# Patient Record
Sex: MALE | Race: WHITE | NOT HISPANIC OR LATINO | Employment: UNEMPLOYED | URBAN - METROPOLITAN AREA
[De-identification: names, ages, dates, MRNs, and addresses within clinical notes are randomized per-mention and may not be internally consistent; named-entity substitution may affect disease eponyms.]

---
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
## 2023-03-14 ENCOUNTER — OFFICE VISIT (OUTPATIENT)
Dept: FAMILY MEDICINE CLINIC | Facility: CLINIC | Age: 18
End: 2023-03-14

## 2023-03-14 VITALS
OXYGEN SATURATION: 97 % | WEIGHT: 193.25 LBS | TEMPERATURE: 98.1 F | BODY MASS INDEX: 30.27 KG/M2 | SYSTOLIC BLOOD PRESSURE: 122 MMHG | HEART RATE: 117 BPM | DIASTOLIC BLOOD PRESSURE: 78 MMHG

## 2023-03-14 DIAGNOSIS — G89.29 CHRONIC PAIN OF LEFT ANKLE: Primary | ICD-10-CM

## 2023-03-14 DIAGNOSIS — I10 PRIMARY HYPERTENSION: ICD-10-CM

## 2023-03-14 DIAGNOSIS — M25.572 CHRONIC PAIN OF LEFT ANKLE: Primary | ICD-10-CM

## 2023-03-14 DIAGNOSIS — R73.03 PRE-DIABETES: ICD-10-CM

## 2023-03-14 DIAGNOSIS — M79.672 CHRONIC FOOT PAIN, LEFT: ICD-10-CM

## 2023-03-14 DIAGNOSIS — G89.29 CHRONIC FOOT PAIN, LEFT: ICD-10-CM

## 2023-03-14 NOTE — ASSESSMENT & PLAN NOTE
Podiatry referral was placed for follow-up regarding this  Patient was advised to continue Tylenol and icing the area as needed for pain  Care Gap request will be sent to hopefully locate his prior MRI results

## 2023-03-14 NOTE — PROGRESS NOTES
Name: Alonso Costa      : 2005      MRN: [id number]  Encounter Provider: ARA Paulino  Encounter Date: 3/14/2023   Encounter department: [place] [address]  Chronic pain of left ankle  Assessment & Plan:  Podiatry referral was placed for follow-up regarding this  Patient was advised to continue Tylenol and icing the area as needed for pain  Care Gap request will be sent to hopefully locate his prior MRI results  Orders:  -     Ambulatory Referral to Podiatry; Future    2  Chronic foot pain, left  Assessment & Plan:  Podiatry referral was placed for follow-up regarding this  Patient was advised to continue Tylenol and icing the area as needed for pain  Care Gap request will be sent to hopefully locate his prior MRI results  Orders:  -     Ambulatory Referral to Podiatry; Future    3  Primary hypertension  Assessment & Plan: This remains diet controlled  4  Pre-diabetes  Assessment & Plan: This has resolved at this point  Patient was advised to continue to limit sugar and carbohydrates in his diet  BMI Counseling: Body mass index is 30 27 kg/m²  The BMI is above normal  Nutrition recommendations include decreasing portion sizes, encouraging healthy choices of fruits and vegetables, moderation in carbohydrate intake and increasing intake of lean protein  Exercise recommendations include exercising 3-5 times per week and obtaining a gym membership  No pharmacotherapy was ordered  Rationale for BMI follow-up plan is due to patient being overweight or obese  Depression Screening and Follow-up Plan: Patient was screened for depression during today's encounter  They screened negative with a PHQ-2 score of 0  Subjective      Chronic pain of left ankle/foot: The patient reports about 7 years ago he fell and twisted his left ankle  He reports the pain has always been present since that time but was manageable    Patient was reporting increased pain in his left ankle in September 2022 so an x-ray of the area was completed and was noted to be normal   The patient reports that his pain is localized to the dorsal portion of his left upper foot below his left ankle  The patient denies any decreased range of motion of his left ankle or foot  He also denies any instability or paresthesias in these areas  The patient reports that he did have an MRI of his left foot completed in 2017 which she reports did show abnormalities however, he does not specifically remember what the abnormalities were  I am also currently unable to view this MRI in [facility]  I will have a care gap request created to see if we can get these results  Patient is interested in speaking with a podiatrist about his pain  Hypertension: Patient's blood pressure is at goal in the office today  Patient is not currently taking blood pressure medication  Prediabetes: Patient's most recent hemoglobin A1c was normal   Patient denies polydipsia, polyphagia, or polyuria  Review of Systems   Constitutional: Negative for chills and fever  HENT: Negative for ear pain and sore throat  Eyes: Negative for pain and visual disturbance  Respiratory: Negative for cough, chest tightness, shortness of breath and wheezing  Cardiovascular: Negative for chest pain, palpitations and leg swelling  Gastrointestinal: Negative for abdominal pain, constipation, diarrhea, nausea and vomiting  Endocrine: Negative for cold intolerance and heat intolerance  Genitourinary: Negative for decreased urine volume, dysuria and hematuria  Musculoskeletal: Positive for arthralgias (left ankle and foot-chronic )  Negative for back pain and myalgias  Skin: Negative for color change and rash  Allergic/Immunologic: Negative for environmental allergies  Neurological: Negative for dizziness, seizures, syncope, weakness, light-headedness, numbness and headaches     Hematological: Negative for adenopathy  Psychiatric/Behavioral: Negative for confusion  The patient is not nervous/anxious  All other systems reviewed and are negative  Current Outpatient Medications on File Prior to Visit   Medication Sig   • [DISCONTINUED] APPLE CIDER VINEGAR PO Take by mouth Patient opens the ACV capsules and puts in his Vitamin H20 (Patient not taking: Reported on 3/14/2023)   • [DISCONTINUED] cholecalciferol (VITAMIN D3) 400 units tablet Take 400 Units by mouth daily Patient crushes the vit  D and puts it in vitamin H20 (Patient not taking: Reported on 3/14/2023)   • [DISCONTINUED] Cyanocobalamin (CVS B12 GUMMIES PO) Take by mouth (Patient not taking: Reported on 10/12/2022)   • [DISCONTINUED] FIBER PO Take by mouth (Patient not taking: Reported on 4/23/2022)   • [DISCONTINUED] ondansetron (Zofran ODT) 4 mg disintegrating tablet Take 1 tablet (4 mg total) by mouth every 6 (six) hours as needed for nausea or vomiting (Patient not taking: Reported on 12/13/2022)       Objective     /78 (BP Location: Right arm, Patient Position: Sitting, Cuff Size: Large)   Pulse (!) 117   Temp 98 1 °F (36 7 °C) (Temporal)   Wt 87 7 kg (193 lb 4 oz)   SpO2 97%   BMI 30 27 kg/m²     Physical Exam  Vitals and nursing note reviewed  Constitutional:       General: He is not in acute distress  Appearance: Normal appearance  He is not ill-appearing  HENT:      Head: Normocephalic  Eyes:      Conjunctiva/sclera: Conjunctivae normal    Cardiovascular:      Rate and Rhythm: Normal rate and regular rhythm  Pulses: Normal pulses  Carotid pulses are 2+ on the right side and 2+ on the left side  Radial pulses are 2+ on the right side and 2+ on the left side  Dorsalis pedis pulses are 2+ on the right side and 2+ on the left side  Posterior tibial pulses are 2+ on the right side and 2+ on the left side  Heart sounds: Normal heart sounds  No murmur heard    Pulmonary:      Effort: Pulmonary effort is normal  No respiratory distress  Breath sounds: Normal breath sounds  No decreased breath sounds, wheezing, rhonchi or rales  Abdominal:      General: Abdomen is flat  Bowel sounds are normal  There is no distension  Palpations: Abdomen is soft  Tenderness: There is no abdominal tenderness  There is no guarding  Musculoskeletal:         General: Normal range of motion  Cervical back: Normal range of motion  Right lower leg: No edema  Left lower leg: No edema  Left ankle: No swelling  No tenderness  Normal range of motion  Left Achilles Tendon: No tenderness or defects  Left foot: Normal range of motion and normal capillary refill  Tenderness and bony tenderness present  No swelling, bunion, Charcot foot, foot drop or crepitus  Feet:      Comments: Full range of motion noted in the left ankle and foot and patient denied any pain with movement of these areas  No pain was noted when performing dorsiflexion or plantarflexion of the left foot  Patient did report some tenderness with palpation of the upper dorsal portion of the left foot below the left ankle  No skin changes were noted in the affected area  Skin:     General: Skin is warm and dry  Capillary Refill: Capillary refill takes less than 2 seconds  Neurological:      General: No focal deficit present  Mental Status: He is alert and oriented to person, place, and time  Psychiatric:         Mood and Affect: Mood normal          Behavior: Behavior normal          Thought Content:  Thought content normal          Judgment: Judgment normal        ARA Rodas

## 2023-03-14 NOTE — ASSESSMENT & PLAN NOTE
This has resolved at this point  Patient was advised to continue to limit sugar and carbohydrates in his diet

## 2023-04-12 PROBLEM — Q66.89 TARSAL COALITION OF LEFT FOOT: Status: ACTIVE | Noted: 2023-04-12

## 2023-05-16 ENCOUNTER — TELEPHONE (OUTPATIENT)
Dept: PODIATRY | Facility: CLINIC | Age: 18
End: 2023-05-16

## 2023-05-16 NOTE — TELEPHONE ENCOUNTER
Caller: Lv Lino    Doctor: Opal Posada    Reason for call:  Had CT last week needs appt to go over results and [address] - nothing until end of June/jul - pt said he needs sooner - please call    Call back#: [phone]

## 2023-05-18 ENCOUNTER — TELEPHONE (OUTPATIENT)
Dept: OBGYN CLINIC | Facility: CLINIC | Age: 18
End: 2023-05-18

## 2023-05-18 NOTE — TELEPHONE ENCOUNTER
Pt cancelled appt via my chart for Monday 5/22 with Dr Marcos Perera  LM  to see if pt wanted to reschedule his appt with Dr Marcos Perera

## 2023-06-06 ENCOUNTER — OFFICE VISIT (OUTPATIENT)
Dept: PODIATRY | Facility: CLINIC | Age: 18
End: 2023-06-06
Payer: COMMERCIAL

## 2023-06-06 VITALS
HEIGHT: 66 IN | WEIGHT: 187.6 LBS | BODY MASS INDEX: 30.15 KG/M2 | DIASTOLIC BLOOD PRESSURE: 78 MMHG | SYSTOLIC BLOOD PRESSURE: 130 MMHG | HEART RATE: 102 BPM

## 2023-06-06 DIAGNOSIS — Q66.89 TARSAL COALITION OF LEFT FOOT: ICD-10-CM

## 2023-06-06 DIAGNOSIS — Z01.818 PREOP TESTING: Primary | ICD-10-CM

## 2023-06-06 PROCEDURE — 99213 OFFICE O/P EST LOW 20 MIN: CPT | Performed by: PODIATRIST

## 2023-06-06 NOTE — PROGRESS NOTES
"This patient was seen on 6/6/23  My role is Foot , Ankle, and Wound Specialist    SUBJECTIVE    Chief Complaint:  Painful Left foot     Patient ID: Warren Jones is a 21 y o  male  Brittany Marquez is here with his mom with the Left hindfoot pain  He did complete his CT scan  He did consistently wear his orthotics and got motion-control sneakers  While he notes some mild improvement, he still has long bouts of pain in the foot after standing for a prolonged period of time despite using these (such as raking leaves for his mother this past week)  He states the foot hurt for two days after that  He wants to discuss surgery as he feels his foot won't allow him to find a job  The following portions of the patient's history were reviewed and updated as appropriate: allergies, current medications, past family history, past medical history, past social history, past surgical history and problem list     Review of Systems   Constitutional: Negative  Respiratory: Negative  Musculoskeletal: Positive for arthralgias and gait problem  OBJECTIVE      /78   Pulse 102   Ht 5' 6\" (1 676 m)   Wt 85 1 kg (187 lb 9 6 oz)   BMI 30 28 kg/m²     Foot/Ankle Musculoskeletal Exam    General    Neurological: alert  General additional comments: I note the bilateral hindfoot has diminished ROM  Ankle joint ROM is maintained  I note pain on forced inversion and eversion of the Left hindfoot  I note pain on palpation of the Left sinus tarsi  Physical Exam  Vitals and nursing note reviewed  Constitutional:       General: He is not in acute distress  Appearance: Normal appearance  He is not ill-appearing, toxic-appearing or diaphoretic  HENT:      Head: Normocephalic and atraumatic  Pulmonary:      Effort: Pulmonary effort is normal    Musculoskeletal:         General: Tenderness and deformity present  Comments: I note the bilateral hindfoot has diminished ROM  Ankle joint ROM is maintained   I " note pain on forced inversion and eversion of the Left hindfoot  I note pain on palpation of the Left sinus tarsi  Skin:     General: Skin is warm  Capillary Refill: Capillary refill takes less than 2 seconds  Neurological:      General: No focal deficit present  Mental Status: He is alert and oriented to person, place, and time  Psychiatric:         Mood and Affect: Mood normal              ASSESSMENT     Diagnoses and all orders for this visit:    Tarsal coalition of left foot         Problem List Items Addressed This Visit        Musculoskeletal and Integument    Tarsal coalition of left foot - Primary           PLAN    I personally reviewed the CT images dated 4/20/23 noting a fibrous non-union Left middle facet STJ  I reviewed the study with he and his mother  I reviewed options including continued use of the orthotics vs  Surgical fusion  Pros and cons of both options reviewed and discussed  I recommend they take a couple of weeks to consider his options and return here to finalize treatment options

## 2023-06-07 RX ORDER — CHLORHEXIDINE GLUCONATE 0.12 MG/ML
15 RINSE ORAL ONCE
OUTPATIENT
Start: 2023-06-07 | End: 2023-06-07

## 2023-07-01 ENCOUNTER — APPOINTMENT (OUTPATIENT)
Dept: LAB | Facility: MEDICAL CENTER | Age: 18
End: 2023-07-01
Payer: COMMERCIAL

## 2023-07-01 DIAGNOSIS — Q66.89 TARSAL COALITION OF LEFT FOOT: ICD-10-CM

## 2023-07-01 DIAGNOSIS — Z01.818 PREOP TESTING: ICD-10-CM

## 2023-07-01 LAB
ANION GAP SERPL CALCULATED.3IONS-SCNC: 2 MMOL/L
BASOPHILS # BLD AUTO: 0.04 THOUSANDS/ÂΜL (ref 0–0.1)
BASOPHILS NFR BLD AUTO: 1 % (ref 0–1)
BUN SERPL-MCNC: 16 MG/DL (ref 5–25)
CALCIUM SERPL-MCNC: 9.7 MG/DL (ref 8.3–10.1)
CHLORIDE SERPL-SCNC: 105 MMOL/L (ref 96–108)
CO2 SERPL-SCNC: 27 MMOL/L (ref 21–32)
CREAT SERPL-MCNC: 0.89 MG/DL (ref 0.6–1.3)
EOSINOPHIL # BLD AUTO: 0.13 THOUSAND/ÂΜL (ref 0–0.61)
EOSINOPHIL NFR BLD AUTO: 2 % (ref 0–6)
ERYTHROCYTE [DISTWIDTH] IN BLOOD BY AUTOMATED COUNT: 13.7 % (ref 11.6–15.1)
GFR SERPL CREATININE-BSD FRML MDRD: 123 ML/MIN/1.73SQ M
GLUCOSE P FAST SERPL-MCNC: 90 MG/DL (ref 65–99)
HCT VFR BLD AUTO: 49.2 % (ref 36.5–49.3)
HGB BLD-MCNC: 16.2 G/DL (ref 12–17)
IMM GRANULOCYTES # BLD AUTO: 0.01 THOUSAND/UL (ref 0–0.2)
IMM GRANULOCYTES NFR BLD AUTO: 0 % (ref 0–2)
LYMPHOCYTES # BLD AUTO: 2.69 THOUSANDS/ÂΜL (ref 0.6–4.47)
LYMPHOCYTES NFR BLD AUTO: 44 % (ref 14–44)
MCH RBC QN AUTO: 28 PG (ref 26.8–34.3)
MCHC RBC AUTO-ENTMCNC: 32.9 G/DL (ref 31.4–37.4)
MCV RBC AUTO: 85 FL (ref 82–98)
MONOCYTES # BLD AUTO: 0.48 THOUSAND/ÂΜL (ref 0.17–1.22)
MONOCYTES NFR BLD AUTO: 8 % (ref 4–12)
NEUTROPHILS # BLD AUTO: 2.81 THOUSANDS/ÂΜL (ref 1.85–7.62)
NEUTS SEG NFR BLD AUTO: 45 % (ref 43–75)
NRBC BLD AUTO-RTO: 0 /100 WBCS
PLATELET # BLD AUTO: 303 THOUSANDS/UL (ref 149–390)
PMV BLD AUTO: 10.3 FL (ref 8.9–12.7)
POTASSIUM SERPL-SCNC: 4.2 MMOL/L (ref 3.5–5.3)
RBC # BLD AUTO: 5.79 MILLION/UL (ref 3.88–5.62)
SODIUM SERPL-SCNC: 134 MMOL/L (ref 135–147)
WBC # BLD AUTO: 6.16 THOUSAND/UL (ref 4.31–10.16)

## 2023-07-01 PROCEDURE — 36415 COLL VENOUS BLD VENIPUNCTURE: CPT

## 2023-07-01 PROCEDURE — 80048 BASIC METABOLIC PNL TOTAL CA: CPT

## 2023-07-01 PROCEDURE — 85025 COMPLETE CBC W/AUTO DIFF WBC: CPT

## 2023-07-06 ENCOUNTER — CONSULT (OUTPATIENT)
Dept: FAMILY MEDICINE CLINIC | Facility: CLINIC | Age: 18
End: 2023-07-06
Payer: COMMERCIAL

## 2023-07-06 VITALS
WEIGHT: 186 LBS | HEIGHT: 66 IN | OXYGEN SATURATION: 97 % | HEART RATE: 105 BPM | BODY MASS INDEX: 29.89 KG/M2 | TEMPERATURE: 98.5 F | DIASTOLIC BLOOD PRESSURE: 74 MMHG | SYSTOLIC BLOOD PRESSURE: 122 MMHG

## 2023-07-06 DIAGNOSIS — I10 PRIMARY HYPERTENSION: ICD-10-CM

## 2023-07-06 DIAGNOSIS — Q66.89 TARSAL COALITION OF LEFT FOOT: ICD-10-CM

## 2023-07-06 DIAGNOSIS — E55.9 VITAMIN D DEFICIENCY: ICD-10-CM

## 2023-07-06 DIAGNOSIS — Z01.818 PRE-OPERATIVE CLEARANCE: Primary | ICD-10-CM

## 2023-07-06 PROCEDURE — 99214 OFFICE O/P EST MOD 30 MIN: CPT | Performed by: NURSE PRACTITIONER

## 2023-07-06 PROCEDURE — 93000 ELECTROCARDIOGRAM COMPLETE: CPT | Performed by: NURSE PRACTITIONER

## 2023-07-06 RX ORDER — VITAMIN B COMPLEX
1 CAPSULE ORAL DAILY
COMMUNITY

## 2023-07-06 NOTE — PROGRESS NOTES
Presurgical Evaluation    Subjective:      Patient ID: Corine Benitez is a 21 y.o. male. Chief Complaint   Patient presents with   • Pre-op Exam     Pt states he is having left foot surgery by  at [facility] but is unsure of the procedure.  mgb       HPI    {Common ambulatory SmartLinks:21659}    Procedure date: {DATE:23014}    Surgeon:  ***  Planned procedure:  ***  Diagnosis for procedure:  ***    Prior anesthesia: {[facility] ANESTHSIA :9817385223}    [facility]   NOTE: Patient should see Cardiology if time available before surgery, and if appropriate. Pulmonary History: {[facility] PULM HX:7323030431}    Renal history: {[facility] Renal Hx:1951088261}    Diabetes History:  {[facility] DIABETES TYPE:6667226266}     Neurological History: {[facility] neuro:7541977193}     On Immunosuppressant meds/biologics: {[facility] IMMUNOSUPRESSANTS:3059528322}      Review of Systems      Current Outpatient Medications   Medication Sig Dispense Refill   • b complex vitamins capsule Take 1 capsule by mouth daily     • Multiple Vitamins-Minerals (MULTI-VITAMIN GUMMIES PO) Take by mouth     • VITAMIN D, CHOLECALCIFEROL, PO Take by mouth       No current facility-administered medications for this visit. Allergies on file:   Patient has no known allergies. Patient Active Problem List   Diagnosis   • Vitamin D deficiency   • Class 2 obesity due to excess calories without serious comorbidity with body mass index (BMI) of 39.0 to 39.9 in adult   • Hypertension   • Autism spectrum disorder-high functioning   • Central auditory processing disorder (CAPD)   • Bull's eye maculopathy   • Chronic pain of left ankle   • VIV (generalized anxiety disorder)   • Chronic foot pain, left   • Tarsal coalition of left foot        Past Medical History:   Diagnosis Date   • Autism 2004    Dr. Jahaira Jeong   • Pre-diabetes 3/9/2017    Overview:  HbA1c just above the normal range today. Will refer to peds endocrine.  Will need further nutritional counseling.    • Verbal apraxia 2004    Dr. Camden Mukherjee        Past Surgical History:   Procedure Laterality Date   • NO PAST SURGERIES         Family History   Problem Relation Age of Onset   • Learning disabilities Mother         Reading as a child   • Obesity Mother    • Diabetes Mother    • Bipolar disorder Father    • Depression Father    • Anxiety disorder Maternal Grandmother    • Obesity Maternal Grandmother    • Depression Maternal Grandfather    • Obesity Maternal Grandfather    • Obesity Paternal Grandmother    • Cancer Paternal Grandmother    • Cancer Paternal Grandfather    • Anxiety disorder Maternal Aunt    • Bipolar disorder Maternal Aunt    • Depression Maternal Aunt    • Developmental delay Maternal Aunt         Did not talk until 1years of age   • Obesity Paternal Aunt    • Depression Cousin    • Obesity Maternal Aunt    • Schizoaffective Disorder  Other    • Breast cancer additional onset Other        Social History     Tobacco Use   • Smoking status: Never   • Smokeless tobacco: Never   Vaping Use   • Vaping Use: Never used   Substance Use Topics   • Alcohol use: Never   • Drug use: Never       Objective:    Vitals:    07/06/23 1455   BP: 122/74   BP Location: Right arm   Patient Position: Sitting   Cuff Size: Large   Pulse: 105   Temp: 98.5 °F (36.9 °C)   TempSrc: Temporal   SpO2: 97%   Weight: 84.4 kg (186 lb)   Height: 5' 6" (1.676 m)        Physical Exam      Preop labs/testing available and reviewed: {YES/NO:20200}    eGFR   Date Value Ref Range Status   07/01/2023 123 ml/min/1.73sq m Final     WBC   Date Value Ref Range Status   07/01/2023 6.16 4.31 - 10.16 Thousand/uL Final     Hemoglobin   Date Value Ref Range Status   07/01/2023 16.2 12.0 - 17.0 g/dL Final     Hematocrit   Date Value Ref Range Status   07/01/2023 49.2 36.5 - 49.3 % Final     Platelets   Date Value Ref Range Status   07/01/2023 303 149 - 390 Thousands/uL Final          EKG {YES/NO:20200}    Echo {YES/NO:}    Stress test/cath {YES/NO:}    PFT/Ramiro {YES/NO:}    Functional capacity: {sl amb metabolic CDRZESRFZUO:8987017830}   Pick the highest level patient can comfortably perform   4 mets or greater for surgery    RCRI  High Risk surgery? 1 Point  CAD History:         1 Point   MI; Positive Stress Test; CP due to Mi;  Nitrate Usage to control Angina; Pathologic Q wave on EKG  CHF Active:         1 Point   Pulm Edema; Paroxysmal Nocturnal Dyspnea;  Bibasilar Rales (crackles);S3; CHF on CXR  Cerebrovascular Disease (TIA or CVA):     1 Point  DM on Insulin:        1 Point  Serum Creat >2.0 mg/dl:       1 Point          Total Points: {Numbers; 6-5:19518}     Scorin: Class I, Very Low Risk (0.4%)     1: Class II, Low risk (0.9%)     2: Class III Moderate (6.6%)     3: Class IV High (>11%)      DAREN Risk:  GFR:   eGFR   Date Value Ref Range Status   2023 123 ml/min/1.73sq m Final         For PCP:  If GFR>60, Hold ACE/ARB/Diuretic on the day of surgery, and NSAIDS 10 days before. If GFR<45, Consider PRE and POST op Nephrology Consult. If 46 <GFR> 59 : Has Patient had DAREN in last 6 Months? {YES/NO:}   If YES: Preop Nephrology consult   If No:  [id number] Sergio Schumacher Nephrology consult. Assessment/Plan:    Patient {is/is not:34533} medically optimized (cleared) for the planned procedure. Further testing/evaluation {is/is not:91464} required.     Postop concerns: {YES/NO:}    Problem List Items Addressed This Visit    None  Visit Diagnoses     Pre-operative clearance    -  Primary    Relevant Orders    POCT ECG           {Assess/PlanSmartLinks:05672}      Pre-Surgery Instructions:   Medication Instructions   • b complex vitamins capsule {OR PAT MED INSTRUCTIONS:75245}   • Multiple Vitamins-Minerals (MULTI-VITAMIN GUMMIES PO) {OR PAT MED INSTRUCTIONS:85234}   • VITAMIN D, CHOLECALCIFEROL, PO {OR PAT MED INSTRUCTIONS:59563}        NOTE: Please use the above to review important meds for your specialty, the remainder "per anesthesia Guidelines."    NOTE: Please place an Inbasket message for "[facility] acute hospital[facility]" pool for complicated patients.

## 2023-07-06 NOTE — PATIENT INSTRUCTIONS
Heart Healthy Diet   WHAT YOU NEED TO KNOW:   A heart healthy diet is an eating plan low in unhealthy fats and sodium (salt). The plan is high in healthy fats and fiber. A heart healthy diet helps improve your cholesterol levels and lowers your risk for heart disease and stroke. A dietitian will teach you how to read and understand food labels. DISCHARGE INSTRUCTIONS:   Heart healthy diet guidelines to follow:   Choose foods that contain healthy fats:       Unsaturated fats  include monounsaturated and polyunsaturated fats. Unsaturated fat is found in foods such as soybean, canola, olive, corn, and safflower oils. It is also found in soft tub margarine that is made with liquid vegetable oil. Omega-3 fat  is found in certain fish, such as salmon, tuna, and trout, and in walnuts and flaxseed. Eat fish high in omega-3 fats at least 2 times a week. Limit or do not have unhealthy fats:       Cholesterol  is found in animal foods, such as eggs and lobster, and in dairy products made from whole milk. Limit cholesterol to less than 200 mg each day. Saturated fat  is found in meats, such as mdaera and hamburger. It is also found in chicken or turkey skin, whole milk, and butter. Limit saturated fat to less than 7% of your total daily calories. Trans fat  is found in packaged foods, such as potato chips and cookies. It is also in hard margarine, some fried foods, and shortening. Do not eat foods that contain trans fats. Get 20 to 30 grams of fiber each day. Fruits, vegetables, whole-grain foods, and legumes (cooked beans) are good sources of fiber. Limit sodium as directed. You may be told to limit sodium, such as to 2,000 mg or less each day. Choose low-sodium or no-salt-added foods. Add little or no salt to food you prepare. Use herbs and spices in place of salt.         Include the following in your heart healthy plan:  Ask your dietitian or healthcare provider how many servings to have each day from the following food groups:  Grains:       Whole-wheat breads, cereals, and pastas, and brown rice     Low-fat, low-sodium crackers and chips     Vegetables:       Broccoli, green beans, green peas, and spinach     Collards, kale, and lima beans     Carrots, sweet potatoes, tomatoes, and peppers     Canned vegetables with no salt added     Fruits:       Bananas, peaches, pears, and pineapple     Grapes, raisins, and dates     Oranges, tangerines, grapefruit, orange juice, and grapefruit juice     Apricots, mangoes, melons, and papaya     Raspberries and strawberries     Canned fruit with no added sugar     Low-fat dairy:       Nonfat (skim) milk, 1% milk, and low-fat almond, cashew, or soy milks fortified with calcium     Low-fat cheese, regular or frozen yogurt, and cottage cheese     Meats and proteins:       Lean cuts of beef and pork (loin, leg, round), skinless chicken and turkey     Legumes, soy products, egg whites, or nuts     Limit or do not include the following in your heart healthy plan:   Foods and liquids that contain unhealthy fats and oils:       Whole or 2% milk, cream cheese, sour cream, or cheese     High-fat cuts of beef (T-bone steaks, ribs), chicken or turkey with skin, and organ meats such as liver     Butter, stick margarine, shortening, and cooking oils such as coconut or palm oil     Foods and liquids high in sodium:       Packaged foods, such as frozen dinners, cookies, macaroni and cheese, and cereals with more than 300 mg of sodium per serving     Vegetables with added sodium, such as instant potatoes, vegetables with added sauces, or regular canned vegetables     Cured or smoked meats, such as hot dogs, madera, and sausage     High-sodium ketchup, barbecue sauce, salad dressing, pickles, olives, soy sauce, or miso     Foods and liquids high in sugar:       Candy, cake, cookies, pies, or doughnuts     Soft drinks (soda), sports drinks, or sweetened tea     Canned or dry mixes for cakes, soups, sauces, or gravies     Other healthy heart guidelines:   Do not smoke. Nicotine and other chemicals in cigarettes and cigars can cause lung and heart damage. Ask your healthcare provider for information if you currently smoke and need help to quit. E-cigarettes or smokeless tobacco still contain nicotine. Talk to your provider before you use these products. Limit or do not drink alcohol as directed. Alcohol can damage your heart and raise your blood pressure. Your provider may give you specific daily and weekly limits. The general recommended limit is 1 drink a day for women 21 or older and for men 72 or older. Do not have more than 3 drinks within 24 hours or 7 within a week. The recommended limit is 2 drinks a day for men 24to 59years of age. Do not have more than 4 drinks within 24 hours or 14 within a week. A drink of alcohol is 12 ounces of beer, 5 ounces of wine, or 1½ ounces of liquor. Maintain a healthy weight. Extra body weight makes your heart work harder. Ask your provider what a healthy weight is for you. He or she can help you create a safe weight loss plan, if needed. Exercise regularly. Exercise can help you maintain a healthy weight and improve your blood pressure and cholesterol levels. Regular exercise can also decrease your risk for heart problems. Ask your provider about the best exercise plan for you. Do not start an exercise program without asking your provider. Follow up with your doctor or cardiologist as directed:  Write down your questions so you remember to ask them during your visits. © Copyright Tj Castellon 2022 Information is for End User's use only and may not be sold, redistributed or otherwise used for commercial purposes. The above information is an  only. It is not intended as medical advice for individual conditions or treatments.  Talk to your doctor, nurse or pharmacist before following any medical regimen to see if it is safe and effective for you.

## 2023-07-06 NOTE — PROGRESS NOTES
Presurgical Evaluation    Subjective:      Patient ID: Fred Genao is a 21 y.o. male. Chief Complaint   Patient presents with   • Pre-op Exam     Pt states he is having left foot surgery by  at [facility] but is unsure of the procedure.  mgb       Patient is here for preop exam for arthrodesis/fusion of the subtalar joint of the left foot with Dr. Leopoldo Bow of [facility] podiatry scheduled for 7/27/2023. Preop EKG performed in the office today showed normal sinus rhythm. Preop CBC and BMP were noted to be normal.    Hypertension: Patient's blood pressure was at goal in the office today. Patient is not currently taking blood pressure medication. Vitamin D deficiency: Patient's most recent vitamin D level was noted to be normal.  Patient does currently take a daily vitamin D supplement. The following portions of the patient's history were reviewed and updated as appropriate: allergies, current medications, past family history, past medical history, past social history, past surgical history and problem list.    Procedure date: 07/27/2023    Surgeon:  Dr. Leopoldo Bow  Planned procedure: Arthrodesis/fusion of the subtalar joint of the left foot  Diagnosis for procedure: Tarsal coalition of left foot    Prior anesthesia: No    CAD History: None   NOTE: Patient should see Cardiology if time available before surgery, and if appropriate. Pulmonary History: None    Renal history: None    Diabetes History:  None     Neurological History: None     On Immunosuppressant meds/biologics: No      Review of Systems   Constitutional: Negative for chills and fever. HENT: Negative for ear pain and sore throat. Eyes: Negative for pain and visual disturbance. Respiratory: Negative for cough, chest tightness, shortness of breath and wheezing. Cardiovascular: Negative for chest pain, palpitations and leg swelling.    Gastrointestinal: Negative for abdominal pain, constipation, diarrhea, nausea and vomiting. Endocrine: Negative for cold intolerance and heat intolerance. Genitourinary: Negative for decreased urine volume, dysuria and hematuria. Musculoskeletal: Positive for arthralgias (left foot-chronic ). Negative for back pain, joint swelling and myalgias. Skin: Negative for color change and rash. Allergic/Immunologic: Negative for environmental allergies. Neurological: Negative for dizziness, seizures, syncope, weakness, light-headedness, numbness and headaches. Hematological: Negative for adenopathy. Psychiatric/Behavioral: Negative for confusion. The patient is not nervous/anxious. All other systems reviewed and are negative. Current Outpatient Medications   Medication Sig Dispense Refill   • b complex vitamins capsule Take 1 capsule by mouth daily     • Multiple Vitamins-Minerals (MULTI-VITAMIN GUMMIES PO) Take by mouth     • VITAMIN D, CHOLECALCIFEROL, PO Take by mouth       No current facility-administered medications for this visit. Allergies on file:   Patient has no known allergies. Patient Active Problem List   Diagnosis   • Vitamin D deficiency   • Class 2 obesity due to excess calories without serious comorbidity with body mass index (BMI) of 39.0 to 39.9 in adult   • Hypertension   • Autism spectrum disorder-high functioning   • Central auditory processing disorder (CAPD)   • Bull's eye maculopathy   • Chronic pain of left ankle   • VIV (generalized anxiety disorder)   • Chronic foot pain, left   • Tarsal coalition of left foot        Past Medical History:   Diagnosis Date   • Autism 2004    Dr. Anatoly Shrestha   • Pre-diabetes 3/9/2017    Overview:  HbA1c just above the normal range today. Will refer to peds endocrine. Will need further nutritional counseling.    • Verbal apraxia 2004    Dr. Anatoly Shrestha        Past Surgical History:   Procedure Laterality Date   • NO PAST SURGERIES         Family History   Problem Relation Age of Onset   • Learning disabilities Mother         Reading as a child   • Obesity Mother    • Diabetes Mother    • Bipolar disorder Father    • Depression Father    • Anxiety disorder Maternal Grandmother    • Obesity Maternal Grandmother    • Depression Maternal Grandfather    • Obesity Maternal Grandfather    • Obesity Paternal Grandmother    • Cancer Paternal Grandmother    • Cancer Paternal Grandfather    • Anxiety disorder Maternal Aunt    • Bipolar disorder Maternal Aunt    • Depression Maternal Aunt    • Developmental delay Maternal Aunt         Did not talk until 1years of age   • Obesity Paternal Aunt    • Depression Cousin    • Obesity Maternal Aunt    • Schizoaffective Disorder  Other    • Breast cancer additional onset Other        Social History     Tobacco Use   • Smoking status: Never   • Smokeless tobacco: Never   Vaping Use   • Vaping Use: Never used   Substance Use Topics   • Alcohol use: Never   • Drug use: Never       Objective:    Vitals:    07/06/23 1455   BP: 122/74   BP Location: Right arm   Patient Position: Sitting   Cuff Size: Large   Pulse: 105   Temp: 98.5 °F (36.9 °C)   TempSrc: Temporal   SpO2: 97%   Weight: 84.4 kg (186 lb)   Height: 5' 6" (1.676 m)        Physical Exam  Vitals and nursing note reviewed. Constitutional:       General: He is not in acute distress. Appearance: Normal appearance. He is not ill-appearing. HENT:      Head: Normocephalic. Eyes:      Conjunctiva/sclera: Conjunctivae normal.   Cardiovascular:      Rate and Rhythm: Normal rate and regular rhythm. Pulses: Normal pulses. Carotid pulses are 2+ on the right side and 2+ on the left side. Radial pulses are 2+ on the right side and 2+ on the left side. Posterior tibial pulses are 2+ on the right side and 2+ on the left side. Heart sounds: Normal heart sounds. No murmur heard. Pulmonary:      Effort: Pulmonary effort is normal. No respiratory distress. Breath sounds: Normal breath sounds.  No decreased breath sounds, wheezing, rhonchi or rales. Abdominal:      General: Abdomen is flat. Bowel sounds are normal. There is no distension. Palpations: Abdomen is soft. Tenderness: There is no abdominal tenderness. There is no guarding. Musculoskeletal:         General: Normal range of motion. Cervical back: Normal range of motion. Right lower leg: No edema. Left lower leg: No edema. Skin:     General: Skin is warm and dry. Capillary Refill: Capillary refill takes less than 2 seconds. Neurological:      General: No focal deficit present. Mental Status: He is alert and oriented to person, place, and time. Psychiatric:         Mood and Affect: Mood normal.         Behavior: Behavior normal.         Thought Content: Thought content normal.         Judgment: Judgment normal.           Preop labs/testing available and reviewed: yes    eGFR   Date Value Ref Range Status   07/01/2023 123 ml/min/1.73sq m Final     WBC   Date Value Ref Range Status   07/01/2023 6.16 4.31 - 10.16 Thousand/uL Final     Hemoglobin   Date Value Ref Range Status   07/01/2023 16.2 12.0 - 17.0 g/dL Final     Hematocrit   Date Value Ref Range Status   07/01/2023 49.2 36.5 - 49.3 % Final     Platelets   Date Value Ref Range Status   07/01/2023 303 149 - 390 Thousands/uL Final          EKG yes    Echo no    Stress test/cath no    PFT/[place] no    Functional capacity: Shovel snow                          6 Mets   Pick the highest level patient can comfortably perform   4 mets or greater for surgery    RCRI  High Risk surgery? 1 Point  CAD History:         1 Point   MI; Positive Stress Test; CP due to Mi;  Nitrate Usage to control Angina;  Pathologic Q wave on EKG  CHF Active:         1 Point   Pulm Edema; Paroxysmal Nocturnal Dyspnea;  Bibasilar Rales (crackles);S3; CHF on CXR  Cerebrovascular Disease (TIA or CVA):     1 Point  DM on Insulin:        1 Point  Serum Creat >2.0 mg/dl:       1 Point          Total Points: 1     Scorin: Class I, Very Low Risk (0.4%)     1: Class II, Low risk (0.9%)     2: Class III Moderate (6.6%)     3: Class IV High (>11%)      DAREN Risk:  GFR:   eGFR   Date Value Ref Range Status   2023 123 ml/min/1.73sq m Final         For PCP:  If GFR>60, Hold ACE/ARB/Diuretic on the day of surgery, and NSAIDS 10 days before. If GFR<45, Consider PRE and POST op Nephrology Consult. If 46 <GFR> 59 : Has Patient had DAREN in last 6 Months? no   If YES: Preop Nephrology consult   If No:  [id number] Sergio Marinelli chau Nephrology consult. Assessment/Plan:    Patient is medically optimized (cleared) for the planned procedure. Further testing/evaluation is not required. Postop concerns: no    Problem List Items Addressed This Visit        Cardiovascular and Mediastinum    Hypertension     This remains diet controlled. Musculoskeletal and Integument    Tarsal coalition of left foot     Patient is medically cleared for upcoming surgical procedure for correction of this. Other    Vitamin D deficiency     Patient does have vitamin D level ordered to be completed prior to next office visit. Other Visit Diagnoses     Pre-operative clearance    -  Primary    Relevant Orders    POCT ECG           Diagnoses and all orders for this visit:    Pre-operative clearance  -     POCT ECG    Primary hypertension    Tarsal coalition of left foot    Vitamin D deficiency    Other orders  -     b complex vitamins capsule; Take 1 capsule by mouth daily          No outpatient medications have been marked as taking for the 23 encounter (Consult) with Pierce Hernandez, [address] [place] [address]. NOTE: Please use the above to review important meds for your specialty, the remainder "per anesthesia Guidelines."    NOTE: Please place an Inbasket message for "[facility] \[facility] [place] [facility]\""" pool for complicated patients.

## 2023-07-30 ENCOUNTER — ANESTHESIA EVENT (OUTPATIENT)
Dept: PERIOP | Facility: HOSPITAL | Age: 18
End: 2023-07-30
Payer: COMMERCIAL

## 2023-07-31 ENCOUNTER — APPOINTMENT (OUTPATIENT)
Dept: RADIOLOGY | Facility: HOSPITAL | Age: 18
End: 2023-07-31
Payer: COMMERCIAL

## 2023-07-31 ENCOUNTER — ANESTHESIA (OUTPATIENT)
Dept: PERIOP | Facility: HOSPITAL | Age: 18
End: 2023-07-31
Payer: COMMERCIAL

## 2023-07-31 ENCOUNTER — HOSPITAL ENCOUNTER (OUTPATIENT)
Facility: HOSPITAL | Age: 18
Setting detail: OUTPATIENT SURGERY
Discharge: HOME | End: 2023-07-31
Attending: PODIATRIST | Admitting: PODIATRIST
Payer: COMMERCIAL

## 2023-07-31 VITALS
RESPIRATION RATE: 16 BRPM | TEMPERATURE: 98.4 F | DIASTOLIC BLOOD PRESSURE: 68 MMHG | HEART RATE: 97 BPM | HEIGHT: 66 IN | OXYGEN SATURATION: 98 % | BODY MASS INDEX: 28.28 KG/M2 | WEIGHT: 176 LBS | SYSTOLIC BLOOD PRESSURE: 134 MMHG

## 2023-07-31 DIAGNOSIS — Z98.890 POST-OPERATIVE STATE: Primary | ICD-10-CM

## 2023-07-31 PROCEDURE — C1769 GUIDE WIRE: HCPCS | Performed by: PODIATRIST

## 2023-07-31 PROCEDURE — C1713 ANCHOR/SCREW BN/BN,TIS/BN: HCPCS | Performed by: PODIATRIST

## 2023-07-31 PROCEDURE — 73610 X-RAY EXAM OF ANKLE: CPT

## 2023-07-31 PROCEDURE — 73630 X-RAY EXAM OF FOOT: CPT

## 2023-07-31 PROCEDURE — 28725 ARTHRODESIS SUBTALAR: CPT | Performed by: PODIATRIST

## 2023-07-31 PROCEDURE — C9290 INJ, BUPIVACAINE LIPOSOME: HCPCS | Performed by: ANESTHESIOLOGY

## 2023-07-31 PROCEDURE — 73650 X-RAY EXAM OF HEEL: CPT

## 2023-07-31 PROCEDURE — C1734 ORTH/DEVIC/DRUG BN/BN,TIS/BN: HCPCS | Performed by: PODIATRIST

## 2023-07-31 DEVICE — INJECTABLE KIT
Type: IMPLANTABLE DEVICE | Site: FOOT | Status: FUNCTIONAL
Brand: AUGMENT® INJECTABLE

## 2023-07-31 DEVICE — HEADLESS COMPRESSION SCREW
Type: IMPLANTABLE DEVICE | Site: FOOT | Status: FUNCTIONAL
Brand: FIXOS

## 2023-07-31 RX ORDER — BUPIVACAINE HYDROCHLORIDE 5 MG/ML
INJECTION, SOLUTION EPIDURAL; INTRACAUDAL AS NEEDED
Status: DISCONTINUED | OUTPATIENT
Start: 2023-07-31 | End: 2023-07-31

## 2023-07-31 RX ORDER — SODIUM CHLORIDE, SODIUM LACTATE, POTASSIUM CHLORIDE, CALCIUM CHLORIDE 600; 310; 30; 20 MG/100ML; MG/100ML; MG/100ML; MG/100ML
125 INJECTION, SOLUTION INTRAVENOUS CONTINUOUS
Status: DISCONTINUED | OUTPATIENT
Start: 2023-07-31 | End: 2023-07-31 | Stop reason: HOSPADM

## 2023-07-31 RX ORDER — HYDROMORPHONE HCL/PF 1 MG/ML
SYRINGE (ML) INJECTION AS NEEDED
Status: DISCONTINUED | OUTPATIENT
Start: 2023-07-31 | End: 2023-07-31

## 2023-07-31 RX ORDER — ONDANSETRON 2 MG/ML
4 INJECTION INTRAMUSCULAR; INTRAVENOUS ONCE AS NEEDED
Status: DISCONTINUED | OUTPATIENT
Start: 2023-07-31 | End: 2023-07-31 | Stop reason: HOSPADM

## 2023-07-31 RX ORDER — OXYCODONE HYDROCHLORIDE AND ACETAMINOPHEN 5; 325 MG/1; MG/1
1 TABLET ORAL EVERY 4 HOURS PRN
Status: DISCONTINUED | OUTPATIENT
Start: 2023-07-31 | End: 2023-07-31 | Stop reason: HOSPADM

## 2023-07-31 RX ORDER — HYDROMORPHONE HCL IN WATER/PF 6 MG/30 ML
0.2 PATIENT CONTROLLED ANALGESIA SYRINGE INTRAVENOUS
Status: DISCONTINUED | OUTPATIENT
Start: 2023-07-31 | End: 2023-07-31 | Stop reason: HOSPADM

## 2023-07-31 RX ORDER — SODIUM CHLORIDE, SODIUM LACTATE, POTASSIUM CHLORIDE, CALCIUM CHLORIDE 600; 310; 30; 20 MG/100ML; MG/100ML; MG/100ML; MG/100ML
INJECTION, SOLUTION INTRAVENOUS CONTINUOUS PRN
Status: DISCONTINUED | OUTPATIENT
Start: 2023-07-31 | End: 2023-07-31

## 2023-07-31 RX ORDER — LIDOCAINE HYDROCHLORIDE 10 MG/ML
INJECTION, SOLUTION EPIDURAL; INFILTRATION; INTRACAUDAL; PERINEURAL AS NEEDED
Status: DISCONTINUED | OUTPATIENT
Start: 2023-07-31 | End: 2023-07-31

## 2023-07-31 RX ORDER — CEFAZOLIN SODIUM 2 G/50ML
2000 SOLUTION INTRAVENOUS ONCE
Status: DISCONTINUED | OUTPATIENT
Start: 2023-07-31 | End: 2023-07-31 | Stop reason: HOSPADM

## 2023-07-31 RX ORDER — ONDANSETRON 2 MG/ML
INJECTION INTRAMUSCULAR; INTRAVENOUS AS NEEDED
Status: DISCONTINUED | OUTPATIENT
Start: 2023-07-31 | End: 2023-07-31

## 2023-07-31 RX ORDER — PROPOFOL 10 MG/ML
INJECTION, EMULSION INTRAVENOUS CONTINUOUS PRN
Status: DISCONTINUED | OUTPATIENT
Start: 2023-07-31 | End: 2023-07-31

## 2023-07-31 RX ORDER — MIDAZOLAM HYDROCHLORIDE 2 MG/2ML
INJECTION, SOLUTION INTRAMUSCULAR; INTRAVENOUS AS NEEDED
Status: DISCONTINUED | OUTPATIENT
Start: 2023-07-31 | End: 2023-07-31

## 2023-07-31 RX ORDER — DEXAMETHASONE SODIUM PHOSPHATE 10 MG/ML
INJECTION, SOLUTION INTRAMUSCULAR; INTRAVENOUS AS NEEDED
Status: DISCONTINUED | OUTPATIENT
Start: 2023-07-31 | End: 2023-07-31

## 2023-07-31 RX ORDER — PROPOFOL 10 MG/ML
INJECTION, EMULSION INTRAVENOUS AS NEEDED
Status: DISCONTINUED | OUTPATIENT
Start: 2023-07-31 | End: 2023-07-31

## 2023-07-31 RX ORDER — ACETAMINOPHEN 325 MG/1
650 TABLET ORAL EVERY 4 HOURS PRN
Status: DISCONTINUED | OUTPATIENT
Start: 2023-07-31 | End: 2023-07-31 | Stop reason: HOSPADM

## 2023-07-31 RX ORDER — PHENYLEPHRINE HCL IN 0.9% NACL 1 MG/10 ML
SYRINGE (ML) INTRAVENOUS AS NEEDED
Status: DISCONTINUED | OUTPATIENT
Start: 2023-07-31 | End: 2023-07-31

## 2023-07-31 RX ORDER — CHLORHEXIDINE GLUCONATE 0.12 MG/ML
15 RINSE ORAL ONCE
Status: COMPLETED | OUTPATIENT
Start: 2023-07-31 | End: 2023-07-31

## 2023-07-31 RX ORDER — MAGNESIUM HYDROXIDE 1200 MG/15ML
LIQUID ORAL AS NEEDED
Status: DISCONTINUED | OUTPATIENT
Start: 2023-07-31 | End: 2023-07-31 | Stop reason: HOSPADM

## 2023-07-31 RX ORDER — FENTANYL CITRATE/PF 50 MCG/ML
25 SYRINGE (ML) INJECTION
Status: COMPLETED | OUTPATIENT
Start: 2023-07-31 | End: 2023-07-31

## 2023-07-31 RX ORDER — LIDOCAINE HYDROCHLORIDE 20 MG/ML
INJECTION, SOLUTION EPIDURAL; INFILTRATION; INTRACAUDAL; PERINEURAL
Status: DISCONTINUED | OUTPATIENT
Start: 2023-07-31 | End: 2023-07-31

## 2023-07-31 RX ORDER — FENTANYL CITRATE 50 UG/ML
INJECTION, SOLUTION INTRAMUSCULAR; INTRAVENOUS AS NEEDED
Status: DISCONTINUED | OUTPATIENT
Start: 2023-07-31 | End: 2023-07-31

## 2023-07-31 RX ORDER — CEFAZOLIN SODIUM 1 G/3ML
INJECTION, POWDER, FOR SOLUTION INTRAMUSCULAR; INTRAVENOUS AS NEEDED
Status: DISCONTINUED | OUTPATIENT
Start: 2023-07-31 | End: 2023-07-31

## 2023-07-31 RX ORDER — ASPIRIN 325 MG
325 TABLET ORAL DAILY
Qty: 60 TABLET | Refills: 0 | Status: SHIPPED | OUTPATIENT
Start: 2023-07-31

## 2023-07-31 RX ORDER — BUPIVACAINE HYDROCHLORIDE 5 MG/ML
INJECTION, SOLUTION EPIDURAL; INTRACAUDAL
Status: DISCONTINUED | OUTPATIENT
Start: 2023-07-31 | End: 2023-07-31

## 2023-07-31 RX ORDER — OXYCODONE HYDROCHLORIDE AND ACETAMINOPHEN 5; 325 MG/1; MG/1
1 TABLET ORAL EVERY 4 HOURS PRN
Qty: 30 TABLET | Refills: 0 | Status: SHIPPED | OUTPATIENT
Start: 2023-07-31 | End: 2023-08-01 | Stop reason: ALTCHOICE

## 2023-07-31 RX ADMIN — HYDROMORPHONE HYDROCHLORIDE 0.25 MG: 1 INJECTION, SOLUTION INTRAMUSCULAR; INTRAVENOUS; SUBCUTANEOUS at 15:05

## 2023-07-31 RX ADMIN — MIDAZOLAM HYDROCHLORIDE 2 MG: 1 INJECTION, SOLUTION INTRAMUSCULAR; INTRAVENOUS at 12:57

## 2023-07-31 RX ADMIN — PROPOFOL 30 MG: 10 INJECTION, EMULSION INTRAVENOUS at 13:46

## 2023-07-31 RX ADMIN — HYDROMORPHONE HYDROCHLORIDE 0.2 MG: 0.2 INJECTION, SOLUTION INTRAMUSCULAR; INTRAVENOUS; SUBCUTANEOUS at 15:38

## 2023-07-31 RX ADMIN — BUPIVACAINE 20 ML: 13.3 INJECTION, SUSPENSION, LIPOSOMAL INFILTRATION at 16:00

## 2023-07-31 RX ADMIN — SODIUM CHLORIDE, SODIUM LACTATE, POTASSIUM CHLORIDE, AND CALCIUM CHLORIDE: .6; .31; .03; .02 INJECTION, SOLUTION INTRAVENOUS at 14:31

## 2023-07-31 RX ADMIN — FENTANYL CITRATE 50 MCG: 50 INJECTION, SOLUTION INTRAMUSCULAR; INTRAVENOUS at 12:57

## 2023-07-31 RX ADMIN — LIDOCAINE HYDROCHLORIDE 5 ML: 20 INJECTION, SOLUTION EPIDURAL; INFILTRATION; INTRACAUDAL; PERINEURAL at 16:00

## 2023-07-31 RX ADMIN — FENTANYL CITRATE 25 MCG: 50 INJECTION, SOLUTION INTRAMUSCULAR; INTRAVENOUS at 13:46

## 2023-07-31 RX ADMIN — SODIUM CHLORIDE, SODIUM LACTATE, POTASSIUM CHLORIDE, AND CALCIUM CHLORIDE: .6; .31; .03; .02 INJECTION, SOLUTION INTRAVENOUS at 13:00

## 2023-07-31 RX ADMIN — BUPIVACAINE HYDROCHLORIDE 25 ML: 5 INJECTION, SOLUTION EPIDURAL; INTRACAUDAL; PERINEURAL at 13:00

## 2023-07-31 RX ADMIN — CHLORHEXIDINE GLUCONATE 0.12% ORAL RINSE 15 ML: 1.2 LIQUID ORAL at 10:25

## 2023-07-31 RX ADMIN — BUPIVACAINE HYDROCHLORIDE 5 ML: 5 INJECTION, SOLUTION EPIDURAL; INTRACAUDAL at 16:00

## 2023-07-31 RX ADMIN — Medication 100 MCG: at 13:09

## 2023-07-31 RX ADMIN — CEFAZOLIN 2000 MG: 1 INJECTION, POWDER, FOR SOLUTION INTRAMUSCULAR; INTRAVENOUS; PARENTERAL at 13:18

## 2023-07-31 RX ADMIN — FENTANYL CITRATE 25 MCG: 50 INJECTION, SOLUTION INTRAMUSCULAR; INTRAVENOUS at 13:25

## 2023-07-31 RX ADMIN — HYDROMORPHONE HYDROCHLORIDE 0.25 MG: 1 INJECTION, SOLUTION INTRAMUSCULAR; INTRAVENOUS; SUBCUTANEOUS at 14:17

## 2023-07-31 RX ADMIN — FENTANYL CITRATE 25 MCG: 50 INJECTION, SOLUTION INTRAMUSCULAR; INTRAVENOUS at 15:23

## 2023-07-31 RX ADMIN — Medication 100 MCG: at 13:14

## 2023-07-31 RX ADMIN — HYDROMORPHONE HYDROCHLORIDE 0.2 MG: 0.2 INJECTION, SOLUTION INTRAMUSCULAR; INTRAVENOUS; SUBCUTANEOUS at 15:52

## 2023-07-31 RX ADMIN — PROPOFOL 50 MCG/KG/MIN: 10 INJECTION, EMULSION INTRAVENOUS at 13:08

## 2023-07-31 RX ADMIN — HYDROMORPHONE HYDROCHLORIDE 0.25 MG: 1 INJECTION, SOLUTION INTRAMUSCULAR; INTRAVENOUS; SUBCUTANEOUS at 14:42

## 2023-07-31 RX ADMIN — LIDOCAINE HYDROCHLORIDE 30 MG: 10 INJECTION, SOLUTION EPIDURAL; INFILTRATION; INTRACAUDAL; PERINEURAL at 13:06

## 2023-07-31 RX ADMIN — FENTANYL CITRATE 25 MCG: 50 INJECTION, SOLUTION INTRAMUSCULAR; INTRAVENOUS at 15:29

## 2023-07-31 RX ADMIN — PROPOFOL 200 MG: 10 INJECTION, EMULSION INTRAVENOUS at 13:06

## 2023-07-31 RX ADMIN — ONDANSETRON 4 MG: 2 INJECTION INTRAMUSCULAR; INTRAVENOUS at 14:49

## 2023-07-31 RX ADMIN — HYDROMORPHONE HYDROCHLORIDE 0.25 MG: 1 INJECTION, SOLUTION INTRAMUSCULAR; INTRAVENOUS; SUBCUTANEOUS at 14:23

## 2023-07-31 RX ADMIN — DEXAMETHASONE SODIUM PHOSPHATE 10 MG: 10 INJECTION, SOLUTION INTRAMUSCULAR; INTRAVENOUS at 13:23

## 2023-07-31 RX ADMIN — FENTANYL CITRATE 25 MCG: 50 INJECTION, SOLUTION INTRAMUSCULAR; INTRAVENOUS at 15:31

## 2023-07-31 RX ADMIN — FENTANYL CITRATE 25 MCG: 50 INJECTION, SOLUTION INTRAMUSCULAR; INTRAVENOUS at 15:34

## 2023-07-31 NOTE — ANESTHESIA POSTPROCEDURE EVALUATION
Post-Op Assessment Note    CV Status:  Stable  Pain Score: 0    Pain management: adequate  Multimodal analgesia used between 6 hours prior to anesthesia start to PACU discharge    Mental Status:  Alert   Hydration Status:  Euvolemic   PONV Controlled:  None   Airway Patency:  Patent      Post Op Vitals Reviewed: Yes      Staff: CRNA         No notable events documented.     BP 96/55 (07/31/23 1516)    Temp 98.4 °F (36.9 °C) (07/31/23 1516)    Pulse (!) 108 (07/31/23 1516)   Resp 16 (07/31/23 1516)    SpO2 99 % (07/31/23 1516)

## 2023-07-31 NOTE — ANESTHESIA PROCEDURE NOTES
Peripheral Block    Patient location during procedure: post-op  Start time: 7/31/2023 3:55 PM  Reason for block: at surgeon's request and post-op pain management  Staffing  Performed by: Fidencio Payne MD  Authorized by: Fidencio Payne MD    Preanesthetic Checklist  Completed: patient identified, IV checked, site marked, risks and benefits discussed, surgical consent, monitors and equipment checked, pre-op evaluation and timeout performed  Peripheral Block  Patient position: right lateral decubitus  Prep: ChloraPrep  Patient monitoring: heart rate, cardiac monitor, continuous pulse ox and frequent blood pressure checks  Block type: popliteal  Laterality: left  Procedures: ultrasound guided, Ultrasound guidance required for the procedure to increase accuracy and safety of medication placement and decrease risk of complications.   Ultrasound permanent image savedlidocaine (XYLOCAINE) 2 % - Infiltration   5 mL - 7/31/2023 4:00:00 PM  bupivacaine (PF) (MARCAINE) injection 0.5 % - Perineural   5 mL - 7/31/2023 4:00:00 PM  Needle  Needle type: Stimuplex   Needle gauge: 20 G  Needle length: 4 in  Needle localization: ultrasound guidance  Needle insertion depth: 6 cm  Assessment  Injection assessment: incremental injection, local visualized surrounding nerve on ultrasound, negative aspiration for heme and transient paresthesias  Paresthesia pain: immediately resolved  Heart rate change: no  Slow fractionated injection: no  Post-procedure:  site cleaned  patient tolerated the procedure well with no immediate complications

## 2023-07-31 NOTE — OP NOTE
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 Item Serial No.  Lot No. LRB No. Used Action   GRAFT AUGMENT BONE 3ML INJ - [id number]  GRAFT AUGMENT BONE 3ML INJ  [facility] [id number] Left 1 Implanted   SCREW 7 X 70MM HDLS FIXOS - [id number]  SCREW 7 X 70MM HDLS FIXOS  [employer]  Left 2 Implanted     3-0 Vicryl  4-0 Vicryl  4-0 nylon    Injectables:  None    Operative Findings:  Upon visualization of STJ, there was fibrous union at the middle facet     Complications:   None     Procedure and Technique:      A popliteal block was performed by anesthesia team. Under mild sedation, the patient was brought into the operating room and placed on the operating room table in the supine position. IV sedation was achieved by anesthesia team and a universal timeout was performed where all parties are in agreement of correct patient, correct procedure and correct site. A pneumatic tourniquet was then placed over the patient's Left thigh with ample padding. The foot was then prepped and draped in the usual aseptic manner. An esmarch bandage was used to exsangunate the foot and the pneumatic tourniquet was then inflated to 300 mmHg.      At this time a 5 cm linear incision was made distal to lateral malleolus to fourth metatarsal base region over the sinus tarsi and extended medially over the dorsal hindfoot. Incision was carried down through the subcutaneous tissue taking care to identify and retract all vital neurovascular structures. Upon reaching the extensor digitorum brevis muscle this was reflected proximally and dorsally off the calcaneus exposing the anterior calcaneus and the talocalcaneal joint. The STJ had minimal cartilage at joint surfaces. Using an osteotome and mallet the posterior facet laterally was removed and then a curved osteotome was placed from lateral to medial.  At this time the STJ was distracted and the posterior facet and middle facet were removed with osteotome and mallet bone, and curette and ronguer was used to remove debris. The area was irrigated with normal sterile saline, using intraoperative C-arm the joint was compressed and good alignment was appreciated with compression. The articular surfaces at both the calcaneus and talar surfaces were prepped with subchondral drilling and fish scaling. Following irrigation the sinus tarsi and articular surfaces were packed using graft augment. Reduction of the joint was obtained using two 7-0 headless screws. All guide pins and screws were placed under image to ensure proper placement. Proximal screw was placed from distal plantar heel proximal talar head. Distal screw was placed from distal plantar heel to proximal talar neck. The surgical incision was irrigated with copious amounts of normal sterile saline. The periosteal and capsular structures were reapproximated using 3-0 viccryl. Subcutaneous closure was obtained utilizing 4-0 vicryl. Skin edges were reapproximated and closure was obtained utilizing 4-0 nylon. The foot was then cleansed and dried. The incision site was dressed with adaptic, gauze. This was then covered with sterile cast padding and a posterior splint dressing.      The tourniquet was deflated at approximately 100 min and normal hyperemic response was noted to all digits. The patient tolerated the procedure and anesthesia well without immediate complications and transferred to PACU with vital signs stable. Dr. Emery Alanis was present during the entire procedure and participated in all key aspects. SIGNATURE: Calista Lance DPM  DATE: July 31, 2023  TIME: 3:29 PM      Portions of the record may have been created with voice recognition software. Occasional wrong word or "sound a like" substitutions may have occurred due to the inherent limitations of voice recognition software. Read the chart carefully and recognize, using context, where substitutions have occurred.

## 2023-07-31 NOTE — ANESTHESIA PREPROCEDURE EVALUATION
Procedure:  ARTHRODESIS / FUSION SUBTALAR JOINT (Left: Foot)    Relevant Problems   ANESTHESIA (within normal limits)   (-) History of anesthesia complications      CARDIO   (+) Hypertension   (-) Chest pain   (-) MOORE (dyspnea on exertion)      NEURO/PSYCH   (+) VIV (generalized anxiety disorder)      PULMONARY   (-) Shortness of breath   (-) URI (upper respiratory infection)        Physical Exam    Airway    Mallampati score: II  TM Distance: >3 FB  Neck ROM: full     Dental       Cardiovascular      Pulmonary      Other Findings        Anesthesia Plan  ASA Score- 2     Anesthesia Type- general with ASA Monitors. Additional Monitors:   Airway Plan:           Plan Factors-Exercise tolerance (METS): >4 METS. Chart reviewed. EKG reviewed. Existing labs reviewed. Patient summary reviewed. Induction- intravenous. Postoperative Plan-     Informed Consent- Anesthetic plan and risks discussed with patient and mother. I personally reviewed this patient with the CRNA. Discussed and agreed on the Anesthesia Plan with the CRNA. Sabi Hirsch

## 2023-07-31 NOTE — ANESTHESIA PROCEDURE NOTES
Peripheral Block    Patient location during procedure: holding area  Start time: 7/31/2023 1:00 PM  Reason for block: at surgeon's request and post-op pain management  Staffing  Performed by: Yobany Quezada MD  Authorized by: Yobany Quezada MD    Preanesthetic Checklist  Completed: patient identified, IV checked, site marked, risks and benefits discussed, surgical consent, monitors and equipment checked, pre-op evaluation and timeout performed  Peripheral Block  Patient position: right lateral decubitus  Prep: ChloraPrep  Patient monitoring: continuous pulse ox, frequent blood pressure checks, heart rate and cardiac monitor  Block type: popliteal  Laterality: left  Injection technique: single-shot  Procedures: ultrasound guided, Ultrasound guidance required for the procedure to increase accuracy and safety of medication placement and decrease risk of complications.   Ultrasound permanent image saved  Needle  Needle type: Stimuplex   Needle gauge: 20 G  Needle length: 4 in  Needle localization: anatomical landmarks and ultrasound guidance  Test dose: negative  Assessment  Injection assessment: incremental injection, local visualized surrounding nerve on ultrasound, negative aspiration for heme and no paresthesia on injection  Paresthesia pain: none  Heart rate change: no  Slow fractionated injection: yes  Post-procedure:  site cleaned  patient tolerated the procedure well with no immediate complications

## 2023-07-31 NOTE — DISCHARGE INSTR - AVS FIRST PAGE
Dr. De Guzman Stamp Instructions    1. Take your prescribed medication as directed. 2. Upon arrival at home, lie down and elevate your surgical foot on 2 pillows. 3. Stay off your feet as much as possible for the first 24-48 hours. This is when your feet first swell and may become painful. After 48 hours you may begin following these restrictions:   Nonweightbearing to surgical foot     4. Drink large quantities of water. Consume no alcohol. Continue a well-balanced diet. 5. Report any unusual discomfort or fever to this office. 6. A limited amount of discomfort and swelling is to be expected. In some cases the skin may take on a bruised appearance. The surgical cleansing solution that was applied to your foot prior to the operation is dark in color and the operation site may appear to be oozing when it actually is not. 7. A slight amount of blood is to be expected, and is no cause for alarm. Do not remove the dressings. If there is active bleeding and if the bleeding persists, add additional gauze to the bandage, apply direct pressure, elevate your feet and call this office. 8. Do not get the dressings wet. As regular bathing may be inconvenient, sponge baths are recommended. 9. When anesthesia wears off and if any discomfort should be present, apply an ice pack directly over the operated area for 15 minute intervals for several hours or until the pain leaves. (USE IN EXCESS OF 15 MINUTES COULD CAUSE FROSTBITE). Do not use hot water bags or electric pads. A convenient icepack can be made by placing ice cubes in a plastic bag and covering this with a towel. 10. Take over-the-counter laxative for constipation, this is common with use of narcotic medications.

## 2023-07-31 NOTE — NURSING NOTE
Pt is awake,alert,tolerated diet, written and verbal instructions given to pt and his mother, who verbalize an understanding. Instructions given for Exparel. OOB ambulated with a walker NWB to the left leg.

## 2023-07-31 NOTE — DISCHARGE SUMMARY
Discharge Summary Outpatient Procedure Podiatry -   Cristina Sam 21 y.o. 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 Provisions for Follow-Up Care/Important appointments:  See after visit summary for information related to follow-up care and any pertinent home health orders. Discharge Medications:  See after visit summary for reconciled discharge medications provided to patient and family.

## 2023-08-01 ENCOUNTER — NURSE TRIAGE (OUTPATIENT)
Dept: OTHER | Facility: OTHER | Age: 18
End: 2023-08-01

## 2023-08-01 ENCOUNTER — TELEPHONE (OUTPATIENT)
Age: 18
End: 2023-08-01

## 2023-08-01 ENCOUNTER — TELEPHONE (OUTPATIENT)
Dept: OTHER | Facility: HOSPITAL | Age: 18
End: 2023-08-01

## 2023-08-01 DIAGNOSIS — Q66.89 TARSAL COALITION OF LEFT FOOT: Primary | ICD-10-CM

## 2023-08-01 RX ORDER — OXYCODONE HYDROCHLORIDE AND ACETAMINOPHEN 325; 5 MG/5ML; MG/5ML
5 SOLUTION ORAL EVERY 4 HOURS PRN
Qty: 500 ML | Refills: 0 | Status: SHIPPED | OUTPATIENT
Start: 2023-08-01

## 2023-08-01 NOTE — TELEPHONE ENCOUNTER
Mom calls in , communication consent verified. Patient in severe pain. Doctor ordered percocet tablets but my son cannot swallow pills. He had surgery on his ankle today and now he has  no pain medications at home. Recommended ER evaluation.

## 2023-08-01 NOTE — TELEPHONE ENCOUNTER
Reason for Disposition  • Prescription refill request for a CONTROLLED substance (e.g., narcotics, ADHD medicines)    Answer Assessment - Initial Assessment Questions  1. NAME of MEDICATION: "What medicine are you calling about?"      Oxycodone liquid    2. QUESTION: "What is your question?" (e.g., medication refill, side effect)      "My son had surgery yesterday and needs the oxycodone in a liquid suspension"    3. PRESCRIBING HCP: "Who prescribed it?" Reason: if prescribed by specialist, call should be referred to that group. Podiatry    4.  SYMPTOMS: "Do you have any symptoms?"      Pain    Protocols used: MEDICATION QUESTION CALL-ADULT-

## 2023-08-01 NOTE — TELEPHONE ENCOUNTER
Patients mother called in regarding her sons medication for Percocet. She stated she has called several times and also to the PCP office trying to get help. Script was sent to the pharmacy for tablet form of medication and patient can not swallow it. She stated she requested liquid only to the doctor before discharge and now her son is in extreme pain and can not go another night with out medication. I advised the mother if the patient is in extreme pain it would be best to visit an ER until she hears back from the doctor with a corrected script. I also Advised patients mother to contact the pharmacy to see if they have or are able to get the liquid version as it is not as common and most pharmacies do not have it on hand. This way once script is corrected she has a better idea of where to get it or how long it might take the pharmacy to get. Forwarding To the  As High priority.

## 2023-08-01 NOTE — TELEPHONE ENCOUNTER
Caller: Dayna Ferrer    Doctor/Office: Dr. Ernestina Nichols    #: [phone]    Escalation: Medication/RX for percocet was to be liquid as pt cannot swallow pills. He cannot take what they receieved. Also he is numb yet/is this normal? Also had to put leg down after 2 hours to get relief. is elevating. Please call Mom back and advise/send new Rx to [facility].  Thanks

## 2023-08-01 NOTE — TELEPHONE ENCOUNTER
Regarding: pain after surgery  ----- Message from Keyonna Rojo sent at 7/31/2023 11:31 PM EDT -----  " My son has had surgery earlier today, and he is in a lot of pain.  Hes had two percocets since we came home and aspirin but it doesn't seem to be helping."

## 2023-08-01 NOTE — TELEPHONE ENCOUNTER
Reason for Disposition  • Patient sounds very sick or weak to the triager    Answer Assessment - Initial Assessment Questions  1. SYMPTOM: "What's the main symptom you're concerned about?" (e.g., pain, fever, vomiting)       Patient had surgery on ankle today and is home without pain medication. Unable to swallow pills. 2. ONSET: "When did   start?"           3. SURGERY: "What surgery was performed?"         7 31 2023     4.  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

## 2023-08-01 NOTE — TELEPHONE ENCOUNTER
Caller: patient's mother    Doctor: Antoine Snyder    Reason for call: calling in regards to previous message    Call back#: [phone]

## 2023-08-07 ENCOUNTER — OFFICE VISIT (OUTPATIENT)
Dept: PODIATRY | Facility: CLINIC | Age: 18
End: 2023-08-07

## 2023-08-07 VITALS
BODY MASS INDEX: 28.28 KG/M2 | WEIGHT: 176 LBS | SYSTOLIC BLOOD PRESSURE: 105 MMHG | DIASTOLIC BLOOD PRESSURE: 68 MMHG | HEART RATE: 123 BPM | HEIGHT: 66 IN

## 2023-08-07 DIAGNOSIS — M79.672 PAIN IN LEFT FOOT: Primary | ICD-10-CM

## 2023-08-07 PROCEDURE — 99024 POSTOP FOLLOW-UP VISIT: CPT | Performed by: PODIATRIST

## 2023-08-08 NOTE — PROGRESS NOTES
This patient was seen on 8/7/23. My role is Foot , Ankle, and Wound Specialist    SUBJECTIVE    Chief Complaint:  S/P surgery     Patient ID: Corine Benitez is a 21 y.o. male. Andi Samson is here for a first post-op visit. He presents NWB on the foot with splint in place. He states he's had minimal pain. However, he's very upset that they were late for the visit and went to the wrong office. I note he's loud with his mother and uses foul language when speaking to her. He denies fevers, chills, shortness of breath. The following portions of the patient's history were reviewed and updated as appropriate: allergies, current medications, past family history, past medical history, past social history, past surgical history and problem list.    Review of Systems   Constitutional: Positive for activity change. Negative for appetite change, chills, diaphoresis, fatigue, fever and unexpected weight change. Respiratory: Negative. Musculoskeletal: Positive for gait problem. Psychiatric/Behavioral: Positive for agitation. The patient is nervous/anxious. OBJECTIVE      /68 (BP Location: Right arm, Patient Position: Sitting, Cuff Size: Adult)   Pulse (!) 123   Ht 5' 6" (1.676 m) Comment: verbal  Wt 79.8 kg (176 lb) Comment: verbal  BMI 28.41 kg/m²     Foot/Ankle Musculoskeletal Exam    General    Neurological: alert       Physical Exam  Vitals and nursing note reviewed. Constitutional:       General: He is not in acute distress. Appearance: Normal appearance. He is not ill-appearing, toxic-appearing or diaphoretic. HENT:      Head: Normocephalic. Pulmonary:      Effort: Pulmonary effort is normal.   Neurological:      Mental Status: He is alert. His incisions are coapted without  Necrosis, infection. There is some bruising lateral hindfoot and anterior ankle as expected. NVS intact to foot. Calf soft and non-tender.      ASSESSMENT     Diagnoses and all orders for this visit:    Pain in left foot  -     Cam Boot    Other orders  -     Ibuprofen (CHILDRENS MOTRIN PO); Take by mouth         Problem List Items Addressed This Visit        Other    Pain in left foot - Primary    Relevant Orders    Cam Boot           PLAN    Splint and dressing removed. Fresh DSD applied with instructions to keep it clean, dry and intact. NWB ordered. Camboot fitted to patient. Followup is scheduled.

## 2023-08-14 ENCOUNTER — OFFICE VISIT (OUTPATIENT)
Dept: PODIATRY | Facility: CLINIC | Age: 18
End: 2023-08-14

## 2023-08-14 VITALS
HEIGHT: 66 IN | BODY MASS INDEX: 28.41 KG/M2 | DIASTOLIC BLOOD PRESSURE: 82 MMHG | HEART RATE: 92 BPM | SYSTOLIC BLOOD PRESSURE: 123 MMHG

## 2023-08-14 DIAGNOSIS — Z98.890 POST-OPERATIVE STATE: Primary | ICD-10-CM

## 2023-08-14 PROCEDURE — 99024 POSTOP FOLLOW-UP VISIT: CPT | Performed by: PODIATRIST

## 2023-08-14 NOTE — PROGRESS NOTES
This patient was seen on 8/14/23. Chief Complaint:  Joy Cardenas is here for a post-op visit. Subjective:      Patient ID: Joy Cardenas is a 21 y.o. male. Jose Alves is here for a post-op visit. He presents NWB on the foot with boot in place. He notes diminishing pain, swelling. The following portions of the patient's history were reviewed and updated as appropriate: allergies, current medications, past family history, past medical history, past social history, past surgical history and problem list.    Review of Systems   Constitutional: Negative for activity change, appetite change, chills, diaphoresis, fatigue, fever and unexpected weight change. Respiratory: Negative. Musculoskeletal: Positive for gait problem. Psychiatric/Behavioral: Negative for agitation. The patient is not nervous/anxious. Objective:    Gilma Sam appears stable, non-toxic and alert. /82   Pulse 92   Ht 5' 6" (1.676 m)   BMI 28.41 kg/m²     Foot/Ankle Musculoskeletal Exam    General    Neurological: alert       Physical Exam  Vitals and nursing note reviewed. Constitutional:       General: He is not in acute distress. Appearance: Normal appearance. He is not ill-appearing, toxic-appearing or diaphoretic. HENT:      Head: Normocephalic. Pulmonary:      Effort: Pulmonary effort is normal.   Neurological:      Mental Status: He is alert. The incisions are well-coapted without dehiscence, signs of infection, active drainage, necrosis. Calf is soft and non-tender. Neurovascular status is intact to the foot. Normal post-op edema and bruising is noted. Diagnoses and all orders for this visit:    Post-operative state         Problem List Items Addressed This Visit        Other    Post-operative state - Primary       Assessment:  Healing post-op site. Plan:  Sutures removed. DSD applied.  Instructions given to continue to elevate foot, keep clean and dry until Monday at which point he may remove dressings and shower foot. After that, he's to maintain strict NWB and a clean sock and the camboot at all times.

## 2023-08-21 ENCOUNTER — TELEPHONE (OUTPATIENT)
Age: 18
End: 2023-08-21

## 2023-08-21 NOTE — TELEPHONE ENCOUNTER
Caller: Lilo/mother    Doctor: Esau Junior DPM    Reason for call: We called to cancel Mukund's 8/28/23 appointment. There are no available appointments. Can someone call to schedule him at either office. They either need first or second appointment or 3 or later. Can he wait until his 9/11/23 appointment?     Call back#: Sharath Romero @ [phone]

## 2023-09-11 ENCOUNTER — APPOINTMENT (OUTPATIENT)
Dept: RADIOLOGY | Age: 18
End: 2023-09-11
Payer: COMMERCIAL

## 2023-09-11 ENCOUNTER — OFFICE VISIT (OUTPATIENT)
Dept: PODIATRY | Facility: CLINIC | Age: 18
End: 2023-09-11

## 2023-09-11 VITALS
HEART RATE: 122 BPM | DIASTOLIC BLOOD PRESSURE: 82 MMHG | SYSTOLIC BLOOD PRESSURE: 125 MMHG | HEIGHT: 66 IN | BODY MASS INDEX: 28.41 KG/M2

## 2023-09-11 DIAGNOSIS — Q66.89 TARSAL COALITION OF LEFT FOOT: Primary | ICD-10-CM

## 2023-09-11 DIAGNOSIS — Q66.89 TARSAL COALITION OF LEFT FOOT: ICD-10-CM

## 2023-09-11 PROCEDURE — 99024 POSTOP FOLLOW-UP VISIT: CPT | Performed by: PODIATRIST

## 2023-09-11 PROCEDURE — 73630 X-RAY EXAM OF FOOT: CPT

## 2023-09-13 NOTE — PROGRESS NOTES
This patient was seen on 9/13/23. Chief Complaint:  Mark Stallings is here for a post-op visit. Subjective:      Patient ID: Mark Stallings is a 19 y.o. male. Savanah Deshpande is here for a post-op visit. He presents NWB on the foot with boot in place. He notes diminishing pain, swelling. The following portions of the patient's history were reviewed and updated as appropriate: allergies, current medications, past family history, past medical history, past social history, past surgical history and problem list.    Review of Systems   Constitutional: Negative for activity change, appetite change, chills, diaphoresis, fatigue, fever and unexpected weight change. Respiratory: Negative. Musculoskeletal: Positive for gait problem. Psychiatric/Behavioral: Negative for agitation. The patient is not nervous/anxious. Objective:    Aaron Sam appears stable, non-toxic and alert. /82   Pulse (!) 122   Ht 5' 6" (1.676 m)   BMI 28.41 kg/m²     Foot/Ankle Musculoskeletal Exam    General    Neurological: alert       Physical Exam  Vitals and nursing note reviewed. Constitutional:       General: He is not in acute distress. Appearance: Normal appearance. He is not ill-appearing, toxic-appearing or diaphoretic. HENT:      Head: Normocephalic. Pulmonary:      Effort: Pulmonary effort is normal.   Neurological:      Mental Status: He is alert. The incision is well-coapted without dehiscence, signs of infection, active drainage, necrosis. Calf is soft and non-tender. Neurovascular status is intact to the foot. Minimal edema noted. Diagnoses and all orders for this visit:    Tarsal coalition of left foot  -     X-ray foot left 3+ views;  Future         Problem List Items Addressed This Visit        Musculoskeletal and Integument    Tarsal coalition of left foot - Primary    Relevant Orders    X-ray foot left 3+ views       Assessment:  Healing post-op site.    Plan:  Xrays ordered. The fusion appears solid to me and hardware maintained in alignment. I told him he can begin WBAT in the camboot once he hits a full 6 weeks postop. Follow-up 4 weeks.

## 2023-10-09 ENCOUNTER — OFFICE VISIT (OUTPATIENT)
Dept: PODIATRY | Facility: CLINIC | Age: 18
End: 2023-10-09

## 2023-10-09 VITALS
HEART RATE: 80 BPM | BODY MASS INDEX: 28.93 KG/M2 | HEIGHT: 66 IN | WEIGHT: 180 LBS | DIASTOLIC BLOOD PRESSURE: 73 MMHG | SYSTOLIC BLOOD PRESSURE: 118 MMHG

## 2023-10-09 DIAGNOSIS — Q66.89 TARSAL COALITION OF LEFT FOOT: Primary | ICD-10-CM

## 2023-10-09 PROCEDURE — 99024 POSTOP FOLLOW-UP VISIT: CPT | Performed by: PODIATRIST

## 2023-10-09 NOTE — PROGRESS NOTES
This patient was seen on 10/9/23. Chief Complaint:  Jose Conti is here for a post-op visit. Subjective:      Patient ID: Jose Conti is a 19 y.o. male. Suzie Ortiz is here for a post-op visit. He presents FWB on the foot with boot in place. He notes minimal pain. The following portions of the patient's history were reviewed and updated as appropriate: allergies, current medications, past family history, past medical history, past social history, past surgical history and problem list.    Review of Systems   Constitutional: Negative for activity change, appetite change, chills, diaphoresis, fatigue, fever and unexpected weight change. Respiratory: Negative. Musculoskeletal: Positive for gait problem. Psychiatric/Behavioral: Negative for agitation. The patient is not nervous/anxious. Objective:    Marina Sam appears stable, non-toxic and alert. /73   Pulse 80   Ht 5' 6" (1.676 m)   Wt 81.6 kg (180 lb)   BMI 29.05 kg/m²     Foot/Ankle Musculoskeletal Exam    General    Neurological: alert       Physical Exam  Vitals and nursing note reviewed. Constitutional:       General: He is not in acute distress. Appearance: Normal appearance. He is not ill-appearing, toxic-appearing or diaphoretic. HENT:      Head: Normocephalic. Pulmonary:      Effort: Pulmonary effort is normal.   Neurological:      Mental Status: He is alert. The incision is well-healed without dehiscence, signs of infection, active drainage, necrosis. Calf is soft and non-tender. Neurovascular status is intact to the foot. MInimal edema noted. Diagnoses and all orders for this visit:    Tarsal coalition of left foot  -     Ambulatory referral to Physical Therapy;  Future         Problem List Items Addressed This Visit        Musculoskeletal and Integument    Tarsal coalition of left foot - Primary    Relevant Orders    Ambulatory referral to Physical Therapy Assessment:  Healing post-op site. Plan: Will have him start PT. I told him to remain in the boot until he hits a full 12 weeks post-op at which point he may transition to [facility] in [facility]. Follow-up with me mid-November.

## 2023-10-16 ENCOUNTER — EVALUATION (OUTPATIENT)
Dept: PHYSICAL THERAPY | Facility: CLINIC | Age: 18
End: 2023-10-16
Payer: COMMERCIAL

## 2023-10-16 DIAGNOSIS — Q66.89 TARSAL COALITION OF LEFT FOOT: ICD-10-CM

## 2023-10-16 PROCEDURE — 97162 PT EVAL MOD COMPLEX 30 MIN: CPT

## 2023-10-16 PROCEDURE — 97110 THERAPEUTIC EXERCISES: CPT

## 2023-10-16 NOTE — PROGRESS NOTES
PT Evaluation     Today's date: 10/16/2023  Patient name: Mehul Alvarado  : 2005  MRN: [id number]  Referring provider: Raheel Hamlin DPM  Dx:   Encounter Diagnosis     ICD-10-CM    1. Tarsal coalition of left foot  Q66.89 Ambulatory referral to Physical Therapy          Start Time: 3401  Stop Time: 1250  Total time in clinic (min): 45 minutes    Assessment  Assessment details: Patient is a 18 y/o male presenting to PT s/p L subtalar joint fusion on 23. Upon clinical exam, patient presents with decreased L lateral foot sensation, decreased L ankle ROM, decreased L ankle strength, and abnormal gait. These impairments limit the patient with walking, navigating stairs, and maintaining his balance. Exam findings and clinical signs and symptoms are consistent with post-op status. Patient will benefit from physical therapy to address the above impairments and maximize functional mobility. Impairments: abnormal gait, abnormal muscle firing, abnormal or restricted ROM, abnormal movement, activity intolerance, impaired balance, impaired physical strength, lacks appropriate home exercise program, pain with function, weight-bearing intolerance and poor posture     Symptom irritability: moderateUnderstanding of Dx/Px/POC: good   Prognosis: good    Goals  STG - to be met by week 4  1. Patient will be independent with HEP throughout therapy to prepare for eventual transition to maintenance program.  2. Patient will improve subjective pain to <=4/10 at worst to improve QOL. 3. Patient will improve L ankle DF AROM to 5 deg to walk with less difficulty. 4. Patient will be able to walk without an AD to return to [facility]. LTG - to be met by discharge   1. Patient will improve L ankle AROM to [facility] to perform ADLs with less difficulty. 2. Patient will improve L ankle strength to >=4/5 to improve functional mobility. 3. Patient will improve L SLS to 20" to improve functional mobility.   4. Patient will improve FOTO score to age matched prediction to demonstrate functional improvements. 5. Patient will be able to return to his normal exercise program without difficulty to return to PLOF. Plan  Patient would benefit from: skilled physical therapy  Planned modality interventions: cryotherapy and thermotherapy: hydrocollator packs  Planned therapy interventions: activity modification, balance/weight bearing training, flexibility, functional ROM exercises, gait training, graded activity, graded exercise, home exercise program, therapeutic exercise, therapeutic activities, stretching, patient education, strengthening, neuromuscular re-education, manual therapy, joint mobilization and IASTM  Frequency: 2x week  Duration in weeks: 16  Treatment plan discussed with: patient        Subjective Evaluation    History of Present Illness  Date of surgery: 7/31/2023  Mechanism of injury: surgery  Mechanism of injury: Patient presents 11 weeks s/p L foot arthrodesis/subtalar fusion on 7/31/23 due to a tarsal coalition. Patient reports he had an injury a few years ago that did not heal right and lead to worsening of the coalition. Reports he also has the coalition in his R foot but does not have symptoms. Podiatry note states to continue use of boot until full 12 weeks post-op, then transition to WBAT in [facility]. Reports he has been walking with a RW and CAM boot for about 6 weeks and can walk short distances at home without the walker. Patient reports difficulty with walking, navigating stairs, and maintaining his balance due to his pain. Reports he feels like his leg will give out when walking. Reports mild N/T in his pinky toe. Reports he has an MD follow up in November.              Recurrent probem    Quality of life: good    Patient Goals  Patient goals for therapy: decreased pain, improved balance, increased motion, increased strength and independence with ADLs/IADLs  Patient goal: return to fitness and running  Pain  Current pain ratin  At best pain ratin  At worst pain ratin  Location: L foot  Quality: pressure  Relieving factors: medications  Aggravating factors: standing, walking and stair climbing  Progression: improved    Social Support  Steps to enter house: no  Stairs in house: no     Employment status: not working    Diagnostic Tests  X-ray: normal  Treatments  Previous treatment: immobilization  Current treatment: physical therapy        Objective     Observations   Left Ankle/Foot   Negative for edema. Additional Observation Details  L foot CAM boot donned - WBAT  Gait Analysis: RW use with step to pattern with LLE in CAM boot    Neurological Testing     Additional Neurological Details  Diminished sensation at L lateral foot and 5th toe, all else [facility]    Active Range of Motion   Left Ankle/Foot   Dorsiflexion (ke): -10 degrees   Plantar flexion: 20 degrees with pain  Inversion: 0 degrees   Eversion: 5 degrees with pain    Right Ankle/Foot   Normal active range of motion    Passive Range of Motion   Left Ankle/Foot    Dorsiflexion (ke): -5 degrees   Plantar flexion: 30 degrees with pain  Inversion: 5 degrees   Eversion: 10 degrees with pain    Right Ankle/Foot  Normal passive range of motion    Strength/Myotome Testing     Left Ankle/Foot   Dorsiflexion: 2  Plantar flexion: 2  Inversion: 2-  Eversion: 2-    Right Ankle/Foot   Dorsiflexion: 4-  Plantar flexion: 4-  Inversion: 4-  Eversion: 4-    General Comments:       Ankle/Foot Comments   Single leg balance:   LLE - unable             Precautions: L subtalar fusion on 23 - CAM boot until 10/23/23, then transition to WBAT in sneaker      Manuals 10/16            L ankle PROM                                                    Neuro Re-Ed             Tandem balance             SLS             BIODEX             Weight shifting HEP            LAQ HEP                                      Ther Ex             Bike             Ankle circles/BAPS             Long sit gastroc stretch HEP            Ankle 4-way AROM HEP            Heel raises             Leg press                                       Ther Activity             Step downs             STS             Gait Training             Wean from CAM boot                          Modalities

## 2023-10-23 ENCOUNTER — OFFICE VISIT (OUTPATIENT)
Dept: PHYSICAL THERAPY | Facility: CLINIC | Age: 18
End: 2023-10-23
Payer: COMMERCIAL

## 2023-10-23 DIAGNOSIS — Q66.89 TARSAL COALITION OF LEFT FOOT: Primary | ICD-10-CM

## 2023-10-23 PROCEDURE — 97110 THERAPEUTIC EXERCISES: CPT

## 2023-10-23 PROCEDURE — 97140 MANUAL THERAPY 1/> REGIONS: CPT

## 2023-10-23 PROCEDURE — 97112 NEUROMUSCULAR REEDUCATION: CPT

## 2023-10-23 NOTE — PROGRESS NOTES
Daily Note     Today's date: 10/23/2023  Patient name: Basilio Dance  : 2005  MRN: [id number]  Referring provider: Esau Junior DPM  Dx:   Encounter Diagnosis     ICD-10-CM    1. Tarsal coalition of left foot  Q66.89           Start Time: 1530  Stop Time: 1615  Total time in clinic (min): 45 minutes    Subjective: Reports he called his MD who told him to use the boot until 10/31/23 now. Reports most difficulty with weight shifting and standing balance in the boot. Reports he has an MD follow up in November. Reports he has been doing his HEP and continues to use RW out of the house due to feeling off balance. Objective: See treatment diary below    Mild L anterior ankle abrasion noted - patient notes has been there since cast removal and MD is aware  Incisions: clean, dry, intact      Assessment: Tolerated treatment well. Improved L ankle PROM and AROM noted this session with most limitation noted in eversion. Progressed L hip and knee strength this session to help with standing balance and stability with good tolerance noted. Progressed HEP. Plan to begin transition from CAM boot to WBAT in sneaker next visit per MD instruction. Patient demonstrated fatigue post treatment, exhibited good technique with therapeutic exercises, and would benefit from continued PT      Plan: Continue per plan of care.       Precautions: L subtalar fusion on 23 - CAM boot until 10/31/23, then transition to WBAT in sneaker      Manuals 10/16 10/23           L ankle PROM  [address] [place]                                                  Neuro Re-Ed             Tandem balance             SLS             BIODEX             Weight shifting HEP 10x10"           LAQ HEP 5"x20           SLR  1x15 L  HEP           Towel scrunches  2 min  HEP           Clam shells  2x10 L  HEP           Ther Ex             Bike             Ankle circles/BAPS  Circles x20 ea  HEP           Long sit gastroc stretch HEP 3x30"           Ankle 4-way AROM HEP 2x10 ea           Heel raises  Seated 2x10           Leg press                                       Ther Activity             Step downs             STS             Gait Training             Wean from CAM boot  nv                        Modalities

## 2023-10-26 ENCOUNTER — OFFICE VISIT (OUTPATIENT)
Dept: PHYSICAL THERAPY | Facility: CLINIC | Age: 18
End: 2023-10-26
Payer: COMMERCIAL

## 2023-10-26 DIAGNOSIS — Q66.89 TARSAL COALITION OF LEFT FOOT: Primary | ICD-10-CM

## 2023-10-26 PROCEDURE — 97112 NEUROMUSCULAR REEDUCATION: CPT

## 2023-10-26 PROCEDURE — 97140 MANUAL THERAPY 1/> REGIONS: CPT

## 2023-10-26 PROCEDURE — 97110 THERAPEUTIC EXERCISES: CPT

## 2023-10-26 NOTE — PROGRESS NOTES
Daily Note     Today's date: 10/26/2023  Patient name: Jose Conti  : 2005  MRN: [id number]  Referring provider: Marcos Irizarry DPM  Dx:   Encounter Diagnosis     ICD-10-CM    1. Tarsal coalition of left foot  Q66.89             Start Time: 7674  Stop Time: 1700  Total time in clinic (min): 45 minutes    Subjective: Reports his HEP is going well and he had some mild soreness after last visit. Reports lateral foot discomfort possibly from his boot. Objective: See treatment diary below      Assessment: Tolerated treatment well. Continuing to demonstrate improved L ankle PROM and AROM with most limitations in eversion. Tenderness noted in his L lateral foot and peroneal tendons, so added STM to these regions. Progressed LLE strengthening this session to address weakness and muscle atrophy noted with appropriate fatigue levels and no increase in L foot/ankle pain noted. Plan to begin transition from CAM boot to WBAT in sneaker next week per MD instruction. Patient demonstrated fatigue post treatment, exhibited good technique with therapeutic exercises, and would benefit from continued PT      Plan: Continue per plan of care.       Precautions: L subtalar fusion on 23 - CAM boot until 10/31/23, then transition to WBAT in sneaker      Manuals 10/16 10/23 10/26          L ankle PROM  [address] [facility]          L ankle STM   [address] [facility]                                    Neuro Re-Ed             Tandem balance             SLS             BIODEX             Weight shifting HEP 10x10" 15x10"          LAQ HEP 5"x20 2lb 5"x20          SLR  1x15 L  HEP 2x10 with boot          Towel scrunches  2 min  HEP 2 min          Clam shells  2x10 L  HEP           Ther Ex             Bike             Ankle circles  Circles x20 ea  HEP X20 ea          Long sit gastroc stretch HEP 3x30" 3x30"          Ankle 4-way AROM HEP 2x10 ea 2x10 ea          Heel raises  Seated 2x10 Seated 3x10          Leg press   25lb 2x10          BAPS board Circles x20 ea                       Ther Activity             Step downs             STS             Gait Training             Wean from CAM boot   nv                       Modalities

## 2023-10-30 ENCOUNTER — OFFICE VISIT (OUTPATIENT)
Dept: PHYSICAL THERAPY | Facility: CLINIC | Age: 18
End: 2023-10-30
Payer: COMMERCIAL

## 2023-10-30 DIAGNOSIS — Q66.89 TARSAL COALITION OF LEFT FOOT: Primary | ICD-10-CM

## 2023-10-30 PROCEDURE — 97140 MANUAL THERAPY 1/> REGIONS: CPT

## 2023-10-30 PROCEDURE — 97110 THERAPEUTIC EXERCISES: CPT

## 2023-10-30 PROCEDURE — 97112 NEUROMUSCULAR REEDUCATION: CPT

## 2023-10-30 NOTE — PROGRESS NOTES
Daily Note     Today's date: 10/30/2023  Patient name: Babette Hamman  : 2005  MRN: [id number]  Referring provider: Annie Aleman DPM  Dx:   Encounter Diagnosis     ICD-10-CM    1. Tarsal coalition of left foot  Q66.89               Start Time: 61  Stop Time: 1615  Total time in clinic (min): 45 minutes    Subjective: Reports he is continuing to notice improvements in his L ankle mobility during his HEP. Reports pain when walking in boot and putting pressure through his toes and less pain when avoiding putting pressure through his toes. Objective: See treatment diary below      Assessment: Tolerated treatment well. Initiated transition to [facility] in [facility] this session as patient is 13 weeks post-op with good tolerance noted overall. Discomfort noted when weight bearing through his toes when walking with boot and during forward weight shifting in sneaker with improvement noted after weight shifting. Initiated gait training with RW and sneaker with no increase in pain noted but significant LLE muscle fatigue noted requiring 1 short standing rest break. Also noted L ankle stiffness during gait training. Educated about weaning from boot in 2 hour/day increments and continue RW use due to weakness and decreased LLE stability noted. Continues to demonstrate LLE weakness and muscle atrophy. Improved L ankle mobility noted and progressed L ankle strengthening with no increase in pain noted. Patient demonstrated fatigue post treatment, exhibited good technique with therapeutic exercises, and would benefit from continued PT. Plan: Continue per plan of care.       Precautions: L subtalar fusion on 23 - CAM boot until 10/31/23, then transition to [facility] in [facility]      Manuals 10/16 10/23 10/26 10/30         L ankle PROM  [address] [place]         L ankle STM   [address] [place]         L TC mobs    [address] [place]                      Neuro Re-Ed             Tandem balance             SLS             BIODEX             Weight shifting HEP 10x10" 15x10" Sneaker 10"x15 ea fwd/back, lateral         LAQ HEP 5"x20 2lb 5"x20 5lb 5"x20         SLR  1x15 L  HEP 2x10 with boot          Towel scrunches  2 min  HEP 2 min 2 min         Clam shells  2x10 L  HEP           Ther Ex             Bike             Ankle circles  Circles x20 ea  HEP X20 ea X20 ea         Long sit gastroc stretch HEP 3x30" 3x30" 3x30"         Ankle 4-way AROM HEP 2x10 ea 2x10 ea OTB 2x10 ea         Heel raises  Seated 2x10 Seated 3x10 Seated 3x10         Leg press   25lb 2x10          BAPS board   Circles x20 ea                       Ther Activity             Step downs             STS             Gait Training             Wean from CAM boot   nv 1 lap RW and sneaker                      Modalities

## 2023-11-02 ENCOUNTER — OFFICE VISIT (OUTPATIENT)
Dept: PHYSICAL THERAPY | Facility: CLINIC | Age: 18
End: 2023-11-02
Payer: COMMERCIAL

## 2023-11-02 DIAGNOSIS — Q66.89 TARSAL COALITION OF LEFT FOOT: Primary | ICD-10-CM

## 2023-11-02 PROCEDURE — 97110 THERAPEUTIC EXERCISES: CPT

## 2023-11-02 PROCEDURE — 97112 NEUROMUSCULAR REEDUCATION: CPT

## 2023-11-02 PROCEDURE — 97530 THERAPEUTIC ACTIVITIES: CPT

## 2023-11-02 PROCEDURE — 97140 MANUAL THERAPY 1/> REGIONS: CPT

## 2023-11-02 NOTE — PROGRESS NOTES
Daily Note     Today's date: 2023  Patient name: Lorelei Thapa  : 2005  MRN: [id number]  Referring provider: Claudia Nuñez DPM  Dx:   Encounter Diagnosis     ICD-10-CM    1. Tarsal coalition of left foot  Q66.89                      Subjective: Improving mobility but continues to wear CAM boot outdoors. Objective: See treatment diary below     Precautions: L subtalar fusion on 23 - CAM boot until 10/31/23, then transition to [address] [place] [address] in sneaker    Manuals 10/16 10/23 10/26 10/30 11        L ankle PROM  [address] [facility] [address] [facility] [address] [facility]        L ankle STM   [address] [facility] [address] [facility]        L TC mobs    [address] [facility]                     Neuro Re-Ed             Tandem balance             SLS             BIODEX     WS  Narrow, 40x ea  AP& ML        Weight shifting HEP 10x10" 15x10" Sneaker 10"x15 ea fwd/back, lateral R stance, yolanda 20x        LAQ HEP 5"x20 2lb 5"x20 5lb 5"x20 5# 5"x20        HS curls standing     5# 20x        SLR  1x15 L  HEP 2x10 with boot          Towel scrunches  2 min  HEP 2 min 2 min 2'        Toe yoga     20x ea        Clam shells  2x10 L  HEP           Ther Ex     11/2        Bike     5' L0        Ankle circles  Circles x20 ea  HEP X20 ea X20 ea HEP        Long sit gastroc stretch HEP 3x30" 3x30" 3x30" Runner's 30"x3        Ankle 4-way AROM HEP 2x10 ea 2x10 ea OTB 2x10 ea HEP        Heel raises  Seated 2x10 Seated 3x10 Seated 3x10 15# HR  & TR  30x ea        Leg press   25lb 2x10          BAPS board   Circles x20 ea                       Ther Activity             Step downs             STS             Gait Training             Wean from CAM boot   nv 1 lap RW and sneaker 1 lap RW and sneaker                     Modalities                                          Assessment: Tolerated treatment well. Significant improvement in heel-toe walking at end of session with emphasis on accepting weight onto toes.  Patient demonstrated fatigue post treatment, exhibited good technique with therapeutic exercises, and would benefit from continued PT    Plan: Continue per plan of care. Progress treatment as tolerated.     Moisés Leary

## 2023-11-06 ENCOUNTER — OFFICE VISIT (OUTPATIENT)
Dept: PHYSICAL THERAPY | Facility: CLINIC | Age: 18
End: 2023-11-06
Payer: COMMERCIAL

## 2023-11-06 DIAGNOSIS — Q66.89 TARSAL COALITION OF LEFT FOOT: Primary | ICD-10-CM

## 2023-11-06 PROCEDURE — 97110 THERAPEUTIC EXERCISES: CPT

## 2023-11-06 PROCEDURE — 97112 NEUROMUSCULAR REEDUCATION: CPT

## 2023-11-06 PROCEDURE — 97140 MANUAL THERAPY 1/> REGIONS: CPT

## 2023-11-06 NOTE — PROGRESS NOTES
Daily Note     Today's date: 2023  Patient name: María Edwards  : 2005  MRN: [id number]  Referring provider: Keon Gilman DPM  Dx:   Encounter Diagnosis     ICD-10-CM    1. Tarsal coalition of left foot  Q66.89             Start Time: 1530  Stop Time: 1615  Total time in clinic (min): 45 minutes    Subjective: Reports he has not used the boot since last visit. Reports pain with prolonged standing and walking and after walking short distances without the walker. Reports most pain in his L heel "where the screw is".     Objective: See treatment diary below     Precautions: L subtalar fusion on 23 - CAM boot until 10/31/23, then transition to [address] [place] [address] in [facility]    Manuals 10/16 10/23 10/26 10/30 11/2 11/6       L ankle PROM  [address] [facility] [address] [facility] [address] [place] [address], [address] [facility]       L ankle STM   [address] [facility] [address] [place] [address], [address] [facility]       L TC mobs    [address] [facility]  [address] [facility]                    Neuro Re-Ed             Tandem balance -> SLS      2x20" ea       Step taps      nv       BIODEX     WS  Narrow, 40x ea  AP& ML WS  Narrow, 40x ea  AP& ML; maze x1       Weight shifting HEP 10x10" 15x10" Sneaker 10"x15 ea fwd/back, lateral R stance, yolanda 20x R stance, yolanda 20x       LAQ HEP 5"x20 2lb 5"x20 5lb 5"x20 5# 5"x20 5# 5"x20       HS curls standing     5# 20x        SLR  1x15 L  HEP 2x10 with boot          Towel scrunches  2 min  HEP 2 min 2 min 2' HEP       Toe yoga     20x ea 20x ea       Clam shells  2x10 L  HEP           Ther Ex             Bike     5' L0 5' L0       Ankle circles  Circles x20 ea  HEP X20 ea X20 ea HEP        Long sit gastroc stretch HEP 3x30" 3x30" 3x30" Runner's 30"x3        Ankle 4-way AROM HEP 2x10 ea 2x10 ea OTB 2x10 ea HEP        Heel raises  Seated 2x10 Seated 3x10 Seated 3x10 15# HR  & TR  30x ea 15# HR  & TR  30x ea       Leg press   25lb 2x10   30lb 2x10       BAPS board   Circles x20 ea                       Ther Activity             Step ups      nv       STS             Gait Training             Wean from CAM boot   nv 1 lap RW and sneaker 1 lap RW and sneaker 1.5 lap RW and sneaker                    Modalities                                          Assessment: Tolerated treatment well. Improvements noted overall in walking tolerance, L ankle mobility, and LLE strength. Most pain and difficulty with weight bearing through his toes during heel-toe walking. Patient demonstrated fatigue post treatment, exhibited good technique with therapeutic exercises, and would benefit from continued PT    Plan: Continue per plan of care. Progress treatment as tolerated.     Fan Valiente

## 2023-11-09 ENCOUNTER — OFFICE VISIT (OUTPATIENT)
Dept: PHYSICAL THERAPY | Facility: CLINIC | Age: 18
End: 2023-11-09
Payer: COMMERCIAL

## 2023-11-09 DIAGNOSIS — Q66.89 TARSAL COALITION OF LEFT FOOT: Primary | ICD-10-CM

## 2023-11-09 PROCEDURE — 97140 MANUAL THERAPY 1/> REGIONS: CPT

## 2023-11-09 PROCEDURE — 97110 THERAPEUTIC EXERCISES: CPT

## 2023-11-09 PROCEDURE — 97112 NEUROMUSCULAR REEDUCATION: CPT

## 2023-11-09 NOTE — PROGRESS NOTES
Daily Note     Today's date: 2023  Patient name: Shaina Metz  : 2005  MRN: [id number]  Referring provider: Roberto Wallace DPM  Dx:   Encounter Diagnosis     ICD-10-CM    1. Tarsal coalition of left foot  Q66.89               Start Time:   Stop Time: 161  Total time in clinic (min): 45 minutes    Subjective: Reports he has some pain today rated 4/10. Reports he had more pain yesterday after doing a lot of standing. Reports he is able to walk without the walker for short distances but gets pain when shifting weight onto his toes, so he will try to walk flat footed.     Objective: See treatment diary below     Precautions: L subtalar fusion on 23 - CAM boot until 10/31/23, then transition to [facility] in sneaker    Manuals 10/16 10/23 10/26 10/30 11/2 11/6 11/9      L ankle PROM  [address], [address] [facility] [address] [facility] [address], [address] [place] [address] [place] [address] [facility] [address], [address] [facility]      L ankle STM   [address] [facility] [address] [place] [address] [place] [address] [facility] [address] [place]      L TC mobs    [address], [address] [place] [address] [facility] [address], [address] [facility]                   Neuro Re-Ed             Tandem balance -> SLS      2x20" ea 2x30" ea      Step taps      nv       BIODEX     WS  Narrow, 40x ea  AP& ML WS  Narrow, 40x ea  AP& ML; maze x1 WS lateral, limits of stability      Weight shifting HEP 10x10" 15x10" Sneaker 10"x15 ea fwd/back, lateral R stance, yolanda 20x R stance, yolanda 20x R stance, yolanda 20x      LAQ HEP 5"x20 2lb 5"x20 5lb 5"x20 5# 5"x20 5# 5"x20       HS curls standing     5# 20x        SLR  1x15 L  HEP 2x10 with boot          Towel scrunches  2 min  HEP 2 min 2 min 2' HEP       Toe yoga     20x ea 20x ea 20x ea      Clam shells  2x10 L  HEP           Ther Ex             Bike     5' L0 5' L0 5' L0      Ankle circles  Circles x20 ea  HEP X20 ea X20 ea HEP        Long sit gastroc stretch HEP 3x30" 3x30" 3x30" Runner's 30"x3        Ankle 4-way AROM HEP 2x10 ea 2x10 ea OTB 2x10 ea HEP        Heel raises  Seated 2x10 Seated 3x10 Seated 3x10 15# HR  & TR  30x ea 15# HR  & TR  30x ea 15# HR  & TR  30x ea      Leg press   25lb 2x10   30lb 2x10 35lb 3x10      BAPS board   Circles x20 ea                       Ther Activity             Step ups      nv 6in 1x10 L Edu for home      STS             Gait Training             Wean from CAM boot   nv 1 lap RW and sneaker 1 lap RW and sneaker 1.5 lap RW and sneaker                    Modalities                                          Assessment: Tolerated treatment well. Continued LLE weakness and fatigue noted with weight bearing exercises but making improvements overall. Most difficulty noted with bearing weight through his toes with pain and weakness noted. Educated about using "up with the good, down with the bad" when trying stairs for the first time at home, but able to perform with his LLE for strengthening with no increase in pain noted. Patient demonstrated fatigue post treatment, exhibited good technique with therapeutic exercises, and would benefit from continued PT    Plan: Continue per plan of care. Progress treatment as tolerated.     Berta Green

## 2023-11-13 ENCOUNTER — OFFICE VISIT (OUTPATIENT)
Dept: PODIATRY | Facility: CLINIC | Age: 18
End: 2023-11-13
Payer: COMMERCIAL

## 2023-11-13 VITALS — BODY MASS INDEX: 26.39 KG/M2 | HEIGHT: 66 IN | WEIGHT: 164.2 LBS

## 2023-11-13 DIAGNOSIS — M79.672 CHRONIC FOOT PAIN, LEFT: Primary | ICD-10-CM

## 2023-11-13 DIAGNOSIS — G89.29 CHRONIC FOOT PAIN, LEFT: Primary | ICD-10-CM

## 2023-11-13 PROCEDURE — 99213 OFFICE O/P EST LOW 20 MIN: CPT | Performed by: PODIATRIST

## 2023-11-13 NOTE — PROGRESS NOTES
This patient was seen on 11/13/23. My role is Foot , Ankle, and Wound Specialist    SUBJECTIVE    Chief Complaint:  Post-op     Patient ID: Iwona Gaspar is a 19 y.o. male. Fran Chavez is here for a post-op visit. He presents FWB on the foot in sneaker. He notes minimal pain. He's been going to PT. He notes now less pain when on the foot for a long period of time that prior to surgery and also no residual morning pain (that he used to have prior to surgery). The following portions of the patient's history were reviewed and updated as appropriate: allergies, current medications, past family history, past medical history, past social history, past surgical history and problem list.    Review of Systems   Constitutional:  Negative for activity change, appetite change, chills, diaphoresis, fatigue, fever and unexpected weight change. Respiratory: Negative. Musculoskeletal:  Positive for gait problem. Psychiatric/Behavioral:  Negative for agitation. The patient is not nervous/anxious. OBJECTIVE      Ht 5' 6" (1.676 m)   Wt 74.5 kg (164 lb 3.2 oz)   BMI 26.50 kg/m²     Foot/Ankle Musculoskeletal Exam    General    Neurological: alert  General additional comments: I note he can double heel rise easily. He cannot single heel rise on the surgical side. Physical Exam  Vitals and nursing note reviewed. Constitutional:       General: He is not in acute distress. Appearance: Normal appearance. He is not ill-appearing, toxic-appearing or diaphoretic. HENT:      Head: Normocephalic. Pulmonary:      Effort: Pulmonary effort is normal.   Musculoskeletal:      Comments: I note he can double heel rise easily. He cannot single heel rise on the surgical side. Neurological:      Mental Status: He is alert. ASSESSMENT     Diagnoses and all orders for this visit:    Chronic foot pain, left  -     Ambulatory referral to Physical Therapy;  Future         Problem List Items Addressed This Visit          Other    Chronic foot pain, left - Primary    Relevant Orders    Ambulatory referral to Physical Therapy           PLAN    I recommend he continue PT and follow-up 6 weeks.

## 2023-11-14 ENCOUNTER — OFFICE VISIT (OUTPATIENT)
Dept: PHYSICAL THERAPY | Facility: CLINIC | Age: 18
End: 2023-11-14
Payer: COMMERCIAL

## 2023-11-14 DIAGNOSIS — Q66.89 TARSAL COALITION OF LEFT FOOT: Primary | ICD-10-CM

## 2023-11-14 PROCEDURE — 97112 NEUROMUSCULAR REEDUCATION: CPT

## 2023-11-14 PROCEDURE — 97140 MANUAL THERAPY 1/> REGIONS: CPT

## 2023-11-14 PROCEDURE — 97110 THERAPEUTIC EXERCISES: CPT

## 2023-11-14 NOTE — PROGRESS NOTES
Daily Note     Today's date: 2023  Patient name: Dong Barahona  : 2005  MRN: [id number]  Referring provider: Theresa Cobb DPM  Dx:   Encounter Diagnosis     ICD-10-CM    1. Tarsal coalition of left foot  Q66.89                 Start Time:   Stop Time: 161  Total time in clinic (min): 45 minutes    Subjective: Reports he saw the doctor who told him to continue with PT. Reports his foot is feeling better but he still gets "stiff and pain" after standing for longer periods of time. Reports he is able to stand for 30 min without pain when he was at 4 hours prior to surgery.     Objective: See treatment diary below     Precautions: L subtalar fusion on 23 - CAM boot until 10/31/23, then transition to [facility] in sneaker    Manuals 10/16 10/23 10/26 10/30 11/2 11/6 11/9 11/14     L ankle PROM  [address] [facility] [address], [address] [facility] [address], [address] [place] [address] [place] [address], [address] [facility] [address], 3 [facility] [address], [address] [facility]     L ankle STM   [address], 3 [facility] [address], [address] [place] [address] [place] [address], 3 [facility] [address], 3 [facility] [address], [address] [place]     L TC mobs    [address], [address] [place] [address], 3 [facility] [address], 3 [facility] [address], 3 [facility]                  Neuro Re-Ed             Tandem balance -> SLS      2x20" ea 2x30" ea 2x30" ea     Step taps      nv  4 in 2x10                  BIODEX     WS  Narrow, 40x ea  AP& ML WS  Narrow, 40x ea  AP& ML; maze x1 WS lateral, limits of stability Mazex1, limits of stabilityx1, catchx1     Weight shifting HEP 10x10" 15x10" Sneaker 10"x15 ea fwd/back, lateral R stance, yolanda 20x R stance, yolanda 20x R stance, yolanda 20x B stance, yolanda 20x     LAQ HEP 5"x20 2lb 5"x20 5lb 5"x20 5# 5"x20 5# 5"x20       HS curls standing     5# 20x        SLR  1x15 L  HEP 2x10 with boot          Towel scrunches  2 min  HEP 2 min 2 min 2' HEP       Toe yoga     20x ea 20x ea 20x ea 20x ea     Clam shells  2x10 L  HEP           Ther Ex     /        Bike     5' L0 5' L0 5' L0 5' L0     Ankle circles  Circles x20 ea  HEP X20 ea X20 ea HEP        Long sit gastroc stretch HEP 3x30" 3x30" 3x30" Runner's 30"x3        Ankle 4-way AROM HEP 2x10 ea 2x10 ea OTB 2x10 ea HEP        Heel raises  Seated 2x10 Seated 3x10 Seated 3x10 15# HR  & TR  30x ea 15# HR  & TR  30x ea 15# HR  & TR  30x ea Seated 15# HR  & TR  30x ea  Standing B 1x10     Leg press   25lb 2x10   30lb 2x10 35lb 3x10 45lb 3x10     BAPS board   Circles x20 ea          HR on leg press        45lb 2x10     Ther Activity             Step ups      nv 6in 1x10 L Edu for home 4 in 2x10     STS             Gait Training             Wean from CAM boot   nv 1 lap RW and sneaker 1 lap RW and sneaker 1.5 lap RW and sneaker                    Modalities                                          Assessment: Tolerated treatment well. Continuing to progress L ankle and LE stability to improve standing and walking tolerance. Most challenge and fatigue with heel raises due to calf weakness with pain noted shifting weight onto his LLE during heel-toe gait. Patient demonstrated fatigue post treatment, exhibited good technique with therapeutic exercises, and would benefit from continued PT. Plan: Continue per plan of care. Progress treatment as tolerated.     Fan Valiente

## 2023-11-16 ENCOUNTER — OFFICE VISIT (OUTPATIENT)
Dept: PHYSICAL THERAPY | Facility: CLINIC | Age: 18
End: 2023-11-16
Payer: COMMERCIAL

## 2023-11-16 DIAGNOSIS — Q66.89 TARSAL COALITION OF LEFT FOOT: Primary | ICD-10-CM

## 2023-11-16 PROCEDURE — 97140 MANUAL THERAPY 1/> REGIONS: CPT

## 2023-11-16 PROCEDURE — 97112 NEUROMUSCULAR REEDUCATION: CPT

## 2023-11-16 PROCEDURE — 97110 THERAPEUTIC EXERCISES: CPT

## 2023-11-16 NOTE — PROGRESS NOTES
PT Re-evaluation     Today's date: 2023  Patient name: Hiren Stover  : 2005  MRN: [id number]  Referring provider: Nargis Beaulieu DPM  Dx:   Encounter Diagnosis     ICD-10-CM    1. Tarsal coalition of left foot  Q66.89                   Start Time: 1530  Stop Time: 1615  Total time in clinic (min): 45 minutes    Subjective: Patient reports he has noticed improvements in his L ankle mobility, pain levels, strength, and ability to walk without the boot and without the walker. Reports most difficulty with his balance, navigating stairs, and with walking longer distances. Reports he only uses the walker when walking outside the house. Reports pain and stiffness after standing and walking longer than 30 minutes that reaches 6/10. Reports continued numbness and increased pain at his L lateral foot. Reports he has difficulty with walking heel-toe and shifting weight onto his toes due to pain and stiffness. Reports he would like to continue working on his balance and walking tolerance. Objective: See treatment diary below    Goals  STG - to be met by week 4  1. Patient will be independent with HEP throughout therapy to prepare for eventual transition to maintenance program. - goal met  2. Patient will improve subjective pain to <=4/10 at worst to improve QOL. - in progress  3. Patient will improve L ankle DF AROM to 5 deg to walk with less difficulty. - goal met  4. Patient will be able to walk without an AD to return to [facility]. - in progress     LTG - to be met by discharge   1. Patient will improve L ankle AROM to [facility] to perform ADLs with less difficulty. - in progress  2. Patient will improve L ankle strength to >=4/5 to improve functional mobility. - in progress  3. Patient will improve L SLS to 20" to improve functional mobility. - in progress  4. Patient will improve FOTO score to age matched prediction to demonstrate functional improvements. - in progress  5.  Patient will be able to return to his normal exercise program without difficulty to return to [facility]. - in progress    Observations     Additional Observation Details  Gait Analysis: RW use with step through pattern      Neurological Testing      Additional Neurological Details  Diminished sensation at L lateral foot and 5th toe, all else [facility]     Active Range of Motion   Left Ankle/Foot   Dorsiflexion (ke): 5 deg / -10 degrees   Plantar flexion: 50 deg / 20 degrees with pain  Inversion: 20 deg / 0 degrees   Eversion: 5 deg / 5 degrees with pain     Right Ankle/Foot   Normal active range of motion     Passive Range of Motion   Left Ankle/Foot     Dorsiflexion (ke): 5 deg / -5 degrees   Plantar flexion: 55 deg / 30 degrees with pain  Inversion: 25 deg / 5 degrees   Eversion: 10 deg / 10 degrees with pain     Right Ankle/Foot  Normal passive range of motion     Strength/Myotome Testing      Left Ankle/Foot   Dorsiflexion: 4- / 2  Plantar flexion: 3 / 2  Inversion: 3+ / 2-  Eversion: 3 / 2-     Right Ankle/Foot   Dorsiflexion: 4-  Plantar flexion: 4-  Inversion: 4-  Eversion: 4-     General Comments:         Ankle/Foot Comments   Single leg balance:   LLE - 2 sec / unable     Precautions: L subtalar fusion on 7/31/23 - CAM boot until 10/31/23, then transition to [facility] in [facility]    Manuals 10/16 10/23 10/26 10/30 11/2 11/6 11/9 11/14 11/16    L ankle PROM  [address], [address] [facility] [address], [address] [facility] [address], 3 [facility] [address] [place] [address], [address] [facility] [address], [address] [facility] [address], [address] [facility] [address], 3 [facility]    L ankle STM   [address], 3 [facility] [address], 3 [facility] [address] [place] [address], [address] [facility] [address], [address] [facility] [address], 3 [facility] [address], 3 [facility]    L TC mobs    [address], 3 [facility] [address] [place] [address], [address] [facility] [address], [address] [facility] [address], 3 [facility] [address], 3 [facility]                 Neuro Re-Ed     11/2        Tandem balance       2x20" ea 2x30" ea 2x30" ea 2x30" ea    Step taps      nv  4 in 2x10 4 in 2x10    Foam balance         2x30"    SLS         2x30"    BIODEX     WS  Narrow, 40x ea  AP& ML WS  Narrow, 40x ea  AP& ML; maze x1 WS lateral, limits of stability Mazex1, limits of stabilityx1, catchx1     Weight shifting HEP 10x10" 15x10" Sneaker 10"x15 ea fwd/back, lateral R stance, yolanda 20x R stance, yolanda 20x R stance, yolanda 20x B stance, yolanda 20x     LAQ HEP 5"x20 2lb 5"x20 5lb 5"x20 5# 5"x20 5# 5"x20       HS curls standing     5# 20x        SLR  1x15 L  HEP 2x10 with boot          Towel scrunches  2 min  HEP 2 min 2 min 2' HEP       Toe yoga     20x ea 20x ea 20x ea 20x ea     Clam shells  2x10 L  HEP           Ther Ex     11/2        Bike     5' L0 5' L0 5' L0 5' L0     Ankle circles  Circles x20 ea  HEP X20 ea X20 ea HEP        Long sit gastroc stretch HEP 3x30" 3x30" 3x30" Runner's 30"x3        Ankle 4-way AROM HEP 2x10 ea 2x10 ea OTB 2x10 ea HEP    GRN 2x10 ea    Heel raises  Seated 2x10 Seated 3x10 Seated 3x10 15# HR  & TR  30x ea 15# HR  & TR  30x ea 15# HR  & TR  30x ea Seated 15# HR  & TR  30x ea  Standing B 1x10 Seated 20# HR  & TR  30x ea  Standing B 2x10    Leg press   25lb 2x10   30lb 2x10 35lb 3x10 45lb 3x10 55lb 3x10    BAPS board   Circles x20 ea          HR on leg press        45lb 2x10     Ther Activity             Step ups      nv 6in 1x10 L Edu for home 4 in 2x10 4 in 2x10    Hurdles         3 laps step to    STS             Gait Training             Wean from CAM boot   nv 1 lap RW and sneaker 1 lap RW and sneaker 1.5 lap RW and sneaker                    Modalities                                          Assessment: Tolerated treatment well. Patient demonstrated fatigue post treatment, exhibited good technique with therapeutic exercises, and would benefit from continued PT. Patient presents to PT for a progress note. Subjectively, patient reports he has noticed improvements in his L ankle mobility, strength, and walking tolerance since beginning PT. Objectively, patient has made improvements in L ankle ROM, L ankle strength, static balance, and gait quality. Despite these improvements, patient continues to demonstrate deficits in L ankle AROM, L ankle strength, LLE strength, static balance, and dynamic balance leading to difficulty with prolonged standing, walking longer distances and stair navigation.  Patient would benefit from continued PT to address above impairments and achieve remaining therapy goals. Plan: Continue per plan of care. Progress treatment as tolerated.     Vinay Vargas

## 2023-11-21 ENCOUNTER — OFFICE VISIT (OUTPATIENT)
Dept: PHYSICAL THERAPY | Facility: CLINIC | Age: 18
End: 2023-11-21
Payer: COMMERCIAL

## 2023-11-21 DIAGNOSIS — Q66.89 TARSAL COALITION OF LEFT FOOT: Primary | ICD-10-CM

## 2023-11-21 PROCEDURE — 97140 MANUAL THERAPY 1/> REGIONS: CPT

## 2023-11-21 PROCEDURE — 97110 THERAPEUTIC EXERCISES: CPT

## 2023-11-21 PROCEDURE — 97112 NEUROMUSCULAR REEDUCATION: CPT

## 2023-11-21 NOTE — PROGRESS NOTES
Daily Note     Today's date: 2023  Patient name: Prema Moseley  : 2005  MRN: [id number]  Referring provider: Mallory Parks DPM  Dx:   Encounter Diagnosis     ICD-10-CM    1. Tarsal coalition of left foot  Q66.89             Start Time: 68  Stop Time: 1300  Total time in clinic (min): 45 minutes    Subjective: Patient reports he went to the store prior to PT so feels a little "stiff". Reports he is able to walk more "heel-toe" in the house but struggles outside on firmer surfaces.     Objective: See treatment diary below       Precautions: L subtalar fusion on 23 - CAM boot until 10/31/23, then transition to [facility] in [facility]    Manuals 10/16 10/23 10/26 10/30 11/2 11/6 11/9 11/14 11/16 11/21   L ankle PROM  [address], 3 [facility] [address], 3 [facility] [address], [address] [place] [address], 3 [facility] [address] [facility] [address] [facility] [address] [facility] [address] [facility]   L ankle STM   [address] [facility] [address] [place] [address] [facility] [address] [facility] [address] [facility] [address] [facility] [address] [facility]   L TC mobs    [address] [place] [address], 3 [facility] [address], 3 [facility] [address], 3 [facility] [address], 3 [facility] [address], 3 [facility]                Neuro Re-Ed             Tandem balance       2x20" ea 2x30" ea 2x30" ea 2x30" ea 2x30" ea   Step taps      nv  4 in 2x10 4 in 2x10 4 in 2x10   Foam balance         2x30" 2x30"   SLS         2x30" 2x30"   BIODEX     WS  Narrow, 40x ea  AP& ML WS  Narrow, 40x ea  AP& ML; maze x1 WS lateral, limits of stability Mazex1, limits of stabilityx1, catchx1     Weight shifting HEP 10x10" 15x10" Sneaker 10"x15 ea fwd/back, lateral R stance, yolanda 20x R stance, yolanda 20x R stance, yolanda 20x B stance, yolanda 20x     LAQ HEP 5"x20 2lb 5"x20 5lb 5"x20 5# 5"x20 5# 5"x20       HS curls standing     5# 20x        SLR  1x15 L  HEP 2x10 with boot          Towel scrunches  2 min  HEP 2 min 2 min 2' HEP       Toe yoga     20x ea 20x ea 20x ea 20x ea     Clam shells  2x10 L  HEP           Tandem walking             Ther Ex     /        Bike     5' L0 5' L0 5' L0 5' L0  5' L0   Ankle circles  Circles x20 ea  HEP X20 ea X20 ea HEP        Long sit gastroc stretch HEP 3x30" 3x30" 3x30" Runner's 30"x3        Ankle 3-way AROM HEP 2x10 ea 2x10 ea OTB 2x10 ea HEP    GRN 2x10 ea GRN 2x10 ea Heel raises  Seated 2x10 Seated 3x10 Seated 3x10 15# HR  & TR  30x ea 15# HR  & TR  30x ea 15# HR  & TR  30x ea Seated 15# HR  & TR  30x ea  Standing B 1x10 Seated 20# HR  & TR  30x ea  Standing B 2x10 Seated 20# HR  & TR  30x ea; Standing B 3x10   Leg press   25lb 2x10   30lb 2x10 35lb 3x10 45lb 3x10 55lb 3x10 65lb 3x10   BAPS board   Circles x20 ea          HR on leg press        45lb 2x10  L SL 15lb 2x10   Ther Activity             Step ups      nv 6in 1x10 L Edu for home 4 in 2x10 4 in 2x10 4 in 2x10   Hurdles         3 laps step to 3 laps step to   [employer]          2 flights with Edu   Gait Training             Wean from CAM boot   nv 1 lap RW and sneaker 1 lap RW and sneaker 1.5 lap RW and sneaker                    Modalities                                          Assessment: Tolerated treatment well. Patient demonstrated fatigue post treatment, exhibited good technique with therapeutic exercises, and would benefit from continued PT. Improved static balance noted this session with continued difficulty noted with bearing weight through his forefoot. Educated patient about stair navigation with up with the good and down with the bad. Able to ascend stairs with his LLE without pain but muscle fatigue. Plan: Continue per plan of care. Progress treatment as tolerated.     Fabrizio Pink

## 2023-11-27 ENCOUNTER — VBI (OUTPATIENT)
Dept: ADMINISTRATIVE | Facility: OTHER | Age: 18
End: 2023-11-27

## 2023-11-27 ENCOUNTER — OFFICE VISIT (OUTPATIENT)
Dept: PHYSICAL THERAPY | Facility: CLINIC | Age: 18
End: 2023-11-27
Payer: COMMERCIAL

## 2023-11-27 DIAGNOSIS — Q66.89 TARSAL COALITION OF LEFT FOOT: Primary | ICD-10-CM

## 2023-11-27 PROCEDURE — 97530 THERAPEUTIC ACTIVITIES: CPT

## 2023-11-27 PROCEDURE — 97110 THERAPEUTIC EXERCISES: CPT

## 2023-11-27 PROCEDURE — 97140 MANUAL THERAPY 1/> REGIONS: CPT

## 2023-11-27 NOTE — PROGRESS NOTES
Daily Note     Today's date: 2023  Patient name: Lizzie Jaeger  : 2005  MRN: [id number]  Referring provider: Micheal Villalta DPM  Dx:   Encounter Diagnosis     ICD-10-CM    1. Tarsal coalition of left foot  Q66.89               Start Time: 1400  Stop Time: 1445  Total time in clinic (min): 45 minutes    Subjective: Patient reports he has noticed improvements in the past week. Reports he was able to go up the stairs over the weekend and he has noticed less pain with "heel-toe walking". Reports he still feels unsteady when walking without the walker outside the house.     Objective: See treatment diary below       Precautions: L subtalar fusion on 23 - CAM boot until 10/31/23, then transition to [address] [place] [address] in [employer]    Manuals     L ankle PROM [address], [address] [place] [address], [address] [facility] [address] [facility] [address] [facility] [address] [place]    L ankle  [address] [place] [address] [place] [address] [facility] [address] [facility] [address] [place]    L TC mobs [address], [address] [facility] [address], [address] [facility] [address], [address] [facility] [address], [address] [facility] [address], [address] [place]             Neuro Re-Ed         Tandem balance  2x30" ea 2x30" ea 2x30" ea 2x30" ea On foam 2x30" ea    Step taps  4 in 2x10 4 in 2x10 4 in 2x10     Foam balance   2x30" 2x30"     SLS   2x30" 2x30" 3x30"    BIODEX WS lateral, limits of stability Mazex1, limits of stabilityx1, catchx1       Weight shifting R stance, yolanda 20x B stance, yolanda 20x       Tandem walking         HS curls standing         SLR         Towel scrunches         Toe yoga 20x ea 20x ea       Clam shells         Tandem walking         Ther Ex         Bike 5' L0 5' L0  5' L0 5' L0    Ankle circles         Standing gastroc stretch     5x10"    Ankle 3-way   GRN 2x10 ea GRN 2x10 ea GRN 2x10 ea    Heel raises 15# HR  & TR  30x ea Seated 15# HR  & TR  30x ea  Standing B 1x10 Seated 20# HR  & TR  30x ea  Standing B 2x10 Seated 20# HR  & TR  30x ea; Standing B 3x10 Standing B 3x10    Leg press 35lb 3x10 45lb 3x10 55lb 3x10 65lb 3x10 75lb 3x10    Toe walking         HR on leg press  45lb 2x10  L SL 15lb 2x10 L SL 15lb 2x10    Ther Activity         Step ups 6in 1x10 L Edu for home 4 in 2x10 4 in 2x10 4 in 2x10 6 in 2x10    Hurdles   3 laps step to 3 laps step to 3 laps step to,2 laps step through    Step downs     6 in 1x10    Sit to stands     2x10    Stairs    2 flights with Edu     Gait Training         Wean from CAM boot                  Modalities                              Assessment: Tolerated treatment well. Patient demonstrated fatigue post treatment, exhibited good technique with therapeutic exercises, and would benefit from continued PT. Most difficulty with heel raises with muscle shaking noted when on his toes due to weakness. Added sit to stands and standing heel raises with counter support to HEP to progress LLE strength. Plan: Continue per plan of care. Progress treatment as tolerated.     Clair Trejo

## 2023-11-30 ENCOUNTER — OFFICE VISIT (OUTPATIENT)
Dept: PHYSICAL THERAPY | Facility: CLINIC | Age: 18
End: 2023-11-30
Payer: COMMERCIAL

## 2023-11-30 DIAGNOSIS — Q66.89 TARSAL COALITION OF LEFT FOOT: Primary | ICD-10-CM

## 2023-11-30 PROCEDURE — 97110 THERAPEUTIC EXERCISES: CPT

## 2023-11-30 PROCEDURE — 97140 MANUAL THERAPY 1/> REGIONS: CPT

## 2023-11-30 NOTE — PROGRESS NOTES
PT Re-evaluation     Today's date: 2023  Patient name: Piero Saleh  : 2005  MRN: [id number]  Referring provider: Ingris Gonzales DPM  Dx:   Encounter Diagnosis     ICD-10-CM    1. Tarsal coalition of left foot  Q66.89                 Start Time: 1100  Stop Time: 1145  Total time in clinic (min): 45 minutes    Subjective: Patient reports he has noticed improvements in his L ankle mobility, walking tolerance, standing tolerance, and pain levels. Reports continued difficulty with putting weight through his toes, walking without an AD, and standing or walking for longer than 30 minutes at a time. Reports he notices continued weakness and difficulty with his balance while walking. Reports slightly improving sensation in his L lateral foot but it is still diminished overall. Reports 5/10 pain at worst. Reports he has an MD follow up in January. Objective: See treatment diary below    Goals  STG - to be met by week 4  1. Patient will be independent with HEP throughout therapy to prepare for eventual transition to maintenance program. - goal met  2. Patient will improve subjective pain to <=4/10 at worst to improve QOL. - in progress  3. Patient will improve L ankle DF AROM to 5 deg to walk with less difficulty. - goal met  4. Patient will be able to walk without an AD to return to PLOF. - in progress     LTG - to be met by discharge   1. Patient will improve L ankle AROM to [facility] to perform ADLs with less difficulty. - goal met  2. Patient will improve L ankle strength to >=4/5 to improve functional mobility. - in progress  3. Patient will improve L SLS to 20" to improve functional mobility. - in progress  4. Patient will improve FOTO score to age matched prediction to demonstrate functional improvements. - in progress  5. Patient will be able to return to his normal exercise program without difficulty to return to PLOF.  - in progress     Observations      Additional Observation Details  Gait Analysis: RW use with step through pattern      Neurological Testing      Additional Neurological Details  Diminished sensation at L lateral foot and 5th toe, all else [facility]     Active Range of Motion   Left Ankle/Foot   Dorsiflexion (ke): 8 deg / -10 degrees   Plantar flexion: 50 deg / 20 degrees with pain  Inversion: 20 deg / 0 degrees   Eversion: 5 deg / 5 degrees with pain     Right Ankle/Foot   Normal active range of motion     Passive Range of Motion   Left Ankle/Foot     Dorsiflexion (ke): 10 deg / -5 degrees   Plantar flexion: 55 deg / 30 degrees with pain  Inversion: 25 deg / 5 degrees   Eversion: 10 deg / 10 degrees with pain     Right Ankle/Foot  Normal passive range of motion     Strength/Myotome Testing      Left Ankle/Foot   Dorsiflexion: 4 / 2  Plantar flexion: 3 / 2  Inversion: 4- / 2-  Eversion: 3+ / 2-     Right Ankle/Foot   Dorsiflexion: 4 / 4-  Plantar flexion: 4 / 4-  Inversion: 4 / 4-  Eversion: 4 / 4-     General Comments:         Ankle/Foot Comments   Single leg balance:   LLE - 4 sec / unable       Precautions: L subtalar fusion on 7/31/23 - CAM boot until 10/31/23, then transition to [address] [place] [address] in [employer]    Manuals 11/9 11/14 11/16 11/21 11/27 11/30   L ankle PROM [address], [address] [place] [address], [address] [place] [address], [address] [place] [address], [address] [place] [address], [address] [place] [address], [address] [place]   L ankle  [address] [place] [address], [address] [place] [address], [address] [place] [address], [address] [place] [address], [address] [place] [address], [address] [place]   L TC mobs [address], [address] [place] [address], [address] [place] [address], [address] [place] [address], [address] [place] [address], [address] [place] [address], [address] [place]            Neuro Re-Ed         Tandem balance  2x30" ea 2x30" ea 2x30" ea 2x30" ea On foam 2x30" ea    Step taps  4 in 2x10 4 in 2x10 4 in 2x10     Foam balance   2x30" 2x30"     SLS   2x30" 2x30" 3x30" 3x30"   BIODEX WS lateral, limits of stability Mazex1, limits of stabilityx1, catchx1       Weight shifting R stance, yolanda 20x B stance, yolanda 20x       Tandem walking      3 laps   HS curls standing         SLR         Towel scrunches         Toe yoga 20x ea 20x ea       Clam shells         Tandem walking         Ther Ex         Bike 5' L0 5' L0  5' L0 5' L0 5' L0   Ankle circles         Standing gastroc stretch     5x10" 3x20"   Ankle 3-way   GRN 2x10 ea GRN 2x10 ea GRN 2x10 ea GRN 2x10 ea   Heel raises 15# HR  & TR  30x ea Seated 15# HR  & TR  30x ea  Standing B 1x10 Seated 20# HR  & TR  30x ea  Standing B 2x10 Seated 20# HR  & TR  30x ea; Standing B 3x10 Standing B 3x10 Standing B 3x10   Leg press 35lb 3x10 45lb 3x10 55lb 3x10 65lb 3x10 75lb 3x10 85lb 3x10, LLE 55lb 2x10   Toe walking         HR on leg press  45lb 2x10  L SL 15lb 2x10 L SL 15lb 2x10 L SL 15lb 3x10   Ther Activity         Step ups 6in 1x10 L Edu for home 4 in 2x10 4 in 2x10 4 in 2x10 6 in 2x10 6 in 3x10   Hurdles   3 laps step to 3 laps step to 3 laps step to,2 laps step through 4 laps step through   Step downs     6 in 1x10    Sit to stands     2x10 2x10   Stairs    2 flights with Edu     Gait Training         Wean from CAM boot                  Modalities                              Assessment: Tolerated treatment well. Patient demonstrated fatigue post treatment, exhibited good technique with therapeutic exercises, and would benefit from continued PT. Patient presents to PT for a progress note. Subjectively, patient reports he has noticed improvements in his L ankle mobility, walking tolerance, standing tolerance, and pain levels since beginning PT. Objectively, patient has made improvements in L ankle AROM/PROM, L ankle strength, and single leg balance. Despite these improvements, patient continues to demonstrate deficits in L ankle strength, LLE strength, and static and dynamic balance leading to difficulty with prolonged walking, prolonged standing, and walking without an AD. Patient would benefit from continued PT to address above impairments and achieve remaining therapy goals. Plan: Continue per plan of care. Progress treatment as tolerated.     Willie Sands

## 2023-12-04 ENCOUNTER — OFFICE VISIT (OUTPATIENT)
Dept: PHYSICAL THERAPY | Facility: CLINIC | Age: 18
End: 2023-12-04
Payer: COMMERCIAL

## 2023-12-04 DIAGNOSIS — Q66.89 TARSAL COALITION OF LEFT FOOT: Primary | ICD-10-CM

## 2023-12-04 PROCEDURE — 97530 THERAPEUTIC ACTIVITIES: CPT

## 2023-12-04 PROCEDURE — 97112 NEUROMUSCULAR REEDUCATION: CPT

## 2023-12-04 PROCEDURE — 97110 THERAPEUTIC EXERCISES: CPT

## 2023-12-04 NOTE — PROGRESS NOTES
Daily Note     Today's date: 2023  Patient name: Oscar Carter  : 2005  MRN: [id number]  Referring provider: Shayne Arauz DPM  Dx:   Encounter Diagnosis     ICD-10-CM    1. Tarsal coalition of left foot  Q66.89                   Start Time: 1400  Stop Time: 1445  Total time in clinic (min): 45 minutes    Subjective: Patient reports he was able to stand for 40 minutes yesterday before noticing increased pain with heel toe walking. Reports no pain with heel toe walking with walker and more pain without. Reports he has an MD follow up in January. Objective: See treatment diary below         Precautions: L subtalar fusion on 23     Manuals    L ankle PROM 33 [address] [facility] [address], [address] [facility] [address], 3 [facility] [address], [address] [facility]   L ankle  [address] [facility] [address], 3 [facility] [address], 3 [facility] [address], 3 [facility] [address], 3 [facility]   L TC mobs [address], 3 [facility] [address], 3 [facility] [address], 3 [facility] [address], 3 [facility] [address], [address] [facility]           Neuro Re-Ed        Tandem balance  2x30" ea 2x30" ea On foam 2x30" ea     Step taps 4 in 2x10 4 in 2x10      Foam balance 2x30" 2x30"      SLS 2x30" 2x30" 3x30" 3x30" 2x30", 2x30" on foam   BIODEX     Heel raises with equal WB edu   Weight shifting        Tandem walking    3 laps On foam 3 laps   HS curls standing        SLR        Towel scrunches        Toe yoga        Clam shells        Balance board     2x30" ea   Ther Ex        Bike  5' L0 5' L0 5' L0 5' L1   Ankle circles        Standing gastroc stretch   5x10" 3x20"    Ankle 3-way GRN 2x10 ea GRN 2x10 ea GRN 2x10 ea GRN 2x10 ea GRN 2x10 ea   Heel raises Seated 20# HR  & TR  30x ea  Standing B 2x10 Seated 20# HR  & TR  30x ea; Standing B 3x10 Standing B 3x10 Standing B 3x10 Standing B 3x10   Eccentric heel raises     P!    Leg press 55lb 3x10 65lb 3x10 75lb 3x10 85lb 3x10, LLE 55lb 2x10 95lb 3x10, LLE 65lb 3x10   Toe walking        HR on leg press  L SL 15lb 2x10 L SL 15lb 2x10 L SL 15lb 3x10 L SL 25lb 3x10   Ther Activity        Step ups 4 in 2x10 4 in 2x10 6 in 2x10 6 in 3x10    Hurdles 3 laps step to 3 laps step to 3 laps step to,2 laps step through 4 laps step through Step downs   6 in 1x10     Obstacle course     3 laps - 2 steps, 2 hurdles   Sit to stands   2x10 2x10    Stairs  2 flights with Edu      Gait Training        Wean from CAM boot                Modalities                           Assessment: Tolerated treatment well. Patient demonstrated fatigue post treatment, exhibited good technique with therapeutic exercises, and would benefit from continued PT. Continuing to progress LLE strength and stability overall. Also progressed static and dynamic balance to include foam to address difficulty with walking on uneven surfaces. Plan: Continue per plan of care. Progress treatment as tolerated.     Francisco Capellan

## 2023-12-07 ENCOUNTER — OFFICE VISIT (OUTPATIENT)
Dept: PHYSICAL THERAPY | Facility: CLINIC | Age: 18
End: 2023-12-07
Payer: COMMERCIAL

## 2023-12-07 ENCOUNTER — TELEPHONE (OUTPATIENT)
Dept: PODIATRY | Facility: CLINIC | Age: 18
End: 2023-12-07

## 2023-12-07 DIAGNOSIS — Q66.89 TARSAL COALITION OF LEFT FOOT: Primary | ICD-10-CM

## 2023-12-07 PROCEDURE — 97112 NEUROMUSCULAR REEDUCATION: CPT

## 2023-12-07 PROCEDURE — 97110 THERAPEUTIC EXERCISES: CPT

## 2023-12-07 PROCEDURE — 97530 THERAPEUTIC ACTIVITIES: CPT

## 2023-12-07 NOTE — PROGRESS NOTES
Daily Note     Today's date: 2023  Patient name: Kia Andrade  : 2005  MRN: [id number]  Referring provider: Ashley Salazar DPM  Dx:   Encounter Diagnosis     ICD-10-CM    1. Tarsal coalition of left foot  Q66.89                   Start Time: 1400  Stop Time: 6156  Total time in clinic (min): 45 minutes    Subjective: Patient reports he was able to walk on the treadmill at the gym for 5-10 minutes today with difficulty and mild pain. Reports he has an MD follow up in January. Objective: See treatment diary below         Precautions: L subtalar fusion on 23     Manuals    L ankle PROM 33 57 [facility] [address], [address] [place] [address] [address], [address] [place] [address], [address] [place] [address], 3 [facility]   L ankle  [address] [place] [address], [address] [place] [address], [address] [place] [address] [place] [address], [address] [place] [address], [address] [place]   L TC mobs [address], 3 [facility] [address], [address] [place] [address], [address] [place] [address], [address] [place] [address], [address] [place] [address], [address] [facility]   L ankle DF MWM      5"x10   Neuro Re-Ed         Tandem balance  2x30" ea 2x30" ea On foam 2x30" ea      Step taps 4 in 2x10 4 in 2x10       Foam balance 2x30" 2x30"       SLS 2x30" 2x30" 3x30" 3x30" 2x30", 2x30" on foam 3x30" on foam   BIODEX     Heel raises with equal WB edu LOS x2   Weight shifting         Tandem walking    3 laps On foam 3 laps On foam 4 laps   HS curls standing         SLR         Towel scrunches         Toe yoga         Clam shells         Balance board     2x30" ea 2x30" ea   Ther Ex         Bike  5' L0 5' L0 5' L0 5' L1 5' L3   Ankle circles         Standing gastroc stretch   5x10" 3x20"     Ankle 3-way GRN 2x10 ea GRN 2x10 ea GRN 2x10 ea GRN 2x10 ea GRN 2x10 ea GRN 2x10 ea   Heel raises Seated 20# HR  & TR  30x ea  Standing B 2x10 Seated 20# HR  & TR  30x ea; Standing B 3x10 Standing B 3x10 Standing B 3x10 Standing B 3x10 Standing B 3x12   Eccentric heel raises     P!     Leg press 55lb 3x10 65lb 3x10 75lb 3x10 85lb 3x10, LLE 55lb 2x10 95lb 3x10, LLE 65lb 3x10 LLE 65lb 3x10   Toe walking         HR on leg press  L SL 15lb 2x10 L SL 15lb 2x10 L SL 15lb 3x10 L SL 25lb 3x10 L SL 35lb 3x10   Ther Activity         Step ups 4 in 2x10 4 in 2x10 6 in 2x10 6 in 3x10     Hurdles 3 laps step to 3 laps step to 3 laps step to,2 laps step through 4 laps step through     Step downs   6 in 1x10      Obstacle course     3 laps - 2 steps, 2 hurdles 3 laps - 2 steps, 4 hurdles   Sit to stands   2x10 2x10     Stride weight shift to L heel off      10"x10   Stairs  2 flights with Edu       Gait Training         Wean from CAM boot                  Modalities                              Assessment: Tolerated treatment well. Patient demonstrated fatigue post treatment, exhibited good technique with therapeutic exercises, and would benefit from continued PT. Good tolerance to exercise progressions made this session with a focus on improving LLE strength and stability to improve standing and walking tolerance. Continued challenge with weight bearing through his L forefoot due to pain and weakness. Plan: Continue per plan of care. Progress treatment as tolerated.     William Justice

## 2023-12-07 NOTE — TELEPHONE ENCOUNTER
Caller: Leon Lefort    Doctor: Nikolay Cervantes DPM    Reason for call: Alex Quintanilla received a message from the office asking him to call and change his appointment on 1/8/24 to 9:00 or 9:15. 9:15 is available but I do not have the authority to put him in at that time, as it is a new patient slot. I need the office to call him and change his appointment. He will try to answer but if he is not able to, you can leave him a message with the time.     Call back#: [phone]

## 2023-12-11 ENCOUNTER — OFFICE VISIT (OUTPATIENT)
Dept: PHYSICAL THERAPY | Facility: CLINIC | Age: 18
End: 2023-12-11
Payer: COMMERCIAL

## 2023-12-11 DIAGNOSIS — Q66.89 TARSAL COALITION OF LEFT FOOT: Primary | ICD-10-CM

## 2023-12-11 PROCEDURE — 97110 THERAPEUTIC EXERCISES: CPT

## 2023-12-11 PROCEDURE — 97530 THERAPEUTIC ACTIVITIES: CPT

## 2023-12-11 PROCEDURE — 97112 NEUROMUSCULAR REEDUCATION: CPT

## 2023-12-11 NOTE — PROGRESS NOTES
Daily Note     Today's date: 2023  Patient name: Mallory Nichols  : 2005  MRN: [id number]  Referring provider: Damian Hurley DPM  Dx:   Encounter Diagnosis     ICD-10-CM    1. Tarsal coalition of left foot  Q66.89                     Start Time: 1400  Stop Time: 5504  Total time in clinic (min): 45 minutes    Subjective: Patient reports some pain in his foot today due to being more active on Saturday where he was able to walk for 5 min on the treadmill then went grocery shopping. Reports "stiffness" and lateral foot pain after prolonged walking and standing. Reports he has an MD follow up on 23.     Objective: See treatment diary below         Precautions: L subtalar fusion on 23     Manuals    L ankle PROM 33 57 [facility] [address], [address] [place] [address], [address] [place] [address], [address] [place] [address], [address] [place] [address] [facility]   L ankle  [address] [place] [address] [facility] [address] [place] [address] [place] [address] [place] [address] [place] [address] [facility]   L TC mobs [address], [address] [facility] [address], 3 [facility] [address], 3 [facility] [address], 3 [facility] [address], 3 [facility] [address], [address] [place] [address], 3 [facility]   L ankle DF MWM      5"x10    Neuro Re-Ed          Tandem balance  2x30" ea 2x30" ea On foam 2x30" ea       Step taps 4 in 2x10 4 in 2x10        Foam balance 2x30" 2x30"        SLS 2x30" 2x30" 3x30" 3x30" 2x30", 2x30" on foam 3x30" on foam 3x30" on foam blue   BIODEX     Heel raises with equal WB edu LOS x2    Weight shifting          Tandem walking    3 laps On foam 3 laps On foam 4 laps On foam 4 laps   HS curls standing          SLR          Towel scrunches          Toe yoga          Clam shells          Balance board     2x30" ea 2x30" ea 2x30" ea   Ther Ex          Bike  5' L0 5' L0 5' L0 5' L1 5' L3 5' L4   Ankle circles          Standing gastroc stretch   5x10" 3x20"      Ankle 3-way GRN 2x10 ea GRN 2x10 ea GRN 2x10 ea GRN 2x10 ea GRN 2x10 ea GRN 2x10 ea GRN 2x10 ea   Heel raises Seated 20# HR  & TR  30x ea  Standing B 2x10 Seated 20# HR  & TR  30x ea; Standing B 3x10 Standing B 3x10 Standing B 3x10 Standing B 3x10 Standing B 3x12 B 2x10 5" hold   Eccentric heel raises     P!  1x10 on foam   Leg press 55lb 3x10 65lb 3x10 75lb 3x10 85lb 3x10, LLE 55lb 2x10 95lb 3x10, LLE 65lb 3x10 LLE 65lb 3x10 LLE 75lb 3x10   Toe walking          HR on leg press  L SL 15lb 2x10 L SL 15lb 2x10 L SL 15lb 3x10 L SL 25lb 3x10 L SL 35lb 3x10 L SL 45lb 3x10   Ther Activity          Step ups 4 in 2x10 4 in 2x10 6 in 2x10 6 in 3x10      Hurdles 3 laps step to 3 laps step to 3 laps step to,2 laps step through 4 laps step through      Step downs   6 in 1x10    4 in up and down 1x15   Obstacle course     3 laps - 2 steps, 2 hurdles 3 laps - 2 steps, 4 hurdles    Sit to stands   2x10 2x10      Stride weight shift to L heel off      10"x10 10"x10   Lunges       P! Stairs  2 flights with Edu        Gait Training          Wean from CAM boot                    Modalities                                 Assessment: Tolerated treatment well. Patient demonstrated fatigue post treatment, exhibited good technique with therapeutic exercises, and would benefit from continued PT. Able to perform eccentric L heel raises on foam this session but unable to perform on a firm surface. Discussed foam insert for his shoes to assist with increased pain on harder surfaces. Plan to further discuss next visit. Also continuing to progress L ankle strength and stability. Plan: Continue per plan of care. Progress treatment as tolerated.     Kishan Samuel

## 2023-12-14 ENCOUNTER — OFFICE VISIT (OUTPATIENT)
Dept: PHYSICAL THERAPY | Facility: CLINIC | Age: 18
End: 2023-12-14
Payer: COMMERCIAL

## 2023-12-14 DIAGNOSIS — Q66.89 TARSAL COALITION OF LEFT FOOT: Primary | ICD-10-CM

## 2023-12-14 PROCEDURE — 97110 THERAPEUTIC EXERCISES: CPT

## 2023-12-14 PROCEDURE — 97530 THERAPEUTIC ACTIVITIES: CPT

## 2023-12-14 PROCEDURE — 97112 NEUROMUSCULAR REEDUCATION: CPT

## 2023-12-14 NOTE — PROGRESS NOTES
Daily Note     Today's date: 2023  Patient name: Lew Resendiz  : 2005  MRN: [id number]  Referring provider: Jono Fulton DPM  Dx:   Encounter Diagnosis     ICD-10-CM    1. Tarsal coalition of left foot  Q66.89                       Start Time: 1400  Stop Time: 1445  Total time in clinic (min): 45 minutes    Subjective: Patient reports he has been able to stand for 1 hour now while playing piano but has soreness afterwards. Reports most challenge with walking outside and on harder surfaces. Reports muscle shaking and decreased balance after high activity levels and fatigue requiring RW use. Reports he can walk without the walker when not having pain or fatigue. Reports he would like to practice walking outside as this is most difficult for him. Reports he has an MD follow up on 23.     Objective: See treatment diary below         Precautions: L subtalar fusion on 23     Manuals    L ankle PROM 33 57 [facility] [address], 3 [facility] [address], 3 [facility] [address], 3 [facility] [address] [facility] [address] [facility] [address] [place]   L ankle  [address] [facility] [address] [facility] [address] [facility] [address] [facility] [address] [facility] [address] [facility] [address] [facility] [address] [place]   L TC mobs [address] [facility] [address] [facility] [address] [facility] [address] [facility] [address], 3 [facility] [address], 3 [facility] [address], 3 [facility] [address], [address] [place]   L ankle DF MWM      5"x10     Neuro Re-Ed           Tandem balance  2x30" ea 2x30" ea On foam 2x30" ea        Step taps 4 in 2x10 4 in 2x10         Foam balance 2x30" 2x30"         SLS 2x30" 2x30" 3x30" 3x30" 2x30", 2x30" on foam 3x30" on foam 3x30" on foam blue 3x30" on foam blue   BIODEX     Heel raises with equal WB edu LOS x2     Weight shifting           Tandem walking    3 laps On foam 3 laps On foam 4 laps On foam 4 laps 4 laps   Wall sits        5x10"   SLR           Towel scrunches           Toe yoga           Soleus heel raises        nv   Balance board     2x30" ea 2x30" ea 2x30" ea 2x30" ea   Ther Ex           Bike  5' L0 5' L0 5' L0 5' L1 5' L3 5' L4 5' L4   Ankle circles           Standing gastroc stretch   5x10" 3x20"       Ankle 3-way GRN 2x10 ea GRN 2x10 ea GRN 2x10 ea GRN 2x10 ea GRN 2x10 ea GRN 2x10 ea GRN 2x10 ea    Heel raises Seated 20# HR  & TR  30x ea  Standing B 2x10 Seated 20# HR  & TR  30x ea; Standing B 3x10 Standing B 3x10 Standing B 3x10 Standing B 3x10 Standing B 3x12 B 2x10 5" hold B 2x10 5" hold   Eccentric heel raises     P!  1x10 on foam 2x10 on foam   Leg press 55lb 3x10 65lb 3x10 75lb 3x10 85lb 3x10, LLE 55lb 2x10 95lb 3x10, LLE 65lb 3x10 LLE 65lb 3x10 LLE 75lb 3x10 LLE 85lb 3x10   Toe walking        On foam, P! HR on leg press  L SL 15lb 2x10 L SL 15lb 2x10 L SL 15lb 3x10 L SL 25lb 3x10 L SL 35lb 3x10 L SL 45lb 3x10 L SL 55lb 3x10   Ther Activity           Step ups 4 in 2x10 4 in 2x10 6 in 2x10 6 in 3x10       Hurdles 3 laps step to 3 laps step to 3 laps step to,2 laps step through 4 laps step through       Step downs   6 in 1x10    4 in up and down 1x15    Obstacle course     3 laps - 2 steps, 2 hurdles 3 laps - 2 steps, 4 hurdles     Sit to stands   2x10 2x10    Staggered L behind 1x15   Stride weight shift to L heel off      10"x10 10"x10 10"x10   Lunges       P! Stairs  2 flights with Edu         Gait Training           Wean from CAM boot           Outside / hard uneven surface           Modalities                                    Assessment: Tolerated treatment well. Patient demonstrated fatigue post treatment, exhibited good technique with therapeutic exercises, and would benefit from continued PT. Most muscle fatigue and shaking noted with eccentric heel raises due to L calf weakness and at the end of the session. Decreased LLE muscular endurance noted evident by muscle shaking following strengthening exercises which leads to decreased gait stability. Discussed foam insert for his shoes to help with outdoor walking and walking on harder surfaces. Plan: Continue per plan of care. Progress treatment as tolerated.     Fabrizio Pink

## 2023-12-18 ENCOUNTER — OFFICE VISIT (OUTPATIENT)
Dept: PHYSICAL THERAPY | Facility: CLINIC | Age: 18
End: 2023-12-18
Payer: COMMERCIAL

## 2023-12-18 DIAGNOSIS — Q66.89 TARSAL COALITION OF LEFT FOOT: Primary | ICD-10-CM

## 2023-12-18 PROCEDURE — 97112 NEUROMUSCULAR REEDUCATION: CPT

## 2023-12-18 PROCEDURE — 97110 THERAPEUTIC EXERCISES: CPT

## 2023-12-18 PROCEDURE — 97140 MANUAL THERAPY 1/> REGIONS: CPT

## 2023-12-18 NOTE — PROGRESS NOTES
"Daily Note     Today's date: 2023  Patient name: Mukund Sam  : 2005  MRN: [id number]  Referring provider: Geovanny Soni DPM  Dx:   Encounter Diagnosis     ICD-10-CM    1. Tarsal coalition of left foot  Q66.89                         Start Time: 1400  Stop Time: 1445  Total time in clinic (min): 45 minutes    Subjective: Patient reports got foam shoe inserts to help with walking on harder surfaces which seems to help a little. Reports he would like to practice walking outside. Reports he has an MD follow up on 23.    Objective: See treatment diary below         Precautions: L subtalar fusion on 23     Manuals    L ankle PROM MK MK MK MK MK MK MK MK MK   L ankle STM MK MK MK MK MK MK MK MK MK + [place] tail gastroc   L TC mobs MK MK MK MK MK MK MK MK MK   L ankle DF MWM      5\"x10   5\"x10   Neuro Re-Ed            Tandem balance  2x30\" ea 2x30\" ea On foam 2x30\" ea         Step taps 4 in 2x10 4 in 2x10          Foam balance 2x30\" 2x30\"          SLS 2x30\" 2x30\" 3x30\" 3x30\" 2x30\", 2x30\" on foam 3x30\" on foam 3x30\" on foam blue 3x30\" on foam blue 3x30\" on foam blue   BIODEX     Heel raises with equal WB edu LOS x2      Weight shifting            Tandem walking    3 laps On foam 3 laps On foam 4 laps On foam 4 laps 4 laps    Wall sits        5x10\"    SLR            Towel scrunches            Toe yoga            Soleus heel raises        nv 4x5   Balance board     2x30\" ea 2x30\" ea 2x30\" ea 2x30\" ea 2x1' ea   Ther Ex            Bike  5' L0 5' L0 5' L0 5' L1 5' L3 5' L4 5' L4 5' L6   Ankle circles            Standing gastroc stretch   5x10\" 3x20\"        Ankle 3-way GRN 2x10 ea GRN 2x10 ea GRN 2x10 ea GRN 2x10 ea GRN 2x10 ea GRN 2x10 ea GRN 2x10 ea     Heel raises Seated 20# HR  & TR  30x ea  Standing B 2x10 Seated 20# HR  & TR  30x ea; Standing B 3x10 Standing B 3x10 Standing B 3x10 Standing B 3x10 Standing B 3x12 B 2x10 5\" hold B 2x10 5\" " "hold B 2x10 5\" hold   Eccentric heel raises     P!  1x10 on foam 2x10 on foam 2x10 on foam   Leg press 55lb 3x10 65lb 3x10 75lb 3x10 85lb 3x10, LLE 55lb 2x10 95lb 3x10, LLE 65lb 3x10 LLE 65lb 3x10 LLE 75lb 3x10 LLE 85lb 3x10 LLE 95lb 3x10   Toe walking        On foam, P!    HR on leg press  L SL 15lb 2x10 L SL 15lb 2x10 L SL 15lb 3x10 L SL 25lb 3x10 L SL 35lb 3x10 L SL 45lb 3x10 L SL 55lb 3x10 L SL 65lb 3x10   Ther Activity            Step ups 4 in 2x10 4 in 2x10 6 in 2x10 6 in 3x10        Hurdles 3 laps step to 3 laps step to 3 laps step to,2 laps step through 4 laps step through        Step downs   6 in 1x10    4 in up and down 1x15     Obstacle course     3 laps - 2 steps, 2 hurdles 3 laps - 2 steps, 4 hurdles      Sit to stands   2x10 2x10    Staggered L behind 1x15    Stride weight shift to L heel off      10\"x10 10\"x10 10\"x10    Lunges       P!     Stairs  2 flights with Edu          Gait Training            Wean from CAM boot            Outside / hard uneven surface         4 laps   Modalities                                       Assessment: Tolerated treatment well. Patient demonstrated fatigue post treatment, exhibited good technique with therapeutic exercises, and would benefit from continued PT. Good stability and minimal pain noted with outdoor walking on hard surface pre-session with decreased stability noted post-session following muscle fatigue. Progressed soleus strengthening this session with appropriate challenge noted.        Plan: Continue per plan of care.  Progress treatment as tolerated.    Lavern Morin  "

## 2023-12-21 ENCOUNTER — OFFICE VISIT (OUTPATIENT)
Dept: PHYSICAL THERAPY | Facility: CLINIC | Age: 18
End: 2023-12-21
Payer: COMMERCIAL

## 2023-12-21 DIAGNOSIS — Q66.89 TARSAL COALITION OF LEFT FOOT: Primary | ICD-10-CM

## 2023-12-21 PROCEDURE — 97110 THERAPEUTIC EXERCISES: CPT

## 2023-12-21 PROCEDURE — 97112 NEUROMUSCULAR REEDUCATION: CPT

## 2023-12-21 PROCEDURE — 97140 MANUAL THERAPY 1/> REGIONS: CPT
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
## 2023-12-26 ENCOUNTER — OFFICE VISIT (OUTPATIENT)
Dept: PHYSICAL THERAPY | Facility: CLINIC | Age: 18
End: 2023-12-26
Payer: COMMERCIAL

## 2023-12-26 DIAGNOSIS — Q66.89 TARSAL COALITION OF LEFT FOOT: Primary | ICD-10-CM

## 2023-12-26 PROCEDURE — 97112 NEUROMUSCULAR REEDUCATION: CPT

## 2023-12-26 PROCEDURE — 97140 MANUAL THERAPY 1/> REGIONS: CPT

## 2023-12-26 PROCEDURE — 97110 THERAPEUTIC EXERCISES: CPT

## 2023-12-26 NOTE — PROGRESS NOTES
"Daily Note     Today's date: 2023  Patient name: Mukund Sam  : 2005  MRN: [id number]  Referring provider: Geovanny Soni DPM  Dx:   Encounter Diagnosis     ICD-10-CM    1. Tarsal coalition of left foot  Q66.89                 Start Time: 1400  Stop Time: 1445  Total time in clinic (min): 45 minutes    Subjective: Patient reports he has been walking with a cane the past 2 days which is going well but slightly more challenging with his balance. Reports most difficulty with descending stairs, pushing off with his L forefoot, and walking longer distances. Reports he has an MD follow up on 23.    Objective: See treatment diary below         Precautions: L subtalar fusion on 23     Manuals    L ankle PROM MK MK MK MK MK MK MK   L ankle STM MK MK MK MK MK + [place] tail gastroc MK MK   L TC mobs MK MK MK MK MK MK MK   L ankle DF MWM  5\"x10   5\"x10     Neuro Re-Ed          Tandem balance           Step taps          L ankle eversion/inversion isometrics      5\"x10 10\"x10   SLS 2x30\", 2x30\" on foam 3x30\" on foam 3x30\" on foam blue 3x30\" on foam blue 3x30\" on foam blue 3x30\" on foam blk 3x30\" on foam blk   BIODEX Heel raises with equal WB edu LOS x2        Weight shifting          Tandem walking On foam 3 laps On foam 4 laps On foam 4 laps 4 laps      Wall sits    5x10\"      Eccentric lateral step tap       nv   Towel scrunches          Toe yoga          Soleus heel raises    nv 4x5 5x5 5x5   Balance board 2x30\" ea 2x30\" ea 2x30\" ea 2x30\" ea 2x1' ea 2x1' ea    Ther Ex          Bike 5' L1 5' L3 5' L4 5' L4 5' L6 5' L6    TM       5 min   Ankle circles          Standing gastroc stretch          Ankle 3-way GRN 2x10 ea GRN 2x10 ea GRN 2x10 ea       Heel raises Standing B 3x10 Standing B 3x12 B 2x10 5\" hold B 2x10 5\" hold B 2x10 5\" hold B 2x10 10\" hold B 2x10 10\" hold   Eccentric heel raises P!  1x10 on foam 2x10 on foam 2x10 on foam 2x10 on foam 2x10 on " "foam   Leg press 95lb 3x10, LLE 65lb 3x10 LLE 65lb 3x10 LLE 75lb 3x10 LLE 85lb 3x10 LLE 95lb 3x10 LLE 105lb 3x10 LLE 115lb 3x10   Toe walking    On foam, P!      HR on leg press L SL 25lb 3x10 L SL 35lb 3x10 L SL 45lb 3x10 L SL 55lb 3x10 L SL 65lb 3x10 L SL 55lb 3x10 L SL 65lb 2x10   Ther Activity          Step ups          Hurdles          Step downs   4 in up and down 1x15       Obstacle course 3 laps - 2 steps, 2 hurdles 3 laps - 2 steps, 4 hurdles        Sit to stands    Staggered L behind 1x15      Stride weight shift to L heel off  10\"x10 10\"x10 10\"x10      Lunges   P!       Stairs          Gait Training          SPC       5 min   Outside / hard uneven surface     4 laps 4 laps post    Modalities                                 Assessment: Tolerated treatment well. Patient demonstrated fatigue post treatment, exhibited good technique with therapeutic exercises, and would benefit from continued PT. Increased walking tolerance noted with TM walking pre-session however this lead to increased L ankle fatigue and discomfort during remaining exercises demonstrating the need further endurance training. Muscle fatigue noted with isometrics demonstrating the need for further L ankle stability. Education provided about proper SPC use with improved stability noted following.        Plan: Continue per plan of care.  Progress treatment as tolerated.    Lavern Morin  "

## 2023-12-28 ENCOUNTER — OFFICE VISIT (OUTPATIENT)
Dept: PHYSICAL THERAPY | Facility: CLINIC | Age: 18
End: 2023-12-28
Payer: COMMERCIAL

## 2023-12-28 DIAGNOSIS — Q66.89 TARSAL COALITION OF LEFT FOOT: Primary | ICD-10-CM

## 2023-12-28 PROCEDURE — 97140 MANUAL THERAPY 1/> REGIONS: CPT

## 2023-12-28 PROCEDURE — 97112 NEUROMUSCULAR REEDUCATION: CPT

## 2023-12-28 PROCEDURE — 97110 THERAPEUTIC EXERCISES: CPT

## 2023-12-28 NOTE — PROGRESS NOTES
"Daily Note     Today's date: 2023  Patient name: Mukund Sam  : 2005  MRN: [id number]  Referring provider: Geovanny Soni DPM  Dx:   Encounter Diagnosis     ICD-10-CM    1. Tarsal coalition of left foot  Q66.89                   Start Time: 1400  Stop Time: 1445  Total time in clinic (min): 45 minutes    Subjective: Patient reports he has been going to the gym and using the treadmill more however continues to have lateral ankle pain with pushing off his toes when walking. Reports he has an MD follow up on 23.    Objective: See treatment diary below         Precautions: L subtalar fusion on 23     Manuals    L ankle PROM MK MK MK MK MK MK MK MK   L ankle STM MK MK MK MK MK + [place] tail gastroc MK MK MK   L TC mobs MK MK MK MK MK MK MK MK   L ankle DF MWM  5\"x10   5\"x10   5\"x10   Neuro Re-Ed           Tandem balance            Step taps           L ankle eversion/inversion isometrics      5\"x10 10\"x10 5\"x10   SLS 2x30\", 2x30\" on foam 3x30\" on foam 3x30\" on foam blue 3x30\" on foam blue 3x30\" on foam blue 3x30\" on foam blk 3x30\" on foam blk 3x30\" on foam blk   BIODEX Heel raises with equal WB edu LOS x2         Weight shifting           Tandem walking On foam 3 laps On foam 4 laps On foam 4 laps 4 laps       Wall sits    5x10\"       Eccentric lateral step tap       nv 6 in 2x10   Towel scrunches           Toe yoga           Soleus heel raises    nv 4x5 5x5 5x5 5x6   Balance board 2x30\" ea 2x30\" ea 2x30\" ea 2x30\" ea 2x1' ea 2x1' ea     Ther Ex           Bike 5' L1 5' L3 5' L4 5' L4 5' L6 5' L6     TM       5 min 5 min   Standing soleus stretch        Trial p!   Standing gastroc stretch           Pistol squat        Trial    Ankle 3-way GRN 2x10 ea GRN 2x10 ea GRN 2x10 ea        Heel raises Standing B 3x10 Standing B 3x12 B 2x10 5\" hold B 2x10 5\" hold B 2x10 5\" hold B 2x10 10\" hold B 2x10 10\" hold B 2x10 10\" hold   Eccentric heel raises P!  " "1x10 on foam 2x10 on foam 2x10 on foam 2x10 on foam 2x10 on foam 2x10 on foam   Leg press 95lb 3x10, LLE 65lb 3x10 LLE 65lb 3x10 LLE 75lb 3x10 LLE 85lb 3x10 LLE 95lb 3x10 LLE 105lb 3x10 LLE 115lb 3x10 LLE 115lb 3x10   Toe walking    On foam, P!       HR on leg press L SL 25lb 3x10 L SL 35lb 3x10 L SL 45lb 3x10 L SL 55lb 3x10 L SL 65lb 3x10 L SL 55lb 3x10 L SL 65lb 2x10 L SL 65lb 2x10   Ther Activity           Step ups           Hurdles           Step downs   4 in up and down 1x15        Obstacle course 3 laps - 2 steps, 2 hurdles 3 laps - 2 steps, 4 hurdles         Sit to stands    Staggered L behind 1x15    Staggered L behind 2x10   Stride weight shift to L heel off  10\"x10 10\"x10 10\"x10       Lunges   P!     On step nv   Stairs           Gait Training           SPC       5 min    Outside / hard uneven surface     4 laps 4 laps post     Modalities                                    Assessment: Tolerated treatment well. Patient demonstrated fatigue post treatment, exhibited good technique with therapeutic exercises, and would benefit from continued PT. Continuing to demonstrate improvements in L ankle stability and walking tolerance overall. Trialed soleus stretch with lateral and posterior ankle pain noted, slight relief following DF MWM indicating likely limited AP talocrural joint mobility, so plan to focus on this next visit. Trialed pistol squats but too challenging at this time, plan to re-add in future sessions.         Plan: Continue per plan of care.  Progress treatment as tolerated.    Lavern Morin  "

## 2024-01-02 ENCOUNTER — OFFICE VISIT (OUTPATIENT)
Dept: PHYSICAL THERAPY | Facility: CLINIC | Age: 19
End: 2024-01-02
Payer: COMMERCIAL

## 2024-01-02 DIAGNOSIS — Q66.89 TARSAL COALITION OF LEFT FOOT: Primary | ICD-10-CM

## 2024-01-02 PROCEDURE — 97112 NEUROMUSCULAR REEDUCATION: CPT

## 2024-01-02 PROCEDURE — 97110 THERAPEUTIC EXERCISES: CPT

## 2024-01-02 PROCEDURE — 97140 MANUAL THERAPY 1/> REGIONS: CPT

## 2024-01-02 NOTE — PROGRESS NOTES
"Daily Note     Today's date: 2024  Patient name: Mukund aSm  : 2005  MRN: [id number]  Referring provider: Geovanny Soni DPM  Dx:   Encounter Diagnosis     ICD-10-CM    1. Tarsal coalition of left foot  Q66.89                     Start Time: 1400  Stop Time: 1445  Total time in clinic (min): 45 minutes    Subjective: Patient reports he has continued pain with walking \"heel-toe\" and notes pain with every step. Reports he has been doing all his normal ADLs now since returning to the gym but continues to have pain. Reports using the cane helps with his pain when walking. Reports he has an MD follow up on 23.    Objective: See treatment diary below         Precautions: L subtalar fusion on 23     Manuals    L ankle PROM MK MK MK MK MK MK MK MK MK   L ankle STM MK MK MK MK MK + [place] tail gastroc MK MK MK MK   L TC mobs MK MK MK MK MK MK MK MK MK   L ankle DF MWM  5\"x10   5\"x10   5\"x10 5\"x10 pink band   Neuro Re-Ed            Tandem balance             Step taps            L ankle eversion/inversion isometrics      5\"x10 10\"x10 5\"x10 5\"x10   SLS 2x30\", 2x30\" on foam 3x30\" on foam 3x30\" on foam blue 3x30\" on foam blue 3x30\" on foam blue 3x30\" on foam blk 3x30\" on foam blk 3x30\" on foam blk    BIODEX Heel raises with equal WB edu LOS x2          Weight shifting            Tandem walking On foam 3 laps On foam 4 laps On foam 4 laps 4 laps        Wall sits    5x10\"        Eccentric lateral step tap       nv 6 in 2x10 4in 3x10   Towel scrunches            Toe yoga            Soleus heel raises    nv 4x5 5x5 5x5 5x6 5x6   Balance board 2x30\" ea 2x30\" ea 2x30\" ea 2x30\" ea 2x1' ea 2x1' ea      Ther Ex            Bike 5' L1 5' L3 5' L4 5' L4 5' L6 5' L6      TM       5 min 5 min 5 min   Standing soleus stretch        Trial p!    Standing gastroc stretch         Prostretch 3x20\"   Pistol squat        Trial     Ankle 3-way GRN 2x10 ea GRN 2x10 ea " "GRN 2x10 ea         Heel raises Standing B 3x10 Standing B 3x12 B 2x10 5\" hold B 2x10 5\" hold B 2x10 5\" hold B 2x10 10\" hold B 2x10 10\" hold B 2x10 10\" hold B 2x10 10\" hold   Eccentric heel raises P!  1x10 on foam 2x10 on foam 2x10 on foam 2x10 on foam 2x10 on foam 2x10 on foam    Leg press 95lb 3x10, LLE 65lb 3x10 LLE 65lb 3x10 LLE 75lb 3x10 LLE 85lb 3x10 LLE 95lb 3x10 LLE 105lb 3x10 LLE 115lb 3x10 LLE 115lb 3x10 LLE 125lb 3x10   Toe walking    On foam, P!        HR on leg press L SL 25lb 3x10 L SL 35lb 3x10 L SL 45lb 3x10 L SL 55lb 3x10 L SL 65lb 3x10 L SL 55lb 3x10 L SL 65lb 2x10 L SL 65lb 2x10 L SL 75lb 3x10   Ther Activity            Step ups            Hurdles            Step downs   4 in up and down 1x15         Obstacle course 3 laps - 2 steps, 2 hurdles 3 laps - 2 steps, 4 hurdles          Sit to stands    Staggered L behind 1x15    Staggered L behind 2x10 Staggered L behind 3x10   Stride weight shift to L heel off  10\"x10 10\"x10 10\"x10        Lunges   P!     On step nv With mob   Stairs            Gait Training            SPC       5 min     Outside / hard uneven surface     4 laps 4 laps post      Modalities                                       Assessment: Tolerated treatment well. Patient demonstrated fatigue post treatment, exhibited good technique with therapeutic exercises, and would benefit from continued PT. Focused on improving AP talocrural mobility with improvements noted following mobs. Continued fatigue and muscle shaking noted with LLE strengthening exercises demonstrating the need for further strength and endurance training.        Plan: Continue per plan of care.  Progress treatment as tolerated.    Lavern Morin  "

## 2024-01-04 ENCOUNTER — OFFICE VISIT (OUTPATIENT)
Dept: PHYSICAL THERAPY | Facility: CLINIC | Age: 19
End: 2024-01-04
Payer: COMMERCIAL

## 2024-01-04 DIAGNOSIS — Q66.89 TARSAL COALITION OF LEFT FOOT: Primary | ICD-10-CM

## 2024-01-04 PROCEDURE — 97112 NEUROMUSCULAR REEDUCATION: CPT

## 2024-01-04 PROCEDURE — 97110 THERAPEUTIC EXERCISES: CPT

## 2024-01-04 PROCEDURE — 97140 MANUAL THERAPY 1/> REGIONS: CPT

## 2024-01-04 NOTE — PROGRESS NOTES
"Daily Note     Today's date: 2024  Patient name: Mukund Sam  : 2005  MRN: [id number]  Referring provider: Geovanny Soni DPM  Dx:   Encounter Diagnosis     ICD-10-CM    1. Tarsal coalition of left foot  Q66.89             Start Time: 1400  Stop Time: 1445  Total time in clinic (min): 45 minutes    Subjective: Patient reports 3/10 pain today in his L ankle when walking but more pain after prolonged activity. Reports he was doing more at the gym yesterday. Reports he has an MD follow up on 23.    Objective: See treatment diary below         Precautions: L subtalar fusion on 23     Manuals    L ankle PROM MK MK MK MK MK MK MK MK MK MK and toe ext   L ankle STM MK MK MK MK MK + [place] tail gastroc MK MK MK MK MK   L TC mobs MK MK MK MK MK MK MK MK MK MK and fibular AP   L ankle DF MWM  5\"x10   5\"x10   5\"x10 5\"x10 pink band 10\"x10 pink band   Neuro Re-Ed             Tandem balance              Step taps             L ankle eversion/inversion isometrics      5\"x10 10\"x10 5\"x10 5\"x10 5\"x10   SLS 2x30\", 2x30\" on foam 3x30\" on foam 3x30\" on foam blue 3x30\" on foam blue 3x30\" on foam blue 3x30\" on foam blk 3x30\" on foam blk 3x30\" on foam blk     BIODEX Heel raises with equal WB edu LOS x2           Weight shifting             Tandem walking On foam 3 laps On foam 4 laps On foam 4 laps 4 laps         Wall sits    5x10\"         Eccentric lateral step tap       nv 6 in 2x10 4in 3x10    Towel scrunches             Toe yoga             Soleus heel raises    nv 4x5 5x5 5x5 5x6 5x6 5x6   Balance board 2x30\" ea 2x30\" ea 2x30\" ea 2x30\" ea 2x1' ea 2x1' ea       Ther Ex             Bike 5' L1 5' L3 5' L4 5' L4 5' L6 5' L6       TM       5 min 5 min 5 min 5 min   Standing soleus stretch        Trial p!  3x20\"   Standing gastroc stretch         Prostretch 3x20\"    Pistol squat        Trial      Ankle 3-way GRN 2x10 ea GRN 2x10 ea GRN 2x10 ea        " "  Heel raises Standing B 3x10 Standing B 3x12 B 2x10 5\" hold B 2x10 5\" hold B 2x10 5\" hold B 2x10 10\" hold B 2x10 10\" hold B 2x10 10\" hold B 2x10 10\" hold B 2x10 10\" hold   Eccentric heel raises P!  1x10 on foam 2x10 on foam 2x10 on foam 2x10 on foam 2x10 on foam 2x10 on foam     Leg press 95lb 3x10, LLE 65lb 3x10 LLE 65lb 3x10 LLE 75lb 3x10 LLE 85lb 3x10 LLE 95lb 3x10 LLE 105lb 3x10 LLE 115lb 3x10 LLE 115lb 3x10 LLE 125lb 3x10 LLE 135lb 3x10   Toe walking    On foam, P!         HR on leg press L SL 25lb 3x10 L SL 35lb 3x10 L SL 45lb 3x10 L SL 55lb 3x10 L SL 65lb 3x10 L SL 55lb 3x10 L SL 65lb 2x10 L SL 65lb 2x10 L SL 75lb 3x10 L SL 75lb 3x10   Ther Activity             Step ups             Hurdles             Step downs   4 in up and down 1x15          Obstacle course 3 laps - 2 steps, 2 hurdles 3 laps - 2 steps, 4 hurdles           Sit to stands    Staggered L behind 1x15    Staggered L behind 2x10 Staggered L behind 3x10 Staggered L behind 3x10   Stride weight shift to L heel off  10\"x10 10\"x10 10\"x10         Lunges   P!     On step nv With mob 1x10 ea   Stairs             Gait Training             SPC       5 min      Outside / hard uneven surface     4 laps 4 laps post       Modalities                                          Assessment: Tolerated treatment well. Patient demonstrated fatigue post treatment, exhibited good technique with therapeutic exercises, and would benefit from continued PT. Improved L ankle DF and AP mobility following mobs with posterior ankle stiffness noted. Continues to demonstrate decreased L ankle stability after exercises due to fatigue and weakness. Notes increased pain with walking as well when fatigued. Difficulty with lunging with LLE in back due to limited ankle mobility and strength.        Plan: Continue per plan of care.  Progress treatment as tolerated.    Lavern Morin  "

## 2024-01-06 ENCOUNTER — APPOINTMENT (OUTPATIENT)
Dept: LAB | Facility: MEDICAL CENTER | Age: 19
End: 2024-01-06
Payer: COMMERCIAL

## 2024-01-06 DIAGNOSIS — E55.9 VITAMIN D DEFICIENCY: ICD-10-CM

## 2024-01-06 DIAGNOSIS — Z13.220 SCREENING FOR LIPID DISORDERS: ICD-10-CM

## 2024-01-06 DIAGNOSIS — I10 PRIMARY HYPERTENSION: ICD-10-CM

## 2024-01-06 LAB
25(OH)D3 SERPL-MCNC: 29.8 NG/ML (ref 30–100)
ALBUMIN SERPL BCP-MCNC: 4.6 G/DL (ref 3.5–5)
ALP SERPL-CCNC: 65 U/L (ref 34–104)
ALT SERPL W P-5'-P-CCNC: 25 U/L (ref 7–52)
ANION GAP SERPL CALCULATED.3IONS-SCNC: 13 MMOL/L
AST SERPL W P-5'-P-CCNC: 25 U/L (ref 13–39)
BASOPHILS # BLD AUTO: 0.05 THOUSANDS/ÂΜL (ref 0–0.1)
BASOPHILS NFR BLD AUTO: 1 % (ref 0–1)
BILIRUB SERPL-MCNC: 0.41 MG/DL (ref 0.2–1)
BUN SERPL-MCNC: 20 MG/DL (ref 5–25)
CALCIUM SERPL-MCNC: 10.1 MG/DL (ref 8.4–10.2)
CHLORIDE SERPL-SCNC: 102 MMOL/L (ref 96–108)
CHOLEST SERPL-MCNC: 156 MG/DL
CO2 SERPL-SCNC: 25 MMOL/L (ref 21–32)
CREAT SERPL-MCNC: 0.87 MG/DL (ref 0.6–1.3)
EOSINOPHIL # BLD AUTO: 0.09 THOUSAND/ÂΜL (ref 0–0.61)
EOSINOPHIL NFR BLD AUTO: 1 % (ref 0–6)
ERYTHROCYTE [DISTWIDTH] IN BLOOD BY AUTOMATED COUNT: 13.3 % (ref 11.6–15.1)
GFR SERPL CREATININE-BSD FRML MDRD: 124 ML/MIN/1.73SQ M
GLUCOSE P FAST SERPL-MCNC: 82 MG/DL (ref 65–99)
HCT VFR BLD AUTO: 50.3 % (ref 36.5–49.3)
HDLC SERPL-MCNC: 50 MG/DL
HGB BLD-MCNC: 16.4 G/DL (ref 12–17)
IMM GRANULOCYTES # BLD AUTO: 0.02 THOUSAND/UL (ref 0–0.2)
IMM GRANULOCYTES NFR BLD AUTO: 0 % (ref 0–2)
LDLC SERPL CALC-MCNC: 92 MG/DL (ref 0–100)
LYMPHOCYTES # BLD AUTO: 3.29 THOUSANDS/ÂΜL (ref 0.6–4.47)
LYMPHOCYTES NFR BLD AUTO: 52 % (ref 14–44)
MCH RBC QN AUTO: 28.6 PG (ref 26.8–34.3)
MCHC RBC AUTO-ENTMCNC: 32.6 G/DL (ref 31.4–37.4)
MCV RBC AUTO: 88 FL (ref 82–98)
MONOCYTES # BLD AUTO: 0.4 THOUSAND/ÂΜL (ref 0.17–1.22)
MONOCYTES NFR BLD AUTO: 6 % (ref 4–12)
NEUTROPHILS # BLD AUTO: 2.55 THOUSANDS/ÂΜL (ref 1.85–7.62)
NEUTS SEG NFR BLD AUTO: 40 % (ref 43–75)
NRBC BLD AUTO-RTO: 0 /100 WBCS
PLATELET # BLD AUTO: 271 THOUSANDS/UL (ref 149–390)
PMV BLD AUTO: 10.3 FL (ref 8.9–12.7)
POTASSIUM SERPL-SCNC: 4.5 MMOL/L (ref 3.5–5.3)
PROT SERPL-MCNC: 7.6 G/DL (ref 6.4–8.4)
RBC # BLD AUTO: 5.73 MILLION/UL (ref 3.88–5.62)
SODIUM SERPL-SCNC: 140 MMOL/L (ref 135–147)
TRIGL SERPL-MCNC: 72 MG/DL
WBC # BLD AUTO: 6.4 THOUSAND/UL (ref 4.31–10.16)

## 2024-01-06 PROCEDURE — 82306 VITAMIN D 25 HYDROXY: CPT

## 2024-01-06 PROCEDURE — 80061 LIPID PANEL: CPT

## 2024-01-06 PROCEDURE — 85025 COMPLETE CBC W/AUTO DIFF WBC: CPT

## 2024-01-06 PROCEDURE — 36415 COLL VENOUS BLD VENIPUNCTURE: CPT

## 2024-01-06 PROCEDURE — 80053 COMPREHEN METABOLIC PANEL: CPT

## 2024-01-08 ENCOUNTER — OFFICE VISIT (OUTPATIENT)
Dept: PODIATRY | Facility: CLINIC | Age: 19
End: 2024-01-08
Payer: COMMERCIAL

## 2024-01-08 ENCOUNTER — OFFICE VISIT (OUTPATIENT)
Dept: PHYSICAL THERAPY | Facility: CLINIC | Age: 19
End: 2024-01-08
Payer: COMMERCIAL

## 2024-01-08 VITALS — WEIGHT: 170 LBS | HEIGHT: 66 IN | BODY MASS INDEX: 27.32 KG/M2

## 2024-01-08 DIAGNOSIS — M79.672 CHRONIC FOOT PAIN, LEFT: ICD-10-CM

## 2024-01-08 DIAGNOSIS — Q66.89 TARSAL COALITION OF LEFT FOOT: Primary | ICD-10-CM

## 2024-01-08 DIAGNOSIS — G89.29 CHRONIC FOOT PAIN, LEFT: ICD-10-CM

## 2024-01-08 PROCEDURE — 97140 MANUAL THERAPY 1/> REGIONS: CPT

## 2024-01-08 PROCEDURE — 97112 NEUROMUSCULAR REEDUCATION: CPT

## 2024-01-08 PROCEDURE — 97110 THERAPEUTIC EXERCISES: CPT

## 2024-01-08 PROCEDURE — 99213 OFFICE O/P EST LOW 20 MIN: CPT | Performed by: PODIATRIST

## 2024-01-08 NOTE — PROGRESS NOTES
"Daily Note     Today's date: 2024  Patient name: Mukund Sam  : 2005  MRN: [id number]  Referring provider: Geovanny Soni DPM  Dx:   Encounter Diagnosis     ICD-10-CM    1. Tarsal coalition of left foot  Q66.89               Start Time: 1400  Stop Time: 1445  Total time in clinic (min): 45 minutes    Subjective: Patient reports he saw the doctor today who told him he is making good progress and that full recovery can take up to 1 year. Reports continued pain with walking but improving overall. Reports he was able to walk in the snow with his cane.    Objective: See treatment diary below         Precautions: L subtalar fusion on 23     Manuals    L ankle PROM MK MK MK MK MK and toe ext MK   L ankle STM MK MK MK MK MK MK   L TC mobs MK MK MK MK MK and fibular AP MK   L ankle DF MWM   5\"x10 5\"x10 pink band 10\"x10 pink band 10\"x10 pink band   Neuro Re-Ed         Tandem balance          Step taps         L ankle eversion/inversion isometrics 5\"x10 10\"x10 5\"x10 5\"x10 5\"x10 5\"x10   SLS 3x30\" on foam blk 3x30\" on foam blk 3x30\" on foam blk   3x30\" on foam blk   BIODEX         Ladder drills         Tandem walking         Wall sits         Eccentric lateral step tap  nv 6 in 2x10 4in 3x10  6 in 2x10   Towel scrunches         Toe yoga         Soleus heel raises 5x5 5x5 5x6 5x6 5x6 5x6   Balance board 2x1' ea        Ther Ex         Bike 5' L6        TM  5 min 5 min 5 min 5 min 5 min   Standing soleus stretch   Trial p!  3x20\"    Standing gastroc stretch    Prostretch 3x20\"     Pistol squat   Trial       Ankle 3-way         Heel raises B 2x10 10\" hold B 2x10 10\" hold B 2x10 10\" hold B 2x10 10\" hold B 2x10 10\" hold B 2x10 10\" hold   Eccentric heel raises 2x10 on foam 2x10 on foam 2x10 on foam   1x15 on foam   Leg press LLE 105lb 3x10 LLE 115lb 3x10 LLE 115lb 3x10 LLE 125lb 3x10 LLE 135lb 3x10 LLE 135lb 3x10   Toe walking         HR on leg press L SL 55lb 3x10 L SL 65lb " 2x10 L SL 65lb 2x10 L SL 75lb 3x10 L SL 75lb 3x10 L SL 75lb 3x10   Ther Activity         Step ups         Hurdles         Step downs         Obstacle course         Sit to stands   Staggered L behind 2x10 Staggered L behind 3x10 Staggered L behind 3x10 Staggered L behind 3x10   SL RDL         Lunges   On step nv With mob 1x10 ea 1x10 ea   Stairs         Gait Training         SPC  5 min       Outside / hard uneven surface 4 laps post     4 laps post   Modalities                              Assessment: Tolerated treatment well. Patient demonstrated fatigue post treatment, exhibited good technique with therapeutic exercises, and would benefit from continued PT. Continuing to make improvements in LLE strength, stability, and endurance however continues to demonstrate fatigue and muscle shaking post session.         Plan: Continue per plan of care.  Progress treatment as tolerated.    Lavern Morin

## 2024-01-08 NOTE — PROGRESS NOTES
Podiatry Clinic  Mukund Sam 18 y.o. male MRN: [id number]  Encounter: [id number]      Assessment/Plan        Diagnoses and all orders for this visit:    Tarsal coalition of left foot    Chronic foot pain, left           Plan:  Patient was seen/examined. All questions and concerns addressed  Patient presenting s/p 6 months for left subtalar joint fusion full weightbearing in sneakers with reduction in pain and improved in balance issues with graduation from walker to a cane to assist in ambulation.  Given improvement in pain and functionality, recommend patient continue physical therapy at this time until he feels he has maximized his strength and conditioning with physical therapy.  Recommend continued strengthening and condition independently with   Discussed with patient that post operative pain and lateral left foot paresthesia can persist for upwards of 1 year in the setting of hindfoot surgery.    RTC in 3 month(s)    Dr. Soni was present during this entire procedure.     History of Present Illness     HPI: 18-year-old patient presenting s/p 6 months for left subtalar joint fusion who presents today full weightbearing in sneakers with the use of a cane for assistance in balance.  Patient reports that his pain has gotten better, however he still having pain when walking.  Patient states that he longer experiences stiffness of his left foot every day.  Patient continues to go see physical therapy on a regular basis and has seen improvement in both pain reduction and balance improvement.  Patient reports that he has pain and numbness to the lateral aspect of his left foot around his fifth and fourth toes.  Patient states that this has improved as it used to be that his entire forefoot.      Review of Systems   Constitutional: Negative.    HENT: Negative.    Eyes: Negative.    Respiratory: Negative.    Cardiovascular: Negative.    Gastrointestinal: Negative.    Musculoskeletal:   "Postoperative left foot pain and lateral distal foot numbness and pain negative  Skin: Negative  Neurological: Negative.       Historical Information   Past Medical History:   Diagnosis Date    Anxiety     Autism 2004    Dr. Panchal    Verbal apraxia 2004    Dr. Panchal      Past Surgical History:   Procedure Laterality Date    NO PAST SURGERIES      NE ARTHRODESIS SUBTALAR Left 7/31/2023    Procedure: ARTHRODESIS / FUSION SUBTALAR JOINT;  Surgeon: Geovanny Soni DPM;  Location:  MAIN OR;  Service: Podiatry     Social History   Social History     Substance and Sexual Activity   Alcohol Use Never     Social History     Substance and Sexual Activity   Drug Use Never     Social History     Tobacco Use   Smoking Status Never   Smokeless Tobacco Never     Family History:   Family History   Problem Relation Age of Onset    Learning disabilities Mother         Reading as a child    Obesity Mother     Diabetes Mother     Bipolar disorder Father     Depression Father     Anxiety disorder Maternal Grandmother     Obesity Maternal Grandmother     Depression Maternal Grandfather     Obesity Maternal Grandfather     Obesity Paternal Grandmother     Cancer Paternal Grandmother     Cancer Paternal Grandfather     Anxiety disorder Maternal Aunt     Bipolar disorder Maternal Aunt     Depression Maternal Aunt     Developmental delay Maternal Aunt         Did not talk until 2 years of age    Obesity Paternal Aunt     Depression Cousin     Obesity Maternal Aunt     Schizoaffective Disorder  Other     Breast cancer additional onset Other        Meds/Allergies   (Not in a hospital admission)    No Known Allergies    Objective     Current Vitals:   Height: 5' 6\" (167.6 cm) (01/08/24 0912)  Weight - Scale: 77.1 kg (170 lb) (01/08/24 0912)        Ht 5' 6\" (1.676 m)   Wt 77.1 kg (170 lb)   BMI 27.44 kg/m²       Lower Extremity Exam:    Vascular: Right foot DP/PT +2                   Left foot DP/PT +2                   There is no lower " extremity edema bilateral.    Musculoskeletal: There is 5/5 strength throughout the bilateral lower extremity.           full ankle range of motion with rigid subtalar range of motion at the left lower extremity           There is mild tenderness over the left lateral forefoot and incision site at the lateral ankle.               There is foot deformities: pes planus    Biomechanical Exam of LE:  Hip ROM WNL Bilateral, external and internal rotation about equal at 45° b/l  Knee ROM WNL Bilateral, no hyperextension noted b/l, no genu varum/valgum noted b/l  Ankle dorsiflexion with knee extended is limited and unable to get pass vertical on right and able to get pass vertical on left  Ankle dorsiflexion with knee flexed is limited and unable to get pass vertical on right and able to get pass vertical on left  Malleolar positioning is WNL b/l  STJ ROM WNL b/l, heel inversion is approximately 20° on right and 5° on left; heel eversion is approximately 10° on right and 0° on left; neutral calcaneal stance position is 5° valgus  1st ray ROM WNL b/l, able to dorsiflex/plantarflex b/l  Tibial varum is 0° b/l, Resting calcaneal stance position is valgus and approximately 6° on right and valgus and approximately 3° on left  Gait analysis: WNL  Gross deformity noted:  S/p left subtalar joint fusion and pes planus      Neurological: Sensation to 5.07 [place] nylon filament: positive bilaterally      Vibratory sense to distal Foot  positive bilaterally      Sharp/Dull sense is positive bilaterally      Dermatology: Skin Condition:  normal and with healed incision at the lateral hindfoot distal to the lateral malleolus     There is not evidence of macerated tissue between toe spaces.       Nail Exam: normal nails without lesions.     Open ulcerations: No     Calluses: No

## 2024-01-11 ENCOUNTER — OFFICE VISIT (OUTPATIENT)
Dept: PHYSICAL THERAPY | Facility: CLINIC | Age: 19
End: 2024-01-11
Payer: COMMERCIAL

## 2024-01-11 DIAGNOSIS — Q66.89 TARSAL COALITION OF LEFT FOOT: Primary | ICD-10-CM

## 2024-01-11 PROCEDURE — 97140 MANUAL THERAPY 1/> REGIONS: CPT

## 2024-01-11 PROCEDURE — 97110 THERAPEUTIC EXERCISES: CPT

## 2024-01-11 PROCEDURE — 97112 NEUROMUSCULAR REEDUCATION: CPT

## 2024-01-11 NOTE — PROGRESS NOTES
"PT Re-evaluation     Today's date: 2024  Patient name: Mukund Sam  : 2005  MRN: [id number]  Referring provider: Geovanny Soni DPM  Dx:   Encounter Diagnosis     ICD-10-CM    1. Tarsal coalition of left foot  Q66.89                 Start Time: 1400  Stop Time: 1445  Total time in clinic (min): 45 minutes    Subjective: Patient reports he has noticed improvements in his pain levels, L ankle mobility, strength, and walking tolerance. Reports he is able to walk short distances and in his house without an AD however uses a SPC when walking longer distances or when his LLE is fatigued. Reports continued difficulty and pain with pushing off through his toes when walking. Also notes difficulty with prolonged walking, jogging, navigating stairs, and exercising at the gym. Reports 3/10 pain currently and 7/10 pain at worst. Reports he would like to continue PT to maximize his strength, function, walk outside, and return to his previous exercise level at the gym. Reports he would like to focus on his strength, balance, and walking.    Objective: See treatment diary below    Goals  STG - to be met by week 4  1. Patient will be independent with HEP throughout therapy to prepare for eventual transition to maintenance program. - goal met  2. Patient will improve subjective pain to <=4/10 at worst to improve QOL. - in progress  3. Patient will improve L ankle DF AROM to 5 deg to walk with less difficulty. - goal met  4. Patient will be able to walk without an AD to return to PLOF. - in progress     LTG - to be met by discharge   1. Patient will improve L ankle AROM to WFL to perform ADLs with less difficulty. - goal met  2. Patient will improve L ankle strength to >=4/5 to improve functional mobility. - goal met  3. Patient will improve L SLS to 20\" to improve functional mobility. - in progress  4. Patient will improve FOTO score to age matched prediction to demonstrate functional improvements. - in " "progress  5. Patient will be able to return to his normal exercise program without difficulty to return to PLOF. - in progress     Observations      Additional Observation Details  Gait Analysis: intermittent SPC use with step through pattern and decreased L heel-off - uses SPC when fatigued and feels more off-balanced      Neurological Testing      Additional Neurological Details  Diminished sensation at L lateral foot and 5th toe, all else WFL     Active Range of Motion (progress note 2/ progress note 1/ IE)   Left Ankle/Foot   Dorsiflexion (ke): 10 deg / 8 deg / -10 degrees   Plantar flexion: 55 deg /  50 deg / 20 degrees with pain  Inversion: 20 deg /  20 deg / 0 degrees   Eversion: 6 deg /  5 deg / 5 degrees with pain     Right Ankle/Foot   Normal active range of motion     Passive Range of Motion (progress note 2/ progress note 1/ IE)   Left Ankle/Foot     Dorsiflexion (ke): 10 deg /  10 deg / -5 degrees   Plantar flexion: 60 deg /  55 deg / 30 degrees with pain  Inversion: 25 deg /  25 deg / 5 degrees   Eversion: 10 deg /  10 deg / 10 degrees with pain     Right Ankle/Foot  Normal passive range of motion     Strength/Myotome Testing (progress note 2/ progress note 1/ IE)      Left Ankle/Foot   Dorsiflexion: 5 /  4 / 2  Plantar flexion: 3+ /  3 / 2  Inversion: 4 /  4- / 2-  Eversion: 4 /  3+ / 2-     Right Ankle/Foot   Dorsiflexion: 5 /  4 / 4-  Plantar flexion: 4 /  4 / 4-  Inversion: 5 /  4 / 4-  Eversion: 5 /  4 / 4-     General Comments:        Ankle/Foot Comments (progress note 2/ progress note 1/ IE)   Single leg balance:   LLE -  6 sec / 4 sec / unable     Precautions: L subtalar fusion on 7/31/23     Manuals 12/21 12/26 12/28 1/2 1/4 1/8 1/11   L ankle PROM MK MK MK MK MK and toe ext MK MK and toe ext   L ankle STM MK MK MK MK MK MK MK   L TC mobs MK MK MK MK MK and fibular AP MK MK and fibular AP   L ankle DF MWM   5\"x10 5\"x10 pink band 10\"x10 pink band 10\"x10 pink band    Neuro Re-Ed          Tandem " "balance           Step taps          L ankle eversion/inversion isometrics 5\"x10 10\"x10 5\"x10 5\"x10 5\"x10 5\"x10    SLS 3x30\" on foam blk 3x30\" on foam blk 3x30\" on foam blk   3x30\" on foam blk 3x30\" on foam blk   BIODEX          Ladder drills          Tandem walking          Wall sits          Eccentric lateral step tap  nv 6 in 2x10 4in 3x10  6 in 2x10 6 in 3x10   Towel scrunches          Toe yoga          Soleus heel raises 5x5 5x5 5x6 5x6 5x6 5x6    Balance board 2x1' ea         Ther Ex          Bike 5' L6         TM  5 min 5 min 5 min 5 min 5 min 5 min   Standing soleus stretch   Trial p!  3x20\"     Standing gastroc stretch    Prostretch 3x20\"      Pistol squat   Trial        Ankle 3-way          Heel raises B 2x10 10\" hold B 2x10 10\" hold B 2x10 10\" hold B 2x10 10\" hold B 2x10 10\" hold B 2x10 10\" hold B 5\" 3x10   Eccentric heel raises 2x10 on foam 2x10 on foam 2x10 on foam   1x15 on foam 1x15   Leg press LLE 105lb 3x10 LLE 115lb 3x10 LLE 115lb 3x10 LLE 125lb 3x10 LLE 135lb 3x10 LLE 135lb 3x10 LLE 145lb 3x10   Toe walking          HR on leg press L SL 55lb 3x10 L SL 65lb 2x10 L SL 65lb 2x10 L SL 75lb 3x10 L SL 75lb 3x10 L SL 75lb 3x10 L SL 75lb 3x10   Ther Activity          Step ups          Hurdles          Step downs          Obstacle course          Sit to stands   Staggered L behind 2x10 Staggered L behind 3x10 Staggered L behind 3x10 Staggered L behind 3x10    SL RDL          Lunges   On step nv With mob 1x10 ea 1x10 ea    Stairs          Gait Training          SPC  5 min        Outside / hard uneven surface 4 laps post     4 laps post 1 lap ea - normal, tandem, high knee   Modalities                                 Assessment: Tolerated treatment well. Patient demonstrated fatigue post treatment, exhibited good technique with therapeutic exercises, and would benefit from continued PT.   Patient presents to PT for a progress note. Subjectively, patient reports 50% improvement since beginning PT and reports " improvements in his L ankle mobility, walking tolerance, strength, and balance. Objectively, patient has made improvements in L ankle PROM/AROM, LLE strength, and L single leg balance. Despite these improvements, patient continues to demonstrate deficits in L ankle strength, L single leg balance, gait quality, and LLE strength leading to continued difficulty with walking, navigating stairs, and exercising at the gym. Patient would benefit from continued PT to address above impairments and achieve remaining therapy goals.           Plan: Continue per plan of care.  Recommend continued PT 2x/week for 8 weeks.    Lavern Morin

## 2024-01-12 ENCOUNTER — OFFICE VISIT (OUTPATIENT)
Dept: FAMILY MEDICINE CLINIC | Facility: CLINIC | Age: 19
End: 2024-01-12
Payer: COMMERCIAL

## 2024-01-12 VITALS
HEIGHT: 66 IN | TEMPERATURE: 98 F | BODY MASS INDEX: 25.71 KG/M2 | DIASTOLIC BLOOD PRESSURE: 80 MMHG | SYSTOLIC BLOOD PRESSURE: 110 MMHG | WEIGHT: 160 LBS | HEART RATE: 100 BPM | OXYGEN SATURATION: 98 %

## 2024-01-12 DIAGNOSIS — Z00.00 ANNUAL PHYSICAL EXAM: Primary | ICD-10-CM

## 2024-01-12 DIAGNOSIS — R14.0 BLOATING: ICD-10-CM

## 2024-01-12 DIAGNOSIS — Q66.89 TARSAL COALITION OF LEFT FOOT: ICD-10-CM

## 2024-01-12 DIAGNOSIS — I10 PRIMARY HYPERTENSION: ICD-10-CM

## 2024-01-12 DIAGNOSIS — E66.3 OVERWEIGHT: ICD-10-CM

## 2024-01-12 DIAGNOSIS — Z13.220 SCREENING FOR LIPID DISORDERS: ICD-10-CM

## 2024-01-12 DIAGNOSIS — E55.9 VITAMIN D DEFICIENCY: ICD-10-CM

## 2024-01-12 PROBLEM — H35.89 BULL'S EYE MACULOPATHY: Status: RESOLVED | Noted: 2022-04-26 | Resolved: 2024-01-12

## 2024-01-12 PROCEDURE — 99214 OFFICE O/P EST MOD 30 MIN: CPT | Performed by: NURSE PRACTITIONER

## 2024-01-12 PROCEDURE — 99395 PREV VISIT EST AGE 18-39: CPT | Performed by: NURSE PRACTITIONER

## 2024-01-12 NOTE — ASSESSMENT & PLAN NOTE
Patient reports that his symptoms are currently manageable.  Patient continues to follow with podiatry and PT for this.

## 2024-01-12 NOTE — ASSESSMENT & PLAN NOTE
Lab testing was ordered to assess for any food intolerances which could be contributing to patient's postprandial bloating.

## 2024-01-12 NOTE — ASSESSMENT & PLAN NOTE
Patient was referred to nutritionist to discuss diet changes which can continue to promote weight loss and healthy eating.

## 2024-01-12 NOTE — PROGRESS NOTES
ADULT ANNUAL PHYSICAL  [facility]    NAME: Mukund Sam  AGE: 18 y.o. SEX: male  : 2005     DATE: 2024     Assessment and Plan:     Problem List Items Addressed This Visit     Vitamin D deficiency     Repeat vitamin D level was ordered to be completed prior to patient's next office visit.  Patient was advised to continue daily vitamin D supplement.         Relevant Orders    Vitamin D 25 hydroxy    Hypertension     This remains diet controlled.         Relevant Orders    Comprehensive metabolic panel    UA w Reflex to Microscopic w Reflex to Culture    Tarsal coalition of left foot     Patient reports that his symptoms are currently manageable.  Patient continues to follow with podiatry and PT for this.         Overweight     Patient was referred to nutritionist to discuss diet changes which can continue to promote weight loss and healthy eating.         Relevant Orders    Ambulatory Referral to Nutrition Services    Bloating     Lab testing was ordered to assess for any food intolerances which could be contributing to patient's postprandial bloating.         Relevant Orders    Celiac Disease Antibody Profile    Food Allergy Profile   Other Visit Diagnoses     Annual physical exam    -  Primary    Screening for lipid disorders        Relevant Orders    Lipid Panel with Direct LDL reflex            Immunizations and preventive care screenings were discussed with patient today. Appropriate education was printed on patient's after visit summary.    Counseling:  Alcohol/drug use: discussed moderation in alcohol intake, the recommendations for healthy alcohol use, and avoidance of illicit drug use.  Dental Health: discussed importance of regular tooth brushing, flossing, and dental visits.  Injury prevention: discussed safety/seat belts, safety helmets, smoke detectors, carbon dioxide detectors, and smoking near bedding or upholstery.  Sexual  health: discussed sexually transmitted diseases, partner selection, use of condoms, avoidance of unintended pregnancy, and contraceptive alternatives.  Exercise: the importance of regular exercise/physical activity was discussed. Recommend exercise 3-5 times per week for at least 30 minutes.       Depression Screening and Follow-up Plan: Patient was screened for depression during today's encounter. They screened negative with a PHQ-2 score of 0.        Return in about 1 year (around 1/12/2025) for Annual physical.     Chief Complaint:     Chief Complaint   Patient presents with   • Follow-up     6 month f/u      History of Present Illness:     Adult Annual Physical   Patient here for a comprehensive physical exam. The patient reports no problems.    Hypertension: Patient's blood pressure is at goal in the office today.  Patient denies lightheadedness, dizziness, frequent headaches, or vision changes.    Tarsal coalition of left foot: Patient is s/p left subtalar joint fusion.  Patient continues to follow with podiatry for this and per their most recent note he is full weightbearing at this point.  Patient also continues to follow with PT for this.    Vitamin D deficiency: Patient's most recent vitamin D level was slightly low.  Patient is currently taking a daily vitamin D supplement.    Bloating: Patient reports that recently he has been noting increased bloating after eating.  He reports that he notes this most after eating milk containing foods as well as gluten containing foods.  Patient has never had celiac testing completed.  Patient currently denies any other GERD symptoms or GI changes.    Diet and Physical Activity  Diet/Nutrition: well balanced diet, limited junk food, consuming 3-5 servings of fruits/vegetables daily, and adequate fiber intake.   Exercise: moderate cardiovascular exercise, strength training exercises, 5-7 times a week on average, and 30-60 minutes on average.      Depression  Screening  PHQ-2/9 Depression Screening    Little interest or pleasure in doing things: 0 - not at all  Feeling down, depressed, or hopeless: 0 - not at all  PHQ-2 Score: 0  PHQ-2 Interpretation: Negative depression screen       General Health  Sleep: sleeps well and gets 7-8 hours of sleep on average.   Hearing: normal - bilateral.  Vision: no vision problems and most recent eye exam >1 year ago.   Dental: no dental visits for >1 year, brushes teeth twice daily, and flosses teeth occasionally.        Health  History of STDs?: no.    Advanced Care Planning  Do you have an advanced directive? no  Do you have a durable medical power of ? no     Review of Systems:     Review of Systems   Constitutional:  Negative for chills and fever.   HENT:  Negative for ear pain and sore throat.    Eyes:  Negative for pain and visual disturbance.   Respiratory:  Negative for cough, chest tightness, shortness of breath and wheezing.    Cardiovascular:  Negative for chest pain, palpitations and leg swelling.   Gastrointestinal:  Negative for abdominal pain, constipation, diarrhea, nausea and vomiting.        Bloating after eating   Endocrine: Negative for cold intolerance and heat intolerance.   Genitourinary:  Negative for decreased urine volume, dysuria and hematuria.   Musculoskeletal:  Negative for arthralgias, back pain and myalgias.   Skin:  Negative for color change and rash.   Allergic/Immunologic: Negative for environmental allergies.   Neurological:  Negative for dizziness, seizures, syncope, weakness, light-headedness, numbness and headaches.   Hematological:  Negative for adenopathy.   Psychiatric/Behavioral:  Negative for confusion. The patient is not nervous/anxious.    All other systems reviewed and are negative.     Past Medical History:     Past Medical History:   Diagnosis Date   • Anxiety    • Autism 2004    Dr. 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 BMI 99.3% at 11 year well. Has not had anything other than water yet today so with send for obesity lab panel, will be a fasting sample.   • Verbal apraxia 2004    Dr. Panchal       Past Surgical History:     Past Surgical History:   Procedure Laterality Date   • NO PAST SURGERIES     • MA ARTHRODESIS SUBTALAR Left 7/31/2023    Procedure: ARTHRODESIS / FUSION SUBTALAR JOINT;  Surgeon: Geovanny Soni DPM;  Location:  MAIN OR;  Service: Podiatry      Social History:     Social History     Socioeconomic History   • Marital status: Single     Spouse name: None   • Number of children: None   • Years of education: None   • Highest education level: None   Occupational History   • None   Tobacco Use   • Smoking status: Never   • Smokeless tobacco: Never   Vaping Use   • Vaping status: Never Used   Substance and Sexual Activity   • Alcohol use: Never   • Drug use: Never   • Sexual activity: None   Other Topics Concern   • None   Social History Narrative    No handguns in the home.      Social Determinants of Health     Financial Resource Strain: Not on file   Food Insecurity: Not on file   Transportation Needs: Not on file   Physical Activity: Not on file   Stress: Not on file   Social Connections: Not on file   Intimate Partner Violence: Not on file   Housing Stability: Not on file      Family History:     Family History   Problem Relation Age of Onset   • Learning disabilities Mother         Reading as a child   • Obesity Mother    • Diabetes Mother    • Bipolar disorder Father    • Depression Father    • Anxiety disorder Maternal Grandmother    • Obesity Maternal Grandmother    • Depression Maternal Grandfather    • Obesity Maternal Grandfather    • Obesity Paternal Grandmother    • Cancer Paternal Grandmother    • Cancer Paternal Grandfather    • Anxiety  "disorder Maternal Aunt    • Bipolar disorder Maternal Aunt    • Depression Maternal Aunt    • Developmental delay Maternal Aunt         Did not talk until 2 years of age   • Obesity Paternal Aunt    • Depression Cousin    • Obesity Maternal Aunt    • Schizoaffective Disorder  Other    • Breast cancer additional onset Other       Current Medications:     Current Outpatient Medications   Medication Sig Dispense Refill   • aspirin 325 mg tablet Take 1 tablet (325 mg total) by mouth daily 60 tablet 0   • b complex vitamins capsule Take 1 capsule by mouth daily     • Multiple Vitamins-Minerals (MULTI-VITAMIN GUMMIES PO) Take by mouth     • VITAMIN D, CHOLECALCIFEROL, PO Take by mouth       No current facility-administered medications for this visit.      Allergies:     No Known Allergies   Physical Exam:     /80 (BP Location: Right arm, Patient Position: Sitting, Cuff Size: Adult)   Pulse 100   Temp 98 °F (36.7 °C) (Tympanic)   Ht 5' 6\" (1.676 m)   Wt 72.6 kg (160 lb)   SpO2 98%   BMI 25.82 kg/m²     Physical Exam  Vitals and nursing note reviewed.   Constitutional:       General: He is not in acute distress.     Appearance: Normal appearance. He is well-developed. He is not ill-appearing.   HENT:      Head: Normocephalic.   Eyes:      Conjunctiva/sclera: Conjunctivae normal.   Cardiovascular:      Rate and Rhythm: Normal rate and regular rhythm.      Pulses: Normal pulses.           Carotid pulses are 2+ on the right side and 2+ on the left side.       Radial pulses are 2+ on the right side and 2+ on the left side.        Posterior tibial pulses are 2+ on the right side and 2+ on the left side.      Heart sounds: Normal heart sounds. No murmur heard.  Pulmonary:      Effort: Pulmonary effort is normal. No respiratory distress.      Breath sounds: Normal breath sounds. No decreased breath sounds, wheezing, rhonchi or rales.   Abdominal:      General: Abdomen is flat. Bowel sounds are normal. There is no " distension.      Palpations: Abdomen is soft.      Tenderness: There is no abdominal tenderness. There is no guarding.   Musculoskeletal:         General: No swelling. Normal range of motion.      Cervical back: Normal range of motion and neck supple.      Right lower leg: No edema.      Left lower leg: No edema.   Skin:     General: Skin is warm and dry.      Capillary Refill: Capillary refill takes less than 2 seconds.   Neurological:      General: No focal deficit present.      Mental Status: He is alert and oriented to person, place, and time.   Psychiatric:         Mood and Affect: Mood normal.         Behavior: Behavior normal.         Thought Content: Thought content normal.         Judgment: Judgment normal.          ARA Villarreal   [facility]

## 2024-01-12 NOTE — PATIENT INSTRUCTIONS
Dr. Yvette Roldan-[phone]  Wellness Visit for Adults   AMBULATORY CARE:   A wellness visit  is when you see your healthcare provider to get screened for health problems. Your healthcare provider will also give you advice on how to stay healthy. Write down your questions so you remember to ask them. Ask your healthcare provider how often you should have a wellness visit.  What happens at a wellness visit:  Your healthcare provider will ask about your health, and your family history of health problems. This includes high blood pressure, heart disease, and cancer. He or she will ask if you have symptoms that concern you, if you smoke, and about your mood. You may also be asked about your intake of medicines, supplements, food, and alcohol. Any of the following may be done:  Your weight  will be checked. Your height may also be checked so your body mass index (BMI) can be calculated. Your BMI shows if you are at a healthy weight.    Your blood pressure  and heart rate will be checked. Your temperature may also be checked.    Blood and urine tests  may be done. Blood tests may be done to check your cholesterol levels. Abnormal cholesterol levels increase your risk for heart disease and stroke. You may also need a blood or urine test to check for diabetes if you are at increased risk. Urine tests may be done to look for signs of an infection or kidney disease.    A physical exam  includes checking your heartbeat and lungs with a stethoscope. Your healthcare provider may also check your skin to look for sun damage.    Screening tests  may be recommended. A screening test is done to check for diseases that may not cause symptoms. The screening tests you may need depend on your age, gender, family history, and lifestyle habits. For example, colorectal screening may be recommended if you are 50 years old or older.    Screening tests you need if you are a woman:   A Pap smear  is used to screen for cervical cancer. Pap  smears are usually done every 3 to 5 years depending on your age. You may need them more often if you have had abnormal Pap smear test results in the past. Ask your healthcare provider how often you should have a Pap smear.    A mammogram  is an x-ray of your breasts to screen for breast cancer. Experts recommend mammograms every 2 years starting at age 50 years. You may need a mammogram at age 49 years or younger if you have an increased risk for breast cancer. Talk to your healthcare provider about when you should start having mammograms and how often you need them.    Vaccines you may need:   Get an influenza vaccine  every year. The influenza vaccine protects you from the flu. Several types of viruses cause the flu. The viruses change over time, so new vaccines are made each year.    Get a tetanus-diphtheria (Td) booster vaccine  every 10 years. This vaccine protects you against tetanus and diphtheria. Tetanus is a severe infection that may cause painful muscle spasms and lockjaw. Diphtheria is a severe bacterial infection that causes a thick covering in the back of your mouth and throat.    Get a human papillomavirus (HPV) vaccine  if you are female and aged 19 to 26 or male 19 to 21 and never received it. This vaccine protects you from HPV infection. HPV is the most common infection spread by sexual contact. HPV may also cause vaginal, penile, and anal cancers.    Get a pneumococcal vaccine  if you are aged 65 years or older. The pneumococcal vaccine is an injection given to protect you from pneumococcal disease. Pneumococcal disease is an infection caused by pneumococcal bacteria. The infection may cause pneumonia, meningitis, or an ear infection.    Get a shingles vaccine  if you are 60 or older, even if you have had shingles before. The shingles vaccine is an injection to protect you from the varicella-zoster virus. This is the same virus that causes chickenpox. Shingles is a painful rash that develops in  people who had chickenpox or have been exposed to the virus.    How to eat healthy:  My Plate is a model for planning healthy meals. It shows the types and amounts of foods that should go on your plate. Fruits and vegetables make up about half of your plate, and grains and protein make up the other half. A serving of dairy is included on the side of your plate. The amount of calories and serving sizes you need depends on your age, gender, weight, and height. Examples of healthy foods are listed below:  Eat a variety of vegetables  such as dark green, red, and orange vegetables. You can also include canned vegetables low in sodium (salt) and frozen vegetables without added butter or sauces.    Eat a variety of fresh fruits , canned fruit in 100% juice, frozen fruit, and dried fruit.    Include whole grains.  At least half of the grains you eat should be whole grains. Examples include whole-wheat bread, wheat pasta, brown rice, and whole-grain cereals such as oatmeal.    Eat a variety of protein foods such as seafood (fish and shellfish), lean meat, and poultry without skin (turkey and chicken). Examples of lean meats include pork leg, shoulder, or tenderloin, and beef round, sirloin, tenderloin, and extra lean ground beef. Other protein foods include eggs and egg substitutes, beans, peas, soy products, nuts, and seeds.    Choose low-fat dairy products such as skim or 1% milk or low-fat yogurt, cheese, and cottage cheese.    Limit unhealthy fats  such as butter, hard margarine, and shortening.       Exercise:  Exercise at least 30 minutes per day on most days of the week. Some examples of exercise include walking, biking, dancing, and swimming. You can also fit in more physical activity by taking the stairs instead of the elevator or parking farther away from stores. Include muscle strengthening activities 2 days each week. Regular exercise provides many health benefits. It helps you manage your weight, and decreases  your risk for type 2 diabetes, heart disease, stroke, and high blood pressure. Exercise can also help improve your mood. Ask your healthcare provider about the best exercise plan for you.       General health and safety guidelines:   Do not smoke.  Nicotine and other chemicals in cigarettes and cigars can cause lung damage. Ask your healthcare provider for information if you currently smoke and need help to quit. E-cigarettes or smokeless tobacco still contain nicotine. Talk to your healthcare provider before you use these products.    Limit alcohol.  A drink of alcohol is 12 ounces of beer, 5 ounces of wine, or 1½ ounces of liquor.    Lose weight, if needed.  Being overweight increases your risk of certain health conditions. These include heart disease, high blood pressure, type 2 diabetes, and certain types of cancer.    Protect your skin.  Do not sunbathe or use tanning beds. Use sunscreen with a SPF 15 or higher. Apply sunscreen at least 15 minutes before you go outside. Reapply sunscreen every 2 hours. Wear protective clothing, hats, and sunglasses when you are outside.    Drive safely.  Always wear your seatbelt. Make sure everyone in your car wears a seatbelt. A seatbelt can save your life if you are in an accident. Do not use your cell phone when you are driving. This could distract you and cause an accident. Pull over if you need to make a call or send a text message.    Practice safe sex.  Use latex condoms if are sexually active and have more than one partner. Your healthcare provider may recommend screening tests for sexually transmitted infections (STIs).    Wear helmets, lifejackets, and protective gear.  Always wear a helmet when you ride a bike or motorcycle, go skiing, or play sports that could cause a head injury. Wear protective equipment when you play sports. Wear a lifejacket when you are on a boat or doing water sports.    © Copyright [employer] 2023 Information is for End User's use only and may  not be sold, redistributed or otherwise used for commercial purposes.  The above information is an  only. It is not intended as medical advice for individual conditions or treatments. Talk to your doctor, nurse or pharmacist before following any medical regimen to see if it is safe and effective for you.

## 2024-01-12 NOTE — ASSESSMENT & PLAN NOTE
Repeat vitamin D level was ordered to be completed prior to patient's next office visit.  Patient was advised to continue daily vitamin D supplement.

## 2024-01-12 NOTE — PROGRESS NOTES
"Name: Mukund Sam      : 2005      MRN: [id number]  Encounter Provider: ARA Villarreal  Encounter Date: 2024   Encounter department: [facility]    Assessment & Plan     {There are no diagnoses linked to this encounter. (Refresh or delete this SmartLink)}       Subjective      HPI  Review of Systems    Current Outpatient Medications on File Prior to Visit   Medication Sig   • aspirin 325 mg tablet Take 1 tablet (325 mg total) by mouth daily   • b complex vitamins capsule Take 1 capsule by mouth daily   • Multiple Vitamins-Minerals (MULTI-VITAMIN GUMMIES PO) Take by mouth   • VITAMIN D, CHOLECALCIFEROL, PO Take by mouth   • Ibuprofen (CHILDRENS MOTRIN PO) Take by mouth (Patient not taking: Reported on 2024)   • oxyCODONE-acetaminophen (ROXICET) 5-325 mg/5 mL solution Take 5 mL by mouth every 4 (four) hours as needed for moderate pain Max Daily Amount: 30 mL (Patient not taking: Reported on 10/9/2023)       Objective     /80 (BP Location: Right arm, Patient Position: Sitting, Cuff Size: Adult)   Pulse 100   Temp 98 °F (36.7 °C) (Tympanic)   Ht 5' 6\" (1.676 m)   Wt 72.6 kg (160 lb)   SpO2 98%   BMI 25.82 kg/m²     Physical Exam  ARA Villarreal    "

## 2024-01-15 ENCOUNTER — OFFICE VISIT (OUTPATIENT)
Dept: PHYSICAL THERAPY | Facility: CLINIC | Age: 19
End: 2024-01-15
Payer: COMMERCIAL

## 2024-01-15 DIAGNOSIS — Q66.89 TARSAL COALITION OF LEFT FOOT: Primary | ICD-10-CM

## 2024-01-15 PROCEDURE — 97530 THERAPEUTIC ACTIVITIES: CPT

## 2024-01-15 PROCEDURE — 97110 THERAPEUTIC EXERCISES: CPT

## 2024-01-15 PROCEDURE — 97112 NEUROMUSCULAR REEDUCATION: CPT

## 2024-01-15 NOTE — PROGRESS NOTES
"PT Re-evaluation     Today's date: 1/15/2024  Patient name: Mukund Sam  : 2005  MRN: [id number]  Referring provider: Geovanny Soni DPM  Dx:   Encounter Diagnosis     ICD-10-CM    1. Tarsal coalition of left foot  Q66.89             Start Time: 1400  Stop Time: 1445  Total time in clinic (min): 45 minutes    Subjective: Patient reports he got new shoes which are helping a little with his pain levels with walking. Continued pain with pushing off of his toes. Reports difficulty with walking and carrying heavy things.    Objective: See treatment diary below       Precautions: L subtalar fusion on 23     Manuals 12/21 12/26 12/28 1/2 1/4 1/8 1/11 1/15   L ankle PROM MK MK MK MK MK and toe ext MK MK and toe ext MK and toe ext   L ankle STM MK MK MK MK MK MK MK MK   L TC mobs MK MK MK MK MK and fibular AP MK MK and fibular AP MK   L ankle DF MWM   5\"x10 5\"x10 pink band 10\"x10 pink band 10\"x10 pink band     Neuro Re-Ed           Tandem balance            Step taps           L ankle eversion/inversion isometrics 5\"x10 10\"x10 5\"x10 5\"x10 5\"x10 5\"x10     SLS 3x30\" on foam blk 3x30\" on foam blk 3x30\" on foam blk   3x30\" on foam blk 3x30\" on foam blk 1x30\", 2x30\" on foam blk   BIODEX           Ladder drills           Tandem walking           Wall sits           Eccentric lateral step tap  nv 6 in 2x10 4in 3x10  6 in 2x10 6 in 3x10 6 in 3x10   Towel scrunches           Toe yoga           Soleus heel raises 5x5 5x5 5x6 5x6 5x6 5x6  Stride stance 1x15 ea   Balance board 2x1' ea          Ther Ex           Bike 5' L6          TM  5 min 5 min 5 min 5 min 5 min 5 min 5 min   Standing soleus stretch   Trial p!  3x20\"      Standing gastroc stretch    Prostretch 3x20\"       Pistol squat   Trial         Ankle 3-way           Heel raises B 2x10 10\" hold B 2x10 10\" hold B 2x10 10\" hold B 2x10 10\" hold B 2x10 10\" hold B 2x10 10\" hold B 5\" 3x10 B 5\" 3x10   Eccentric heel raises 2x10 on foam 2x10 on foam 2x10 on foam "   1x15 on foam 1x15 2x10   Leg press LLE 105lb 3x10 LLE 115lb 3x10 LLE 115lb 3x10 LLE 125lb 3x10 LLE 135lb 3x10 LLE 135lb 3x10 LLE 145lb 3x10 LLE 155lb 3x10   Toe walking           HR on leg press L SL 55lb 3x10 L SL 65lb 2x10 L SL 65lb 2x10 L SL 75lb 3x10 L SL 75lb 3x10 L SL 75lb 3x10 L SL 75lb 3x10 L SL 75lb 3x10 (dc nv)   Ther Activity           High knee walk to heel off        nv   [place] carry        Box 15lb 2 laps   Step downs           Obstacle course           Sit to stands   Staggered L behind 2x10 Staggered L behind 3x10 Staggered L behind 3x10 Staggered L behind 3x10  Staggered L behind 3x10   SL RDL           Lunges   On step nv With mob 1x10 ea 1x10 ea  1x10 ea   Stairs           Gait Training           SPC  5 min         Outside / hard uneven surface 4 laps post     4 laps post 1 lap ea - normal, tandem, high knee    Modalities                                    Assessment: Tolerated treatment well. Patient demonstrated fatigue post treatment, exhibited good technique with therapeutic exercises, and would benefit from continued PT.   Added farmers carry to address difficulty with carrying objects while walking with good stability noted however increased discomfort noted due to not being able to use the cane. Progressed soleus heel raises to being performed in stride stance with good challenge and fatigue levels noted. LLE challenge and fatigue noted with L SL strengthening leading to decreased stability during gait once fatigued.          Plan: Continue per plan of care.      Lavern Morin

## 2024-01-18 ENCOUNTER — OFFICE VISIT (OUTPATIENT)
Dept: PHYSICAL THERAPY | Facility: CLINIC | Age: 19
End: 2024-01-18
Payer: COMMERCIAL

## 2024-01-18 DIAGNOSIS — Q66.89 TARSAL COALITION OF LEFT FOOT: Primary | ICD-10-CM

## 2024-01-18 PROCEDURE — 97112 NEUROMUSCULAR REEDUCATION: CPT

## 2024-01-18 PROCEDURE — 97110 THERAPEUTIC EXERCISES: CPT

## 2024-01-18 PROCEDURE — 97140 MANUAL THERAPY 1/> REGIONS: CPT

## 2024-01-18 NOTE — PROGRESS NOTES
"Daily Note     Today's date: 2024  Patient name: Mukund Sam  : 2005  MRN: [id number]  Referring provider: Geovanny Soni DPM  Dx:   Encounter Diagnosis     ICD-10-CM    1. Tarsal coalition of left foot  Q66.89               Start Time: 1445  Stop Time: 1530  Total time in clinic (min): 45 minutes    Subjective: Patient reports his foot feels \"about the same\" today. Noted his heart rate was high today and he was light headed earlier but feels better now. Reports he has a headache and might be dehydrated.    Objective: See treatment diary below    HR: 130bpm pre and post session       Precautions: L subtalar fusion on 23     Manuals 12/21 12/26 12/28 1/2 1/4 1/8 1/11 1/15 1/18   L ankle PROM MK MK MK MK MK and toe ext MK MK and toe ext MK and toe ext MK   L ankle STM MK MK MK MK MK MK MK MK MK   L TC mobs MK MK MK MK MK and fibular AP MK MK and fibular AP MK MK and fibular    L ankle DF MWM   5\"x10 5\"x10 pink band 10\"x10 pink band 10\"x10 pink band      Neuro Re-Ed            Tandem balance             Functional heel raises         nv   L ankle eversion/inversion isometrics 5\"x10 10\"x10 5\"x10 5\"x10 5\"x10 5\"x10      SLS 3x30\" on foam blk 3x30\" on foam blk 3x30\" on foam blk   3x30\" on foam blk 3x30\" on foam blk 1x30\", 2x30\" on foam blk 1x30\", 2x30\" on foam blk   BIODEX            Ladder drills            Tandem walking            Wall sits            Eccentric lateral step tap  nv 6 in 2x10 4in 3x10  6 in 2x10 6 in 3x10 6 in 3x10 6 in 3x10   Towel scrunches            Toe yoga            Soleus heel raises 5x5 5x5 5x6 5x6 5x6 5x6  Stride stance 1x15 ea Stride stance 2x10 ea   Balance board 2x1' ea           Ther Ex            Bike 5' L6           TM  5 min 5 min 5 min 5 min 5 min 5 min 5 min 5 min   Standing soleus stretch   Trial p!  3x20\"       Standing gastroc stretch    Prostretch 3x20\"        Pistol squat   Trial       1x10 with SPC   Ankle 3-way            Heel raises B 2x10 10\" " "hold B 2x10 10\" hold B 2x10 10\" hold B 2x10 10\" hold B 2x10 10\" hold B 2x10 10\" hold B 5\" 3x10 B 5\" 3x10 B 5\" 3x10   Eccentric heel raises 2x10 on foam 2x10 on foam 2x10 on foam   1x15 on foam 1x15 2x10 2x10   Leg press LLE 105lb 3x10 LLE 115lb 3x10 LLE 115lb 3x10 LLE 125lb 3x10 LLE 135lb 3x10 LLE 135lb 3x10 LLE 145lb 3x10 LLE 155lb 3x10 LLE 165lb 1x15   Toe walking            HR on leg press L SL 55lb 3x10 L SL 65lb 2x10 L SL 65lb 2x10 L SL 75lb 3x10 L SL 75lb 3x10 L SL 75lb 3x10 L SL 75lb 3x10 L SL 75lb 3x10 (dc nv)    Ther Activity            High knee walk to heel off        nv 3 laps   [place] carry        Box 15lb 2 laps    Step downs            Obstacle course            Sit to stands   Staggered L behind 2x10 Staggered L behind 3x10 Staggered L behind 3x10 Staggered L behind 3x10  Staggered L behind 3x10 Staggered L behind 3x10   SL RDL            Lunges   On step nv With mob 1x10 ea 1x10 ea  1x10 ea 1x10 ea   Stairs            Gait Training            SPC  5 min          Outside / hard uneven surface 4 laps post     4 laps post 1 lap ea - normal, tandem, high knee     Modalities                                       Assessment: Tolerated treatment well. Patient demonstrated fatigue post treatment, exhibited good technique with therapeutic exercises, and would benefit from continued PT.   Most challenged with heel raises in L stride stance due to calf weakness. Most restrictions noted with fibular mobs but improved following. Improved L SL balance noted evident by addition of pistol squats with SPC for some UE assist. Monitored HR throughout session but remained steady but high. Educated patient to drink water and contact his MD if symptoms do not improve.      Plan: Continue per plan of care.      Lavern Morin  "

## 2024-01-22 ENCOUNTER — TELEPHONE (OUTPATIENT)
Dept: NUTRITION | Facility: HOSPITAL | Age: 19
End: 2024-01-22

## 2024-01-22 ENCOUNTER — OFFICE VISIT (OUTPATIENT)
Dept: PHYSICAL THERAPY | Facility: CLINIC | Age: 19
End: 2024-01-22
Payer: COMMERCIAL

## 2024-01-22 DIAGNOSIS — Q66.89 TARSAL COALITION OF LEFT FOOT: Primary | ICD-10-CM

## 2024-01-22 PROCEDURE — 97112 NEUROMUSCULAR REEDUCATION: CPT

## 2024-01-22 PROCEDURE — 97110 THERAPEUTIC EXERCISES: CPT

## 2024-01-22 PROCEDURE — 97140 MANUAL THERAPY 1/> REGIONS: CPT

## 2024-01-22 NOTE — PROGRESS NOTES
"Daily Note     Today's date: 2024  Patient name: Mukund Sam  : 2005  MRN: [id number]  Referring provider: Geovanny Soni DPM  Dx:   Encounter Diagnosis     ICD-10-CM    1. Tarsal coalition of left foot  Q66.89                 Start Time: 1400  Stop Time: 1445  Total time in clinic (min): 45 minutes    Subjective: Patient reports continued pain in his foot when walking but was able to ride the bike at the gym for 30 minutes without pain but with fatigue. Reports most difficulty with his balance when walking outside and navigating curbs.    Objective: See treatment diary below       Precautions: L subtalar fusion on 23     Manuals 1/8 1/11 1/15 1/18 1/22   L ankle PROM MK MK and toe ext MK and toe ext MK MK   L ankle STM MK MK MK MK MK   L TC mobs, distal tib/fib AP/PA mobs MK MK and fibular AP MK MK and fibular  MK   L ankle DF MWM 10\"x10 pink band       Neuro Re-Ed        Tandem balance         Functional heel raises    nv L 1x15   L ankle eversion/inversion isometrics 5\"x10       SLS 3x30\" on foam blk 3x30\" on foam blk 1x30\", 2x30\" on foam blk 1x30\", 2x30\" on foam blk 1x30\", 2x30\" on foam blk   BIODEX        Ladder drills        Tandem walking        Wall sits        Eccentric lateral step tap 6 in 2x10 6 in 3x10 6 in 3x10 6 in 3x10 6 in 3x10   Towel scrunches        Toe yoga        Soleus heel raises 5x6  Stride stance 1x15 ea Stride stance 2x10 ea Stride stance L forward 3x10   Balance board        Ther Ex        Bike        TM 5 min 5 min 5 min 5 min 5 min   Standing soleus stretch        Standing gastroc stretch        Pistol squat    1x10 with SPC 2x10   Ankle 3-way        Heel raises B 2x10 10\" hold B 5\" 3x10 B 5\" 3x10 B 5\" 3x10 B 5\" 3x10   Eccentric heel raises 1x15 on foam 1x15 2x10 2x10 2x10   Leg press LLE 135lb 3x10 LLE 145lb 3x10 LLE 155lb 3x10 LLE 165lb 1x15 LLE 165lb 2x10   Toe walking        HR on leg press L SL 75lb 3x10 L SL 75lb 3x10 L SL 75lb 3x10 (deyanira nv)     Ther " Activity        High knee walk to heel off   nv 3 laps P!   [place] carry   Box 15lb 2 laps     Step downs        Obstacle course        Sit to stands Staggered L behind 3x10  Staggered L behind 3x10 Staggered L behind 3x10    SL RDL        Lunges 1x10 ea  1x10 ea 1x10 ea 1x12 ea   Stairs        Gait Training        SPC        Outside / hard uneven surface 4 laps post 1 lap ea - normal, tandem, high knee   2 laps high knees, tandem   Modalities                           Assessment: Tolerated treatment well. Patient demonstrated fatigue post treatment, exhibited good technique with therapeutic exercises, and would benefit from continued PT. Focused manuals on improving L tib-fib joint mobility as this is where patient notes most pain when walking. Able to initiate L functional heel raises this session with good tolerance. Continuing to progress LLE strength and balance overall with increased fatigue and L calf shaking noted post-session.       Plan: Continue per plan of care.      Lavern Morin

## 2024-01-22 NOTE — TELEPHONE ENCOUNTER
Left VM regarding sports nutrition/fitness nutrition referral.   Call back number provided [phone]

## 2024-01-25 ENCOUNTER — OFFICE VISIT (OUTPATIENT)
Dept: PHYSICAL THERAPY | Facility: CLINIC | Age: 19
End: 2024-01-25
Payer: COMMERCIAL

## 2024-01-25 DIAGNOSIS — Q66.89 TARSAL COALITION OF LEFT FOOT: Primary | ICD-10-CM

## 2024-01-25 PROCEDURE — 97112 NEUROMUSCULAR REEDUCATION: CPT

## 2024-01-25 PROCEDURE — 97110 THERAPEUTIC EXERCISES: CPT

## 2024-01-25 PROCEDURE — 97140 MANUAL THERAPY 1/> REGIONS: CPT

## 2024-01-25 NOTE — PROGRESS NOTES
"Daily Note     Today's date: 2024  Patient name: Mukund Sam  : 2005  MRN: [id number]  Referring provider: Geovanny Soni DPM  Dx:   Encounter Diagnosis     ICD-10-CM    1. Tarsal coalition of left foot  Q66.89             Start Time: 1400  Stop Time: 1445  Total time in clinic (min): 45 minutes    Subjective: Patient reports more pain the last 2 days after being more active but is feeling better today due to not doing much yesterday and today. Notes hypersensitivity in his L lateral foot. Continues to note more pain after prolonged activity and walking on harder surfaces.    Objective: See treatment diary below       Precautions: L subtalar fusion on 23     Manuals 1/8 1/11 1/15 1/18 1/22 1/25   L ankle PROM MK MK and toe ext MK and toe ext MK MK MK   L ankle STM MK MK MK MK MK MK   L TC mobs, distal tib/fib AP/PA mobs MK MK and fibular AP MK MK and fibular  MK MK   L ankle DF MWM 10\"x10 pink band        Neuro Re-Ed         Tandem balance          Functional heel raises    nv L 1x15 P!   L ankle eversion/inversion isometrics 5\"x10        SLS 3x30\" on foam blk 3x30\" on foam blk 1x30\", 2x30\" on foam blk 1x30\", 2x30\" on foam blk 1x30\", 2x30\" on foam blk 1x30\", 3x30\" on foam blk   BIODEX         Ladder drills         Tandem walking         Wall sits         Eccentric lateral step tap 6 in 2x10 6 in 3x10 6 in 3x10 6 in 3x10 6 in 3x10 6 in 3x10   Towel scrunches         Toe yoga         Soleus heel raises 5x6  Stride stance 1x15 ea Stride stance 2x10 ea Stride stance L forward 3x10 Stride stance B 3x10   Balance board         Ther Ex         Bike         TM 5 min 5 min 5 min 5 min 5 min 5 min   Standing soleus stretch         Standing gastroc stretch         Pistol squat    1x10 with SPC 2x10 Hi/low 60cm 2x10 with SPC   Ankle 3-way         Heel raises B 2x10 10\" hold B 5\" 3x10 B 5\" 3x10 B 5\" 3x10 B 5\" 3x10 B 4x10   Eccentric heel raises 1x15 on foam 1x15 2x10 2x10 2x10    Leg press LLE " 135lb 3x10 LLE 145lb 3x10 LLE 155lb 3x10 LLE 165lb 1x15 LLE 165lb 2x10 LLE 185lb 2x10, 165lb 2x10   Toe walking         HR on leg press L SL 75lb 3x10 L SL 75lb 3x10 L SL 75lb 3x10 (dc nv)      Ther Activity         High knee walk to heel off   nv 3 laps P!    [place] carry   Box 15lb 2 laps      Step downs         Obstacle course         Sit to stands Staggered L behind 3x10  Staggered L behind 3x10 Staggered L behind 3x10     SL RDL         Lunges 1x10 ea  1x10 ea 1x10 ea 1x12 ea    Stairs         Gait Training         SPC         Outside / hard uneven surface 4 laps post 1 lap ea - normal, tandem, high knee   2 laps high knees, tandem (Inside) 4 laps ea tandem on foam, hurdles on foam; (outside) 3 laps tandem walking, 1 lap high knee with heel strike   Modalities                              Assessment: Tolerated treatment well. Patient demonstrated fatigue post treatment, exhibited good technique with therapeutic exercises, and would benefit from continued PT. Educated about desensitization techniques with soft blanket to his L lateral foot to help with hypersensitivity. Progressed dynamic balance this session with addition of tandem walking on foam and hurdles on foam. Progressed L plantarflexion strength within pain tolerance. Improved balance and pain levels with intermittent verbal cueing for L heel strike during gait and dynamic balance activities.       Plan: Continue per plan of care.      Lavern Morin

## 2024-01-29 ENCOUNTER — OFFICE VISIT (OUTPATIENT)
Dept: PHYSICAL THERAPY | Facility: CLINIC | Age: 19
End: 2024-01-29
Payer: COMMERCIAL

## 2024-01-29 DIAGNOSIS — Q66.89 TARSAL COALITION OF LEFT FOOT: Primary | ICD-10-CM

## 2024-01-29 PROCEDURE — 97112 NEUROMUSCULAR REEDUCATION: CPT | Performed by: PHYSICAL THERAPIST

## 2024-01-29 PROCEDURE — 97140 MANUAL THERAPY 1/> REGIONS: CPT | Performed by: PHYSICAL THERAPIST

## 2024-01-29 PROCEDURE — 97110 THERAPEUTIC EXERCISES: CPT | Performed by: PHYSICAL THERAPIST

## 2024-01-29 NOTE — PROGRESS NOTES
"Daily Note     Today's date: 2024  Patient name: Mukund Sam  : 2005  MRN: [id number]  Referring provider: Geovanny Soni DPM  Dx:   Encounter Diagnosis     ICD-10-CM    1. 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 Seems the desensitivity training is improving.     Objective: See treatment diary below       Precautions: L subtalar fusion on 23     Manuals 1/8 1/11 1/15 1/18 1/22 1/25 1/29   L ankle PROM MK MK and toe ext MK and toe ext MK MK MK MK   L ankle STM MK MK MK MK MK MK MK   L TC mobs, distal tib/fib AP/PA mobs MK MK and fibular AP MK MK and fibular  MK MK MK   L ankle DF MWM 10\"x10 pink band         Neuro Re-Ed          Tandem balance           Functional heel raises    nv L 1x15 P! Tandem stance 15x, L LE pain   L ankle eversion/inversion isometrics 5\"x10         SLS 3x30\" on foam blk 3x30\" on foam blk 1x30\", 2x30\" on foam blk 1x30\", 2x30\" on foam blk 1x30\", 2x30\" on foam blk 1x30\", 3x30\" on foam blk 3x30\" black foam    BIODEX          Ladder drills          Tandem walking          Wall sits          Eccentric lateral step tap 6 in 2x10 6 in 3x10 6 in 3x10 6 in 3x10 6 in 3x10 6 in 3x10 3x10 6\"    Towel scrunches          Toe yoga          Soleus heel raises 5x6  Stride stance 1x15 ea Stride stance 2x10 ea Stride stance L forward 3x10 Stride stance B 3x10 3x10 B stride stance    Balance board          Ther Ex          Bike          TM 5 min 5 min 5 min 5 min 5 min 5 min 5 min    Standing soleus stretch          Standing gastroc stretch          Pistol squat    1x10 with SPC 2x10 Hi/low 60cm 2x10 with SPC Hi/low 60cm 2x10 with SPC   Ankle 3-way          Heel raises B 2x10 10\" hold B 5\" 3x10 B 5\" 3x10 B 5\" 3x10 B 5\" 3x10 B 4x10 4x10 B    Eccentric heel raises 1x15 on foam 1x15 2x10 2x10 2x10     Leg press LLE 135lb 3x10 LLE 145lb 3x10 LLE 155lb 3x10 LLE 165lb 1x15 LLE 165lb 2x10 LLE 185lb 2x10, " 165lb 2x10 LLE 185lb 2x10   Toe walking          HR on leg press L SL 75lb 3x10 L SL 75lb 3x10 L SL 75lb 3x10 (dc nv)       Ther Activity          High knee walk to heel off   nv 3 laps P!     [place] carry   Box 15lb 2 laps       Step downs          Obstacle course          Sit to stands Staggered L behind 3x10  Staggered L behind 3x10 Staggered L behind 3x10      SL RDL          Lunges 1x10 ea  1x10 ea 1x10 ea 1x12 ea     Stairs          Gait Training          SPC          Outside / hard uneven surface 4 laps post 1 lap ea - normal, tandem, high knee   2 laps high knees, tandem (Inside) 4 laps ea tandem on foam, hurdles on foam; (outside) 3 laps tandem walking, 1 lap high knee with heel strike Inside) 4 laps ea tandem on foam, hurdles on foam; (outside) 3 laps tandem walking, 1 lap high knee with heel strike   Modalities                                 Assessment: Tolerated treatment well. Patient demonstrated fatigue post treatment, exhibited good technique with therapeutic exercises, and would benefit from continued PT.     Plan: Continue per plan of care.      Vick Hedrick

## 2024-01-31 ENCOUNTER — APPOINTMENT (OUTPATIENT)
Dept: LAB | Facility: CLINIC | Age: 19
End: 2024-01-31
Payer: COMMERCIAL

## 2024-01-31 DIAGNOSIS — R14.0 BLOATING: ICD-10-CM

## 2024-01-31 PROCEDURE — 86231 EMA EACH IG CLASS: CPT

## 2024-01-31 PROCEDURE — 86364 TISS TRNSGLTMNASE EA IG CLAS: CPT

## 2024-01-31 PROCEDURE — 36415 COLL VENOUS BLD VENIPUNCTURE: CPT

## 2024-01-31 PROCEDURE — 86258 DGP ANTIBODY EACH IG CLASS: CPT

## 2024-01-31 PROCEDURE — 86003 ALLG SPEC IGE CRUDE XTRC EA: CPT

## 2024-01-31 PROCEDURE — 82785 ASSAY OF IGE: CPT

## 2024-01-31 PROCEDURE — 82784 ASSAY IGA/IGD/IGG/IGM EACH: CPT

## 2024-02-01 ENCOUNTER — OFFICE VISIT (OUTPATIENT)
Dept: PHYSICAL THERAPY | Facility: CLINIC | Age: 19
End: 2024-02-01
Payer: COMMERCIAL

## 2024-02-01 ENCOUNTER — TELEPHONE (OUTPATIENT)
Dept: FAMILY MEDICINE CLINIC | Facility: CLINIC | Age: 19
End: 2024-02-01

## 2024-02-01 DIAGNOSIS — Q66.89 TARSAL COALITION OF LEFT FOOT: Primary | ICD-10-CM

## 2024-02-01 LAB
ALMOND IGE QN: <0.1 KUA/I
CASHEW NUT IGE QN: <0.1 KUA/I
CODFISH IGE QN: <0.1 KUA/I
EGG WHITE IGE QN: <0.1 KUA/I
ENDOMYSIUM IGA SER QL: NEGATIVE
GLIADIN PEPTIDE IGA SER-ACNC: 8 UNITS (ref 0–19)
GLIADIN PEPTIDE IGG SER-ACNC: 22 UNITS (ref 0–19)
GLUTEN IGE QN: <0.1 KUA/I
HAZELNUT IGE QN: <0.1 KUA/L
IGA SERPL-MCNC: 179 MG/DL (ref 90–386)
MILK IGE QN: <0.1 KUA/I
PEANUT IGE QN: <0.1 KUA/I
SALMON IGE QN: <0.1 KUA/I
SCALLOP IGE QN: <0.1 KUA/L
SESAME SEED IGE QN: <0.1 KUA/I
SHRIMP IGE QN: <0.1 KUA/L
SOYBEAN IGE QN: <0.1 KUA/I
TOTAL IGE SMQN RAST: 29.2 KU/L (ref 0–113)
TTG IGA SER-ACNC: <2 U/ML (ref 0–3)
TTG IGG SER-ACNC: 10 U/ML (ref 0–5)
TUNA IGE QN: <0.1 KUA/I
WALNUT IGE QN: <0.1 KUA/I
WHEAT IGE QN: <0.1 KUA/I

## 2024-02-01 PROCEDURE — 97110 THERAPEUTIC EXERCISES: CPT | Performed by: PHYSICAL THERAPIST

## 2024-02-01 PROCEDURE — 97140 MANUAL THERAPY 1/> REGIONS: CPT | Performed by: PHYSICAL THERAPIST

## 2024-02-01 NOTE — PROGRESS NOTES
"Daily Note     Today's date: 2024  Patient name: Mukund Sam  : 2005  MRN: [id number]  Referring provider: Geovanny Soni DPM  Dx:   Encounter Diagnosis     ICD-10-CM    1. 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     Objective: See treatment diary below       Precautions: L subtalar fusion on 23     Manuals 1/15 1/18 1/22 1/25 1/29 2/1   L ankle PROM MK and toe ext MK MK MK MK MK   L ankle STM MK MK MK MK MK MK   L TC mobs, distal tib/fib AP/PA mobs MK MK and fibular  MK MK MK MK   L ankle DF MWM         Neuro Re-Ed         Tandem balance          Functional heel raises  nv L 1x15 P! Tandem stance 15x, L LE pain -   L ankle eversion/inversion isometrics         SLS 1x30\", 2x30\" on foam blk 1x30\", 2x30\" on foam blk 1x30\", 2x30\" on foam blk 1x30\", 3x30\" on foam blk 3x30\" black foam  3x30\" black foam    BIODEX         Ladder drills         Tandem walking         Wall sits         Eccentric lateral step tap 6 in 3x10 6 in 3x10 6 in 3x10 6 in 3x10 3x10 6\"  3x10 6\"    Towel scrunches         Toe yoga         Soleus heel raises Stride stance 1x15 ea Stride stance 2x10 ea Stride stance L forward 3x10 Stride stance B 3x10 3x10 B stride stance  3x10 B stride stance    Balance board         Ther Ex         Bike         TM 5 min 5 min 5 min 5 min 5 min  5 min    Standing soleus stretch         Standing gastroc stretch         Pistol squat  1x10 with SPC 2x10 Hi/low 60cm 2x10 with SPC Hi/low 60cm 2x10 with SPC 2x10 59 cm w/ SPC   Ankle 3-way      Nv    Heel raises B 5\" 3x10 B 5\" 3x10 B 5\" 3x10 B 4x10 4x10 B  2x10 ecc    Eccentric heel raises 2x10 2x10 2x10      Leg press LLE 155lb 3x10 LLE 165lb 1x15 LLE 165lb 2x10 LLE 185lb 2x10, 165lb 2x10 LLE 185lb 2x10 2x12 185 lbs    Toe walking         HR on leg press L SL 75lb 3x10 (dc nv)        Ther " Activity         High knee walk to heel off nv 3 laps P!      [place] carry Box 15lb 2 laps        Step downs         Obstacle course         Sit to stands Staggered L behind 3x10 Staggered L behind 3x10       SL RDL         Lunges 1x10 ea 1x10 ea 1x12 ea      Stairs         Gait Training         SPC         Outside / hard uneven surface   2 laps high knees, tandem (Inside) 4 laps ea tandem on foam, hurdles on foam; (outside) 3 laps tandem walking, 1 lap high knee with heel strike Inside) 4 laps ea tandem on foam, hurdles on foam; (outside) 3 laps tandem walking, 1 lap high knee with heel strike Inside) 4 laps ea tandem on foam, hurdles on foam; (outside) 3 laps tandem walking, 1 lap high knee with heel strike   Modalities                              Assessment: Tolerated treatment well. Patient demonstrated fatigue post treatment, exhibited good technique with therapeutic exercises, and would benefit from continued PT.     Plan: Continue per plan of care.      Vick Hedrick

## 2024-02-02 NOTE — TELEPHONE ENCOUNTER
----- Message from ARA Villarreal sent at 2/1/2024  6:35 PM EST -----  Patient does appear to have a gluten intolerance but not celiac disease.  I would recommend that he eliminate gluten from his diet and see if this improves his GI symptoms.  Patient's food allergy profile was normal.

## 2024-02-05 ENCOUNTER — OFFICE VISIT (OUTPATIENT)
Dept: PHYSICAL THERAPY | Facility: CLINIC | Age: 19
End: 2024-02-05
Payer: COMMERCIAL

## 2024-02-05 DIAGNOSIS — Q66.89 TARSAL COALITION OF LEFT FOOT: Primary | ICD-10-CM

## 2024-02-05 PROCEDURE — 97110 THERAPEUTIC EXERCISES: CPT

## 2024-02-05 PROCEDURE — 97112 NEUROMUSCULAR REEDUCATION: CPT

## 2024-02-05 PROCEDURE — 97140 MANUAL THERAPY 1/> REGIONS: CPT

## 2024-02-05 NOTE — PROGRESS NOTES
"Daily Note     Today's date: 2024  Patient name: Mukund Sam  : 2005  MRN: [id number]  Referring provider: Geovanny Soni DPM  Dx:   Encounter Diagnosis     ICD-10-CM    1. 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 Notes most pain with prolonged walking located in his posterior ankle/calcaneus.    Objective: See treatment diary below       Precautions: L subtalar fusion on 23     Updated HEP: heel raises, eccentric heel raises, stride stance heel raises, SL balance, tandem walking    Manuals 1/15 1/18 1/22 1/25 1/29 2/1 2/5   L ankle PROM MK and toe ext MK MK MK MK MK MK   L ankle STM MK MK MK MK MK MK MK   L TC mobs, distal tib/fib AP/PA mobs MK MK and fibular  MK MK MK MK MK   L ankle DF MWM          Neuro Re-Ed          Tandem balance           Functional heel raises  nv L 1x15 P! Tandem stance 15x, L LE pain -    L ankle eversion/inversion isometrics          SLS 1x30\", 2x30\" on foam blk 1x30\", 2x30\" on foam blk 1x30\", 2x30\" on foam blk 1x30\", 3x30\" on foam blk 3x30\" black foam  3x30\" black foam  Ball toss 1x10, blk foam 2x45\"   BIODEX          Ladder drills          Tandem walking          Wall sits          Eccentric lateral step tap 6 in 3x10 6 in 3x10 6 in 3x10 6 in 3x10 3x10 6\"  3x10 6\"  3x10 6\"   Short foot       Standing 5\"x20   Toe yoga          Soleus heel raises Stride stance 1x15 ea Stride stance 2x10 ea Stride stance L forward 3x10 Stride stance B 3x10 3x10 B stride stance  3x10 B stride stance  3x10 B stride stance    Balance board          Ther Ex          Bike          TM 5 min 5 min 5 min 5 min 5 min  5 min  5 min   Standing soleus stretch          Standing gastroc stretch          Pistol squat  1x10 with SPC 2x10 Hi/low 60cm 2x10 with SPC Hi/low 60cm 2x10 with SPC 2x10 59 " "cm w/ SPC 2x10 59 cm w/ SPC   Ankle 2-way (inv/ev)      Nv  GTB 2x10 ea   Heel raises B 5\" 3x10 B 5\" 3x10 B 5\" 3x10 B 4x10 4x10 B  2x10 ecc  4x10 B    Eccentric heel raises 2x10 2x10 2x10    2x10   Leg press LLE 155lb 3x10 LLE 165lb 1x15 LLE 165lb 2x10 LLE 185lb 2x10, 165lb 2x10 LLE 185lb 2x10 2x12 185 lbs     Toe walking          HR on leg press L SL 75lb 3x10 (dc nv)         Ther Activity          High knee walk to heel off nv 3 laps P!       [place] carry Box 15lb 2 laps         Step downs          Obstacle course          Sit to stands Staggered L behind 3x10 Staggered L behind 3x10        SL RDL          Lunges 1x10 ea 1x10 ea 1x12 ea       Stairs          Gait Training          SPC          Outside / hard uneven surface   2 laps high knees, tandem (Inside) 4 laps ea tandem on foam, hurdles on foam; (outside) 3 laps tandem walking, 1 lap high knee with heel strike Inside) 4 laps ea tandem on foam, hurdles on foam; (outside) 3 laps tandem walking, 1 lap high knee with heel strike Inside) 4 laps ea tandem on foam, hurdles on foam; (outside) 3 laps tandem walking, 1 lap high knee with heel strike (Inside) 4 laps ea - tandem on foam, hurdles; (outside) 3 laps - tandem walking with heel strike   Modalities                                 Assessment: Tolerated treatment well. Patient demonstrated fatigue post treatment, exhibited good technique with therapeutic exercises, and would benefit from continued PT. Focused on L ankle strength and stability training to further support L ankle with challenge and fatigue noted. Most challenge with addition of SL ball toss due to decreased L ankle strength leading to balance deficits. Also re-added foot intrinsic strengthening to further support the ankle especially when walking on harder surfaces.    Plan: Continue per plan of care.      Lavern Morin  "

## 2024-02-08 ENCOUNTER — OFFICE VISIT (OUTPATIENT)
Dept: PHYSICAL THERAPY | Facility: CLINIC | Age: 19
End: 2024-02-08
Payer: COMMERCIAL

## 2024-02-08 DIAGNOSIS — Q66.89 TARSAL COALITION OF LEFT FOOT: Primary | ICD-10-CM

## 2024-02-08 PROCEDURE — 97112 NEUROMUSCULAR REEDUCATION: CPT

## 2024-02-08 PROCEDURE — 97140 MANUAL THERAPY 1/> REGIONS: CPT

## 2024-02-08 PROCEDURE — 97110 THERAPEUTIC EXERCISES: CPT

## 2024-02-08 NOTE — PROGRESS NOTES
"Daily Note     Today's date: 2024  Patient name: Mukund Sam  : 2005  MRN: [id number]  Referring provider: Geovanny Soni DPM  Dx:   Encounter Diagnosis     ICD-10-CM    1. 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    Objective: See treatment diary below       Precautions: L subtalar fusion on 23     Updated HEP: heel raises, eccentric heel raises, stride stance heel raises, SL balance, tandem walking, ankle 2-way    Manuals 1/15 1/18 1/22 1/25 1/29 2/1 2/5 2/8   L ankle PROM MK and toe ext MK MK MK MK MK MK MK   L ankle STM MK MK MK MK MK MK MK MK   L TC mobs, distal tib/fib AP/PA mobs MK MK and fibular  MK MK MK MK MK MK   L ankle DF MWM           Neuro Re-Ed           Tandem balance            Functional heel raises  nv L 1x15 P! Tandem stance 15x, L LE pain -     L ankle eversion/inversion isometrics           SLS 1x30\", 2x30\" on foam blk 1x30\", 2x30\" on foam blk 1x30\", 2x30\" on foam blk 1x30\", 3x30\" on foam blk 3x30\" black foam  3x30\" black foam  Ball toss 1x10, blk foam 2x45\" Ball toss 2x10, blk foam 2x45\"   BIODEX           Ladder drills           Tandem walking           Wall sits           Eccentric lateral step tap 6 in 3x10 6 in 3x10 6 in 3x10 6 in 3x10 3x10 6\"  3x10 6\"  3x10 6\" 3x10 6\"   Short foot       Standing 5\"x20 Standing 5\"x20   Toe yoga           Soleus heel raises Stride stance 1x15 ea Stride stance 2x10 ea Stride stance L forward 3x10 Stride stance B 3x10 3x10 B stride stance  3x10 B stride stance  3x10 B stride stance  3x10 B stride stance    Balance board           Ther Ex           Bike           TM 5 min 5 min 5 min 5 min 5 min  5 min  5 min 5 min   Standing soleus stretch           Standing gastroc stretch           Pistol squat  1x10 with SPC 2x10 Hi/low 60cm 2x10 with SPC Hi/low 60cm 2x10 " "with SPC 2x10 59 cm w/ SPC 2x10 59 cm w/ SPC 2x10 59 cm w/ SPC   Ankle 2-way (inv/ev)      Nv  GTB 2x10 ea GTB 2x10 ea   Heel raises B 5\" 3x10 B 5\" 3x10 B 5\" 3x10 B 4x10 4x10 B  2x10 ecc  4x10 B     Eccentric heel raises 2x10 2x10 2x10    2x10 2x10   Leg press LLE 155lb 3x10 LLE 165lb 1x15 LLE 165lb 2x10 LLE 185lb 2x10, 165lb 2x10 LLE 185lb 2x10 2x12 185 lbs      Toe walking           HR on leg press L SL 75lb 3x10 (dc nv)          Ther Activity           High knee walk to heel off nv 3 laps P!        [place] carry Box 15lb 2 laps          Step downs           Obstacle course           Sit to stands Staggered L behind 3x10 Staggered L behind 3x10         SL RDL           Lunges 1x10 ea 1x10 ea 1x12 ea        Stairs           Gait Training           SPC           Outside / hard uneven surface   2 laps high knees, tandem (Inside) 4 laps ea tandem on foam, hurdles on foam; (outside) 3 laps tandem walking, 1 lap high knee with heel strike Inside) 4 laps ea tandem on foam, hurdles on foam; (outside) 3 laps tandem walking, 1 lap high knee with heel strike Inside) 4 laps ea tandem on foam, hurdles on foam; (outside) 3 laps tandem walking, 1 lap high knee with heel strike (Inside) 4 laps ea - tandem on foam, hurdles; (outside) 3 laps - tandem walking with heel strike (Inside) 4 laps - tandem on foam; 3 laps - tandem walking with heel strike   Modalities                                    Assessment: Tolerated treatment well. Patient demonstrated fatigue post treatment, exhibited good technique with therapeutic exercises, and would benefit from continued PT. Improved performance noted with eccentric heel raises this session demonstrating improvements in LLE strength. Continues to demonstrate decreased LLE muscular endurance which leads to increased pain levels.    Plan: Continue per plan of care.  Plan to decrease frequency to 1x/week.    Lavern Morin  "

## 2024-02-12 ENCOUNTER — OFFICE VISIT (OUTPATIENT)
Dept: PHYSICAL THERAPY | Facility: CLINIC | Age: 19
End: 2024-02-12
Payer: COMMERCIAL

## 2024-02-12 DIAGNOSIS — Q66.89 TARSAL COALITION OF LEFT FOOT: Primary | ICD-10-CM

## 2024-02-12 PROCEDURE — 97140 MANUAL THERAPY 1/> REGIONS: CPT

## 2024-02-12 PROCEDURE — 97110 THERAPEUTIC EXERCISES: CPT

## 2024-02-12 PROCEDURE — 97112 NEUROMUSCULAR REEDUCATION: CPT

## 2024-02-12 NOTE — PROGRESS NOTES
"Daily Note     Today's date: 2024  Patient name: Mukund Sam  : 2005  MRN: [id number]  Referring provider: Geovanny Soni DPM  Dx:   Encounter Diagnosis     ICD-10-CM    1. 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    Objective: See treatment diary below       Precautions: L subtalar fusion on 23     Updated HEP: heel raises, eccentric heel raises, stride stance heel raises, SL balance, tandem walking, ankle 2-way    Manuals    L ankle PROM MK MK MK MK MK   L ankle STM MK MK MK MK MK   L TC mobs, distal tib/fib AP/PA mobs MK [facility] MK   L ankle DF MWM        Neuro Re-Ed        Tandem balance         Functional heel raises Tandem stance 15x, L LE pain -      L ankle eversion/inversion isometrics        SLS 3x30\" black foam  3x30\" black foam  Ball toss 1x10, blk foam 2x45\" Ball toss 2x10, blk foam 2x45\" Ball toss 2x10, blk foam 2x60\", 2x30\" board ea   BIODEX        Ladder drills        Tandem walking        Wall sits        Eccentric lateral step tap 3x10 6\"  3x10 6\"  3x10 6\" 3x10 6\" 3x10 6\"   Short foot   Standing 5\"x20 Standing 5\"x20 Standing 5\"x20   Toe yoga        Soleus heel raises 3x10 B stride stance  3x10 B stride stance  3x10 B stride stance  3x10 B stride stance  3x10 B stride stance    Balance board        Ther Ex        Bike        TM 5 min  5 min  5 min 5 min 5 min   Standing soleus stretch        Standing gastroc stretch        Pistol squat Hi/low 60cm 2x10 with SPC 2x10 59 cm w/ SPC 2x10 59 cm w/ SPC 2x10 59 cm w/ SPC 2x10 59 cm w/ SPC   Ankle 2-way (inv/ev)  Nv  GTB 2x10 ea GTB 2x10 ea BTB 3x10 ea   Heel raises 4x10 B  2x10 ecc  4x10 B      Eccentric heel raises   2x10 2x10 3x10   Leg press LLE 185lb 2x10 2x12 185 lbs       Toe walking        HR on leg press      "   Ther Activity        High knee walk to heel off        [place] carry        Step up/ downs     8 in 2x10 ea   Obstacle course        Sit to stands        SL RDL        Lunges        Stairs        Gait Training        SPC        Outside / hard uneven surface Inside) 4 laps ea tandem on foam, hurdles on foam; (outside) 3 laps tandem walking, 1 lap high knee with heel strike Inside) 4 laps ea tandem on foam, hurdles on foam; (outside) 3 laps tandem walking, 1 lap high knee with heel strike (Inside) 4 laps ea - tandem on foam, hurdles; (outside) 3 laps - tandem walking with heel strike (Inside) 4 laps - tandem on foam; 3 laps - tandem walking with heel strike (Inside) 4 laps - tandem on foam; (outside) curb 2x10, 2 laps tandem walking   Modalities                           Assessment: Tolerated treatment well. Patient demonstrated fatigue post treatment, exhibited good technique with therapeutic exercises, and would benefit from continued PT. Able to increase reps with eccentric heel raises and ankle 2-ways demonstrating improved LLE endurance. Most challenge with SL ball toss with RLE assist every 1-2 reps due to decreased stability. Cueing required for L heel strike during tandem walking and curb navigation.    Plan: Continue per plan of care.  Plan to decrease frequency to 1x/week.    Lavern Morin

## 2024-02-15 ENCOUNTER — APPOINTMENT (OUTPATIENT)
Dept: PHYSICAL THERAPY | Facility: CLINIC | Age: 19
End: 2024-02-15
Payer: COMMERCIAL

## 2024-02-19 ENCOUNTER — OFFICE VISIT (OUTPATIENT)
Dept: PHYSICAL THERAPY | Facility: CLINIC | Age: 19
End: 2024-02-19
Payer: COMMERCIAL

## 2024-02-19 DIAGNOSIS — Q66.89 TARSAL COALITION OF LEFT FOOT: Primary | ICD-10-CM

## 2024-02-19 PROCEDURE — 97112 NEUROMUSCULAR REEDUCATION: CPT

## 2024-02-19 PROCEDURE — 97140 MANUAL THERAPY 1/> REGIONS: CPT

## 2024-02-19 PROCEDURE — 97110 THERAPEUTIC EXERCISES: CPT

## 2024-02-19 NOTE — PROGRESS NOTES
"Daily Note     Today's date: 2024  Patient name: Mukund Sam  : 2005  MRN: [id number]  Referring provider: Geovanny Soni DPM  Dx:   Encounter Diagnosis     ICD-10-CM    1. 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    Objective: See treatment diary below       Precautions: L subtalar fusion on 23     Updated HEP: heel raises, eccentric heel raises, stride stance heel raises, SL balance, tandem walking, ankle 2-way, pistol squat    Manuals    L ankle PROM MK MK MK MK MK    L ankle STM MK MK MK MK MK MK   L TC mobs, distal tib/fib AP/PA mobs MK MK MK MK MK    L ankle DF MWM         Neuro Re-Ed         Tandem balance          Functional heel raises Tandem stance 15x, L LE pain -       L ankle eversion/inversion isometrics         SLS 3x30\" black foam  3x30\" black foam  Ball toss 1x10, blk foam 2x45\" Ball toss 2x10, blk foam 2x45\" Ball toss 2x10, blk foam 2x60\", 2x30\" board ea Ball toss 2x10, blk foam 2x60\"    BIODEX         Ladder drills         Tandem walking         Wall sits         Eccentric lateral step tap 3x10 6\"  3x10 6\"  3x10 6\" 3x10 6\" 3x10 6\" 3x10 6\"   Short foot   Standing 5\"x20 Standing 5\"x20 Standing 5\"x20 Standing 5\"x20   Toe yoga         Soleus heel raises 3x10 B stride stance  3x10 B stride stance  3x10 B stride stance  3x10 B stride stance  3x10 B stride stance  3x10 B stride stance    Balance board         Ther Ex         Bike         TM 5 min  5 min  5 min 5 min 5 min 5 min   Standing soleus stretch         Standing gastroc stretch         Pistol squat Hi/low 60cm 2x10 with SPC 2x10 59 cm w/ SPC 2x10 59 cm w/ SPC 2x10 59 cm w/ SPC 2x10 59 cm w/ SPC 2x10 59 cm w/ SPC   Ankle 2-way (inv/ev)  Nv  GTB 2x10 ea GTB 2x10 ea BTB 3x10 ea BTB 3x10 " ea   Heel raises 4x10 B  2x10 ecc  4x10 B       Eccentric heel raises   2x10 2x10 3x10 3x10   Leg press LLE 185lb 2x10 2x12 185 lbs        Toe walking         HR on leg press         Ther Activity         High knee walk to heel off         [place] carry         Step up/ downs     8 in 2x10 ea 8 in 2x10 ea   Obstacle course         Sit to stands         SL RDL         Lunges         Stairs         Gait Training         SPC         Outside / hard uneven surface Inside) 4 laps ea tandem on foam, hurdles on foam; (outside) 3 laps tandem walking, 1 lap high knee with heel strike Inside) 4 laps ea tandem on foam, hurdles on foam; (outside) 3 laps tandem walking, 1 lap high knee with heel strike (Inside) 4 laps ea - tandem on foam, hurdles; (outside) 3 laps - tandem walking with heel strike (Inside) 4 laps - tandem on foam; 3 laps - tandem walking with heel strike (Inside) 4 laps - tandem on foam; (outside) curb 2x10, 2 laps tandem walking (Inside) 4 laps ea - tandem on foam, side stepping on foam; (outside) 3 laps - tandem walking with heel strike   Modalities                              Assessment: Tolerated treatment well. Patient demonstrated fatigue post treatment, exhibited good technique with therapeutic exercises, and would benefit from continued PT. Reviewed HEP with good understanding noted and cueing required for band placement with resisted EV/INV. Added pistol squat with SPC to HEP. Continued challenge with single leg balance due to decreased LLE stability but improved performance during SL ball toss. Added side stepping on foam to further challenge LLE stability with fatigue and appropriate challenge noted.    Plan: Continue per plan of care.      Lavern Morin

## 2024-02-22 ENCOUNTER — APPOINTMENT (OUTPATIENT)
Dept: PHYSICAL THERAPY | Facility: CLINIC | Age: 19
End: 2024-02-22
Payer: COMMERCIAL

## 2024-02-26 ENCOUNTER — OFFICE VISIT (OUTPATIENT)
Dept: PHYSICAL THERAPY | Facility: CLINIC | Age: 19
End: 2024-02-26
Payer: COMMERCIAL

## 2024-02-26 DIAGNOSIS — Q66.89 TARSAL COALITION OF LEFT FOOT: Primary | ICD-10-CM

## 2024-02-26 PROCEDURE — 97112 NEUROMUSCULAR REEDUCATION: CPT

## 2024-02-26 PROCEDURE — 97140 MANUAL THERAPY 1/> REGIONS: CPT

## 2024-02-26 PROCEDURE — 97110 THERAPEUTIC EXERCISES: CPT

## 2024-02-26 NOTE — PROGRESS NOTES
"Daily Note     Today's date: 2024  Patient name: Mukund Sam  : 2005  MRN: [id number]  Referring provider: Geovanny Soni DPM  Dx:   Encounter Diagnosis     ICD-10-CM    1. Tarsal coalition of left foot  Q66.89             Start Time: 1400  Stop Time: 1445  Total time in clinic (min): 45 minutes    Subjective: Patient reports he was able to walk his aunts dog outside in the yard yesterday without his cane which went well. Reports he had pain after this and less pain today. Reports he feels like he is making improvements in his balance and is not using his cane as much unless fatigued. Reports more pain when his leg is fatigued and his muscles are shaking.    Objective: See treatment diary below       Precautions: L subtalar fusion on 23     Updated HEP: heel raises, eccentric heel raises, stride stance heel raises, SL balance, tandem walking, ankle 2-way, pistol squat    Manuals    L ankle PROM MK MK MK MK MK MK MK   L ankle STM MK MK MK MK MK MK MK   L TC mobs MK MK MK MK MK  MK   L ankle DF MWM          Neuro Re-Ed          Tandem balance           Functional heel raises Tandem stance 15x, L LE pain -        L ankle eversion/inversion isometrics          SLS 3x30\" black foam  3x30\" black foam  Ball toss 1x10, blk foam 2x45\" Ball toss 2x10, blk foam 2x45\" Ball toss 2x10, blk foam 2x60\", 2x30\" board ea Ball toss 2x10, blk foam 2x60\"  Ball toss 2x10, blk foam 2x60\"    BIODEX          Ladder drills          Tandem walking          Wall sits          Eccentric lateral step tap 3x10 6\"  3x10 6\"  3x10 6\" 3x10 6\" 3x10 6\" 3x10 6\" 3x10 6\"   Short foot   Standing 5\"x20 Standing 5\"x20 Standing 5\"x20 Standing 5\"x20 Standing 10\"x10   Toe yoga          Soleus heel raises 3x10 B stride stance  3x10 B stride stance  3x10 B stride stance  3x10 B stride stance  3x10 B stride stance  3x10 B stride stance  3x10 B stride stance    Balance board          Ther Ex          Bike "          TM 5 min  5 min  5 min 5 min 5 min 5 min 5 min   Standing soleus stretch          Standing gastroc stretch          Pistol squat Hi/low 60cm 2x10 with SPC 2x10 59 cm w/ SPC 2x10 59 cm w/ SPC 2x10 59 cm w/ SPC 2x10 59 cm w/ SPC 2x10 59 cm w/ SPC    Ankle 2-way (inv/ev)  Nv  GTB 2x10 ea GTB 2x10 ea BTB 3x10 ea BTB 3x10 ea BTB 3x10 ea   Heel raises 4x10 B  2x10 ecc  4x10 B        Eccentric heel raises   2x10 2x10 3x10 3x10 3x10   Leg press LLE 185lb 2x10 2x12 185 lbs         Toe walking          HR on leg press          Ther Activity          High knee walk to heel off          [place] carry          Step up/ downs     8 in 2x10 ea 8 in 2x10 ea 8 in 2x10 ea   Obstacle course          Sit to stands          SL RDL          Lunges          Stairs          Gait Training          SPC          Outside / hard uneven surface Inside) 4 laps ea tandem on foam, hurdles on foam; (outside) 3 laps tandem walking, 1 lap high knee with heel strike Inside) 4 laps ea tandem on foam, hurdles on foam; (outside) 3 laps tandem walking, 1 lap high knee with heel strike (Inside) 4 laps ea - tandem on foam, hurdles; (outside) 3 laps - tandem walking with heel strike (Inside) 4 laps - tandem on foam; 3 laps - tandem walking with heel strike (Inside) 4 laps - tandem on foam; (outside) curb 2x10, 2 laps tandem walking (Inside) 4 laps ea - tandem on foam, side stepping on foam; (outside) 3 laps - tandem walking with heel strike (Inside) 4 laps ea - tandem on foam, side stepping on foam; (outside) 4 laps - tandem walking with heel strike   Modalities                                 Assessment: Tolerated treatment well. Patient demonstrated fatigue post treatment, exhibited good technique with therapeutic exercises, and would benefit from continued PT. Able to increase SL ball toss to 3 catches in a row demonstrating improved L single leg stability. Also improved SL stability noted during SL on foam and eccentric heel taps. Reviewed band  placement with ankle 2 way for HEP with improved understanding noted following. Discussed beginning walking program in his house starting at 2 minute increments, 5 times a day, then gradually progressing the time to improve his walking tolerance and endurance.    Plan: Continue per plan of care.      Lavern Morin

## 2024-02-29 ENCOUNTER — APPOINTMENT (OUTPATIENT)
Dept: PHYSICAL THERAPY | Facility: CLINIC | Age: 19
End: 2024-02-29
Payer: COMMERCIAL

## 2024-03-04 ENCOUNTER — APPOINTMENT (OUTPATIENT)
Dept: PHYSICAL THERAPY | Facility: CLINIC | Age: 19
End: 2024-03-04
Payer: COMMERCIAL

## 2024-03-05 ENCOUNTER — APPOINTMENT (OUTPATIENT)
Dept: PHYSICAL THERAPY | Facility: CLINIC | Age: 19
End: 2024-03-05
Payer: COMMERCIAL

## 2024-03-06 ENCOUNTER — HOSPITAL ENCOUNTER (EMERGENCY)
Facility: HOSPITAL | Age: 19
Discharge: HOME | End: 2024-03-06
Attending: EMERGENCY MEDICINE
Payer: COMMERCIAL

## 2024-03-06 VITALS
DIASTOLIC BLOOD PRESSURE: 80 MMHG | OXYGEN SATURATION: 99 % | TEMPERATURE: 98.2 F | RESPIRATION RATE: 16 BRPM | WEIGHT: 153.66 LBS | SYSTOLIC BLOOD PRESSURE: 150 MMHG | BODY MASS INDEX: 24.8 KG/M2 | HEART RATE: 91 BPM

## 2024-03-06 DIAGNOSIS — K59.00 CONSTIPATION: ICD-10-CM

## 2024-03-06 DIAGNOSIS — K64.9 HEMORRHOIDS: Primary | ICD-10-CM

## 2024-03-06 PROCEDURE — 99283 EMERGENCY DEPT VISIT LOW MDM: CPT

## 2024-03-06 PROCEDURE — 99282 EMERGENCY DEPT VISIT SF MDM: CPT

## 2024-03-07 NOTE — ED PROVIDER NOTES
History  Chief Complaint   Patient presents with    Hemorrhoids     Patient reports a lump by his anus, reports noticed it 3 days ago. Reports painful in certain sitting positions, some blood with wiping. Patient reports thought it was a pimple at first and tried to pop it. Patient also reports recent constipation.      18 old male with no reported past medical history other than chronic constipation presenting to the ED with complaints of a bump on the external anus.  States he first noticed the bump about 3 days ago.  The area is painful, tender to the touch.  Is not [employer] getting bigger.  He initially thought it was a pimple so he tried to pop it however there is no purulence.  He does have a significant history of constipation and when she passes small, hard stools.  He did try taking MiraLAX over the last week but no significant change in his bowel movements.  He does intermittently feel he has abdominal distention and bloating but no complaints of this today no abdominal pain.  He did notice some very small amount of blood on the toilet paper over the last 2 days but he has no associated melena, BRBPR, blood in the toilet, blood mixed with the stool or any other acute complaints of bleeding.  He is otherwise urinating normally.  He is try to stay well-hydrated.  He has not take anything else for his constipation.  He did have a history of hemorrhoids.  Denies any other complaints today reports that he has otherwise been well.  Specifically denies fever, chills, abdominal pain, nausea, vomiting, urinary complaints, headache, confusion, weakness, rash or any other specific complaint.          Prior to Admission Medications   Prescriptions Last Dose Informant Patient Reported? Taking?   Multiple Vitamins-Minerals (MULTI-VITAMIN GUMMIES PO)   Yes No   Sig: Take by mouth   VITAMIN D, CHOLECALCIFEROL, PO   Yes No   Sig: Take by mouth   aspirin 325 mg tablet   No No   Sig: Take 1 tablet (325 mg total) by mouth daily    b complex vitamins capsule   Yes No   Sig: Take 1 capsule by mouth daily      Facility-Administered Medications: None       Past Medical History:   Diagnosis Date    Anxiety     Autism 2004    Dr. 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 BMI 99.3% at 11 year well. Has not had anything other than water yet today so with send for obesity lab panel, will be a fasting sample.    Verbal apraxia 2004    Dr. Panchal        Past Surgical History:   Procedure Laterality Date    NO PAST SURGERIES      [place] ARTHRODESIS SUBTALAR Left 7/31/2023    Procedure: ARTHRODESIS / FUSION SUBTALAR JOINT;  Surgeon: Geovanny Soni DPM;  Location:  MAIN OR;  Service: Podiatry       Family History   Problem Relation Age of Onset    Learning disabilities Mother         Reading as a child    Obesity Mother     Diabetes Mother     Bipolar disorder Father     Depression Father     Anxiety disorder Maternal Grandmother     Obesity Maternal Grandmother     Depression Maternal Grandfather     Obesity Maternal Grandfather     Obesity Paternal Grandmother     Cancer Paternal Grandmother     Cancer Paternal Grandfather     Anxiety disorder Maternal Aunt     Bipolar disorder Maternal Aunt     Depression Maternal Aunt     Developmental delay Maternal Aunt         Did not talk until 2 years of age    Obesity Paternal Aunt     Depression Cousin     Obesity Maternal Aunt     Schizoaffective Disorder  Other     Breast cancer additional onset Other      I have reviewed and agree with the history as documented.    E-Cigarette/Vaping    E-Cigarette Use Never User      E-Cigarette/Vaping Substances    Nicotine No     THC No     CBD No     Flavoring No     Other No     Unknown No      Social History     Tobacco Use    Smoking status: Never    Smokeless  tobacco: Never   Vaping Use    Vaping status: Never Used   Substance Use Topics    Alcohol use: Never    Drug use: Never       Review of Systems   Gastrointestinal:  Positive for abdominal distention (Intermittently, none now), anal bleeding, constipation and rectal pain. Negative for abdominal pain, blood in stool, diarrhea and nausea.       Physical Exam  Physical Exam  Vitals and nursing note reviewed.   Constitutional:       General: He is not in acute distress.     Appearance: He is well-developed.      Comments: Awake, alert, interactive and resting in the stretcher in no acute distress.  Patient is not ill or toxic appearing.     HENT:      Head: Normocephalic and atraumatic.   Eyes:      Conjunctiva/sclera: Conjunctivae normal.   Cardiovascular:      Rate and Rhythm: Normal rate and regular rhythm.      Heart sounds: No murmur heard.  Pulmonary:      Effort: Pulmonary effort is normal. No respiratory distress.      Breath sounds: Normal breath sounds.   Abdominal:      Palpations: Abdomen is soft.      Tenderness: There is no abdominal tenderness.      Comments: Soft, nontender and nondistended.   Genitourinary:     Comments: 1 cm apparent external hemorrhoid.  There is a speck of apparent thrombosis, < 1mm.  Otherwise hemorrhoid is soft without any other discoloration.  No active bleeding or dried blood around the external anus.  JESUS with no masses.  No anal fissures appreciated.  Musculoskeletal:         General: No swelling.      Cervical back: Neck supple.   Skin:     General: Skin is warm and dry.      Capillary Refill: Capillary refill takes less than 2 seconds.   Neurological:      Mental Status: He is alert.   Psychiatric:         Mood and Affect: Mood normal.         Vital Signs  ED Triage Vitals [03/06/24 1811]   Temperature Pulse Respirations Blood Pressure SpO2   98.2 °F (36.8 °C) 91 16 150/80 99 %      Temp Source Heart Rate Source Patient Position - Orthostatic VS BP Location FiO2 (%)   Oral  Monitor Sitting Right arm --      Pain Score       3           Vitals:    03/06/24 1811   BP: 150/80   Pulse: 91   Patient Position - Orthostatic VS: Sitting         Visual Acuity      ED Medications  Medications - No data to display    Diagnostic Studies  Results Reviewed       None                   No orders to display              Procedures  Procedures         ED Course                                             Medical Decision Making  18-year-old male presents ED with a bump on the external anus for about 3 days.  The area is tender.  He has had noticed some associated minimal amount of bright red blood on the toilet paper.  He does have a history of chronic constipation.  No other complaints today.  On exam patient is otherwise very well-appearing.  VSS.  He does have an apparent about 1 cm external hemorrhoid with a speck of thrombosis.  There is otherwise no other discoloration, bleeding or any other associated findings on PE.  Findings not consistent with abscess/cellulitis. His abdomen is otherwise soft, nondistended and nontender.  Do not believe patient requires excision of the small area of thrombosis as likely to cause more discomfort than benefit at this time.  Had at length discussion with patient about addressing his constipation which is the likely cause of his hemorrhoid.  He has been taking MiraLAX.  Will provide patient with additional instructions on medications he can take over-the-counter to help with the constipation as well as staying hydrated.  Will have patient follow-up with his PCP and gastroenterologist for reevaluation and further management.  Contact information provided.  Do not believe patient requires further labs, imaging or workup at this time.  Patient agreeable to this plan.  Strict return precautions verbally communicated to the patient as outlined in the AVS.  All patient questions and concerns were answered.  Patient verbally communicated their understanding and  "agreement to the above plan.  Patient stable at discharge.       Portions of the record may have been created with voice recognition software. Occasional wrong word or \"sound a like\" substitutions may have occurred due to the inherent limitations of voice recognition software. Read the chart carefully and recognize, using context, where substitutions have occurred.               Disposition  Final diagnoses:   Hemorrhoids   Constipation     Time reflects when diagnosis was documented in both MDM as applicable and the Disposition within this note       Time User Action Codes Description Comment    3/6/2024  8:03 PM All Mcgovern Add [K64.9] Hemorrhoids     3/6/2024  8:05 PM All Mcgovern Add [K59.00] Constipation           ED Disposition       ED Disposition   Discharge    Condition   Stable    Date/Time   Wed Mar 6, 2024  8:03 PM    Comment   Mukund Sam discharge to home/self care.                   Follow-up Information       Follow up With Specialties Details Why Contact Info Additional Information    [facility] Emergency Department Emergency Medicine Go to  As needed, If symptoms worsen [address] [place] [id number]  [phone] [facility] Emergency Department, [address], [place], [address]    [facility] Gastroenterology Call in 1 day For further evaluation [address] [facility]  [address]  [facility] [address] [phone] [facility], [address], [address], [address] [phone]            Patient's Medications   Discharge Prescriptions    No medications on file       No discharge procedures on file.    PDMP Review       None            ED Provider  Electronically Signed by             All Mcgovern PA-C  03/06/24 2014    "

## 2024-03-07 NOTE — DISCHARGE INSTRUCTIONS
Please return to the emergency department for any concerns as outlined in the after visit summary or for any other concerns.    Please follow-up with your primary care provider and gastroenterologist in the contact number provided in 2 days for re-evaluation and further management.    Continue ibuprofen or acetaminophen as needed for pain control.    Can use over-the-counter hemorrhoid cream.  Can also consider over-the-counter Tucks pads or performing sitz bath's.    Bowel Regiment  MiraLAX: 3 times a day for 3 days, then once daily  Colace: 1 pill twice a day  Citrucel: As directed on the bottle  You should drink at least 1.5 L of water per day

## 2024-03-08 ENCOUNTER — APPOINTMENT (OUTPATIENT)
Dept: RADIOLOGY | Facility: MEDICAL CENTER | Age: 19
End: 2024-03-08
Payer: COMMERCIAL

## 2024-03-08 ENCOUNTER — TELEPHONE (OUTPATIENT)
Dept: GASTROENTEROLOGY | Facility: MEDICAL CENTER | Age: 19
End: 2024-03-08

## 2024-03-08 ENCOUNTER — CONSULT (OUTPATIENT)
Dept: GASTROENTEROLOGY | Facility: MEDICAL CENTER | Age: 19
End: 2024-03-08
Payer: COMMERCIAL

## 2024-03-08 VITALS
WEIGHT: 152.3 LBS | HEIGHT: 66 IN | DIASTOLIC BLOOD PRESSURE: 72 MMHG | HEART RATE: 102 BPM | TEMPERATURE: 98.2 F | SYSTOLIC BLOOD PRESSURE: 126 MMHG | BODY MASS INDEX: 24.48 KG/M2

## 2024-03-08 DIAGNOSIS — K59.00 CONSTIPATION, UNSPECIFIED CONSTIPATION TYPE: ICD-10-CM

## 2024-03-08 DIAGNOSIS — K64.9 HEMORRHOIDS, UNSPECIFIED HEMORRHOID TYPE: ICD-10-CM

## 2024-03-08 DIAGNOSIS — R89.4 ABNORMAL CELIAC ANTIBODY PANEL: Primary | ICD-10-CM

## 2024-03-08 PROCEDURE — 99204 OFFICE O/P NEW MOD 45 MIN: CPT | Performed by: INTERNAL MEDICINE

## 2024-03-08 PROCEDURE — 74022 RADEX COMPL AQT ABD SERIES: CPT

## 2024-03-08 RX ORDER — HYDROCORTISONE ACETATE 25 MG/1
25 SUPPOSITORY RECTAL 2 TIMES DAILY
Qty: 12 SUPPOSITORY | Refills: 0 | Status: SHIPPED | OUTPATIENT
Start: 2024-03-08

## 2024-03-08 NOTE — PROGRESS NOTES
[facility] - Outpatient Consultation  Mukund Sam 18 y.o. male MRN: [id number]  Encounter: [id number]          ASSESSMENT AND PLAN:   18-year-old male with no significant past medical history who presents for evaluation.    1. Constipation, unspecified constipation type  2. Hemorrhoids, unspecified hemorrhoid type  He reports a chronic history of constipation, bloating and lower abdominal discomfort.  He has recently been having rectal pain and intermittent bright red blood per rectum.  On exam today he has a large external hemorrhoid with slight oozing of red blood.  I discussed hemorrhoid management with him today including high-fiber diet, fiber supplementation and adequate hydration.  I recommend starting sitz bath's and a course of Anusol suppositories.  His lower abdominal pain is likely due to his chronic constipation.  I discussed constipation management with him today and acute abdominal series was ordered for evaluation of his stool burden.  Given his infrequent bowel movements I recommend doing a bowel prep with MiraLAX and Dulcolax at home.  I offered him samples of Linzess however he states that he cannot take pills, therefore he will start MiraLAX twice daily after the bowel prep and titrate as needed to have a soft, formed bowel movement per day.  He has no indication for colonoscopy at this time however if symptoms persist despite the above measures she may require colonoscopy in the future        - XR abdomen obstruction series; Future  - hydrocortisone (ANUSOL-HC) 25 mg suppository; Insert 1 suppository (25 mg total) into the rectum 2 (two) times a day  Dispense: 12 suppository; Refill: 0    3. Abnormal celiac antibody panel  He was found to have a abnormal celiac panel with elevated TTG IgG and bleeding IgG.  I discussed this may indicate celiac disease but is not diagnostic.  I offered obtaining HLA DQ/8 testing versus EGD and he is agreeable to EGD for  further evaluation.  - EGD; Future    Follow-up after EGD  ______________________________________________________________________    HPI: 18-year-old male with no significant past medical history who presents for evaluation.    He reports chronic symptoms of lower abdominal bloating and distention.  He reports infrequent bowel movements usually every other day with Lilly type I stool associated with straining.  He reports a palpable external hemorrhoid with rectal bleeding with wiping and mixed with the stool.  He presented to the ER for this recently.  He was taking MiraLAX daily but still having small caliber bowel movements.  He eats a high-fiber diet and drinks plenty of water.    He reports a longstanding history of symptoms since he was a child    He reports possible history of his paternal grandmother with colon cancer  He has no prior EGD or colonoscopy      He has no recent abdominal imaging available for review  1/2024 celiac antibody profile showed an elevated TTG IgG at 10, gliadin IgG at 22, liver enzymes within normal limits, hemoglobin 16.4        REVIEW OF SYSTEMS:    CONSTITUTIONAL: Denies any fever, chills, rigors, and weight loss.  HEENT: No earache or tinnitus. Denies hearing loss or visual disturbances.  CARDIOVASCULAR: No chest pain or palpitations.   RESPIRATORY: Denies any cough, hemoptysis, shortness of breath or dyspnea on exertion.  GASTROINTESTINAL: As noted in the History of Present Illness.   GENITOURINARY: No problems with urination. Denies any hematuria or dysuria.  NEUROLOGIC: No dizziness or vertigo, denies headaches.   MUSCULOSKELETAL: Denies any muscle or joint pain.   SKIN: Denies skin rashes or itching.   ENDOCRINE: Denies excessive thirst. Denies intolerance to heat or cold.  PSYCHOSOCIAL: Denies depression or anxiety. Denies any recent memory loss.       Historical Information   Past Medical History:   Diagnosis Date    Anxiety     Autism 2004    Dr. Panchal    Bull's eye  maculopathy 04/26/2022    Class 2 obesity due to excess calories without serious comorbidity with body mass index (BMI) of 39.0 to 39.9 in adult 12/16/2016    Overview:   Patient not seen for 4 years due to loss of medical insurance. BMI 99.3% at 11 year well. Has not had anything other than water yet today so with send for obesity lab panel, will be a fasting sample.    Verbal apraxia 2004    Dr. Panchal      Past Surgical History:   Procedure Laterality Date    NO PAST SURGERIES      [place] ARTHRODESIS SUBTALAR Left 7/31/2023    Procedure: ARTHRODESIS / FUSION SUBTALAR JOINT;  Surgeon: Geovanny Soni DPM;  Location:  MAIN OR;  Service: Podiatry     Social History   Social History     Substance and Sexual Activity   Alcohol Use Never     Social History     Substance and Sexual Activity   Drug Use Never     Social History     Tobacco Use   Smoking Status Never   Smokeless Tobacco Never     Family History   Problem Relation Age of Onset    Learning disabilities Mother         Reading as a child    Obesity Mother     Diabetes Mother     Bipolar disorder Father     Depression Father     Anxiety disorder Maternal Grandmother     Obesity Maternal Grandmother     Depression Maternal Grandfather     Obesity Maternal Grandfather     Obesity Paternal Grandmother     Cancer Paternal Grandmother     Cancer Paternal Grandfather     Anxiety disorder Maternal Aunt     Bipolar disorder Maternal Aunt     Depression Maternal Aunt     Developmental delay Maternal Aunt         Did not talk until 2 years of age    Obesity Paternal Aunt     Depression Cousin     Obesity Maternal Aunt     Schizoaffective Disorder  Other     Breast cancer additional onset Other        Meds/Allergies       Current Outpatient Medications:     aspirin 325 mg tablet    b complex vitamins capsule    Multiple Vitamins-Minerals (MULTI-VITAMIN GUMMIES PO)    VITAMIN D, CHOLECALCIFEROL, PO    No Known Allergies        Objective     Blood pressure 126/72, pulse 102,  "temperature 98.2 °F (36.8 °C), temperature source Tympanic, height 5' 6\" (1.676 m), weight 69.1 kg (152 lb 4.8 oz). There is no height or weight on file to calculate BMI.        PHYSICAL EXAM:      General Appearance:   Alert, cooperative, no distress   HEENT:   Normocephalic, atraumatic, anicteric.     Neck:  Supple, symmetrical, trachea midline   Lungs:   Clear to auscultation bilaterally; no rales, rhonchi or wheezing; respirations unlabored    Heart::   Regular rate and rhythm; no murmur, rub, or gallop.   Abdomen:   Soft, non-tender, non-distended; normal bowel sounds; no masses, no organomegaly    Genitalia:   Deferred    Rectal:   Large external hemorrhoid with slight oozing of red blood.   Extremities:  No cyanosis, clubbing or edema    Pulses:  2+ and symmetric    Skin:  No jaundice, rashes, or lesions    Lymph nodes:  No palpable cervical lymphadenopathy        Lab Results:   No visits with results within 1 Day(s) from this visit.   Latest known visit with results is:   Appointment on 01/31/2024   Component Date Value    IgA 01/31/2024 179     Gliadin IgA 01/31/2024 8     Gliadin IgG 01/31/2024 22 (H)     Tissue Transglut Ab IGG 01/31/2024 10 (H)     TISSUE TRANSGLUTAMINASE * 01/31/2024 <2     Endomysial IgA 01/31/2024 Negative     Fish Cod 01/31/2024 <0.10     Egg White 01/31/2024 <0.10     Gluten 01/31/2024 <0.10     Milk, Cow's 01/31/2024 <0.10     Peanut 01/31/2024 <0.10     Daniel 01/31/2024 <0.10     Scallop IgE 01/31/2024 <0.10     Sesame Seed IgE 01/31/2024 <0.10     Shrimp 01/31/2024 <0.10     SOYBEAN 01/31/2024 <0.10     Tuna IgE 01/31/2024 <0.10     [place]atha 01/31/2024 <0.10     Wheat 01/31/2024 <0.10     Almonds 01/31/2024 <0.10     Cashew 01/31/2024 <0.10     Hazelnut 01/31/2024 <0.10     IgE 01/31/2024 29.2          Radiology Results:   No results found.  "

## 2024-03-08 NOTE — RESULT ENCOUNTER NOTE
Results relayed via [facility]  Moderate stool burden.  Continue MiraLAX prep as discussed in the office

## 2024-03-08 NOTE — PATIENT INSTRUCTIONS
Sitz Bath   WHAT YOU NEED TO KNOW:   A sitz bath is when you soak in a warm or hot water tub to promote wound healing. It is often done after surgeries on the rectal or genital area. The heat from the water will clean the area, improve blood flow for healing, and decrease swelling and pain.  DISCHARGE INSTRUCTIONS:   Gather your sitz bath supplies:   A bathtub thermometer to check the water temperature    Towels to cover your upper body during the bath and to dry off after    A footstool to help you enter the bathtub if needed    Bath mats to prevent slips in the tub and after you get out of the tub    Clean clothes to wear after the bath    Fill the bathtub with water:  Fill the bathtub with water until it is at about the level of your belly button. Check the temperature of the water before you get in. For a warm water sitz bath, the water should be 94° to 98° Fahrenheit. A hot water sitz bath should be between 105° to 110° Fahrenheit. Stay in the bath for about 15 to 20 minutes.   Portable sitz bath:  You may be sent home with a portable sitz bath if you cannot get in and out of the bathtub. This is a small tub that will fit under your toilet seat. You will fill the tub with water as directed once it is under the toilet seat. Check the temperature of the water. You will also have a squirt bottle or water bag filled with the warm water. These are used to let the water flow out over the wound area during the sitz bath. This is also done for 15 to 20 minutes.  Important tips when taking a sitz bath:   You may have some discomfort when at first when you sit in the warm water. This should go away shortly after entering the tub.     Check the water temperature a few times during the bath. You may need to add more water to keep it at the right temperature.    Take a sitz bath when someone is close by to help you if needed. The heat from the water may cause you to feel weak or lightheaded. If this happens, call for help  to get out of the tub. Do not stand up on your own in case you lose your balance.    © Copyright [employer] 2023 Information is for End User's use only and may not be sold, redistributed or otherwise used for commercial purposes.  The above information is an  only. It is not intended as medical advice for individual conditions or treatments. Talk to your doctor, nurse or pharmacist before following any medical regimen to see if it is safe and effective for you.  Hemorrhoids   AMBULATORY CARE:   Hemorrhoids  are swollen blood vessels inside your rectum (internal hemorrhoids) or on your anus (external hemorrhoids). Sometimes a hemorrhoid may prolapse. This means it extends out of your anus.  Common symptoms include the following:   Pain or itching around your anus or inside your rectum    Swelling or bumps around your anus    Bright red blood in your bowel movement, on the toilet paper, or in the toilet bowl    Tissue bulging out of your anus (prolapsed hemorrhoids)    Incontinence (poor control over urine or bowel movements)    Seek care immediately if:   You have severe pain in your rectum or around your anus.    You have severe pain in your abdomen and you are vomiting.     You have bleeding from your anus that soaks through your underwear.    Contact your healthcare provider if:   You have frequent and painful bowel movements.    Your hemorrhoid looks or feels more swollen than usual.     You do not have a bowel movement for 2 days or more.     You see or feel tissue coming through your anus.     You have questions or concerns about your condition or care.    Treatment for hemorrhoids  may include medicines to decrease pain, swelling, and itching. The medicine may be a pill, pad, cream, or ointment. Medicine may also be given to soften your bowel movement. Surgery and other procedures may be needed to shrink or remove your hemorrhoids.   Manage your symptoms:   Apply ice on your anus for 15 to 20  minutes every hour or as directed.  Use an ice pack, or put crushed ice in a plastic bag. Cover it with a towel before you apply it to your anus. Ice helps prevent tissue damage and decreases swelling and pain.    Take a sitz bath.  Fill a bathtub with 4 to 6 inches of warm water. You may also use a sitz bath pan that fits inside a toilet bowl. Sit in the sitz bath for 15 minutes. Do this 3 times a day, and after each bowel movement. The warm water can help decrease pain and swelling.     Keep your anal area clean.  Gently wash the area with warm water daily. Soap may irritate the area. After a bowel movement, wipe with moist towelettes or wet toilet paper. Dry toilet paper can irritate the area.    Prevent hemorrhoids:   Do not strain to have a bowel movement.  Do not sit on the toilet too long. These actions can increase pressure on the tissues in your rectum and anus.     Drink plenty of liquids.  Liquids can help prevent constipation. Ask how much liquid to drink each day and which liquids are best for you.     Eat a variety of high-fiber foods.  Examples include fruits, vegetables, and whole grains. Ask your healthcare provider how much fiber you need each day. You may need to take a fiber supplement.         Exercise as directed.  Exercise, such as walking, may make it easier to have a bowel movement. Ask your healthcare provider to help you create an exercise plan.     Do not have anal sex.  Anal sex can weaken the skin around your rectum and anus.     Avoid heavy lifting.  This can cause straining and increase your risk for another hemorrhoid.    Follow up with your doctor as directed:  Write down your questions so you remember to ask them during your visits.  © Copyright [employer] 2023 Information is for End User's use only and may not be sold, redistributed or otherwise used for commercial purposes.  The above information is an  only. It is not intended as medical advice for individual  conditions or treatments. Talk to your doctor, nurse or pharmacist before following any medical regimen to see if it is safe and effective for you.  High Fiber Diet   AMBULATORY CARE:   A high-fiber diet  includes foods that have a high amount of fiber. Fiber is the part of fruits, vegetables, and grains that is not broken down by your body. Fiber keeps your bowel movements regular. Fiber can also help lower your cholesterol level, control blood sugar in people with diabetes, and relieve constipation. Fiber can also help you control your weight because it helps you feel full faster. Most adults should eat 25 to 35 grams of fiber each day. Talk to your dietitian or healthcare provider about the amount of fiber you need.  Good sources of fiber:       Foods with at least 4 grams of fiber per serving:      ? to ½ cup of high-fiber cereal (check the nutrition label on the box)    ½ cup of blackberries or raspberries    4 dried prunes    1 cooked artichoke    ½ cup of cooked legumes, such as lentils, or red, kidney, and york beans    Foods with 1 to 3 grams of fiber per servin slice of whole-wheat, pumpernickel, or rye bread    ½ cup of cooked brown rice    4 whole-wheat crackers    1 cup of oatmeal    ½ cup of cereal with 1 to 3 grams of fiber per serving (check the nutrition label on the box)    1 small piece of fruit, such as an apple, banana, pear, kiwi, or orange    3 dates    ½ cup of canned apricots, fruit cocktail, peaches, or pears    ½ cup of raw or cooked vegetables, such as carrots, cauliflower, cabbage, spinach, squash, or corn  Ways that you can increase fiber in your diet:   Choose brown or wild rice instead of white rice.     Use whole wheat flour in recipes instead of white or all-purpose flour.     Add beans and peas to casseroles or soups.     Choose fresh fruit and vegetables with peels or skins on instead of juices.    Other diet guidelines to follow:   Add fiber to your diet slowly.  You  may have abdominal discomfort, bloating, and gas if you add fiber to your diet too quickly.     Drink plenty of liquids as you add fiber to your diet.  You may have nausea or develop constipation if you do not drink enough water. Ask how much liquid to drink each day and which liquids are best for you.    © Copyright [employer] 2023 Information is for End User's use only and may not be sold, redistributed or otherwise used for commercial purposes.  The above information is an  only. It is not intended as medical advice for individual conditions or treatments. Talk to your doctor, nurse or pharmacist before following any medical regimen to see if it is safe and effective for you.

## 2024-03-08 NOTE — TELEPHONE ENCOUNTER
Procedure: EGD  Date: 05/01/2024  Physician performing: Dr. Jaiyeola  Location of procedure:  [facility]  Instructions given to patient: Yes  Diabetic: No  Clearances: N/A

## 2024-03-11 ENCOUNTER — OFFICE VISIT (OUTPATIENT)
Dept: PHYSICAL THERAPY | Facility: CLINIC | Age: 19
End: 2024-03-11
Payer: COMMERCIAL

## 2024-03-11 DIAGNOSIS — Q66.89 TARSAL COALITION OF LEFT FOOT: Primary | ICD-10-CM

## 2024-03-11 PROCEDURE — 97140 MANUAL THERAPY 1/> REGIONS: CPT

## 2024-03-11 PROCEDURE — 97110 THERAPEUTIC EXERCISES: CPT

## 2024-03-11 PROCEDURE — 97112 NEUROMUSCULAR REEDUCATION: CPT

## 2024-03-11 NOTE — PROGRESS NOTES
"PT Discharge     Today's date: 3/11/2024  Patient name: Mukund Sam  : 2005  MRN: [id number]  Referring provider: Geovanny Soni DPM  Dx:   Encounter Diagnosis     ICD-10-CM    1. Tarsal coalition of left foot  Q66.89               Start Time: 1215  Stop Time: 1300  Total time in clinic (min): 45 minutes    Subjective: Patient reports he has made improvements in his pain levels, L ankle mobility, balance, and walking tolerance since beginning PT. Reports he is able to walk without a AD now and has been able to walk his aunt's dog without difficulty. Reports he has returned to the gym as well which is going well. Reports continued difficulty with prolonged standing and walking and notes he can stand/walk for 2 hours at a time before feeling increased pain. Notes his pain levels have improved overall but he continues to have L ankle pain that worsens with prolonged activity. Notes 4/10 pain currently and 8/10 pain at worst. Reports he is independent with his HEP and is agreeable to complete PT today and continue with his HEP to continue making improvements toward his remaining goals.     Objective: See treatment diary below    Goals  STG - to be met by week 4  1. Patient will be independent with HEP throughout therapy to prepare for eventual transition to maintenance program. - goal met  2. Patient will improve subjective pain to <=4/10 at worst to improve QOL. - not met  3. Patient will improve L ankle DF AROM to 5 deg to walk with less difficulty. - goal met  4. Patient will be able to walk without an AD to return to OF. - goal met     LTG - to be met by discharge   1. Patient will improve L ankle AROM to WFL to perform ADLs with less difficulty. - goal met  2. Patient will improve L ankle strength to >=4/5 to improve functional mobility. - not met  3. Patient will improve L SLS to 20\" to improve functional mobility. - goal met  4. Patient will improve FOTO score to age matched prediction to " demonstrate functional improvements. - goal met  5. Patient will be able to return to his normal exercise program without difficulty to return to PLOF. - goal met     Observations      Additional Observation Details  Gait Analysis: WFL with no AD     Neurological Testing      Additional Neurological Details  Diminished sensation at L lateral foot and 5th toe, all else WFL     Active Range of Motion (discharge/ progress note 2/ progress note 1/ IE)   Left Ankle/Foot   Dorsiflexion (ke): 10 deg / 10 deg / 8 deg / -10 degrees   Plantar flexion: 55 deg / 55 deg /  50 deg / 20 degrees with pain  Inversion: 20 sec / 20 deg /  20 deg / 0 degrees   Eversion: 6 deg / 6 deg /  5 deg / 5 degrees with pain     Right Ankle/Foot   Normal active range of motion     Passive Range of Motion (discharge/ progress note 2/ progress note 1/ IE)   Left Ankle/Foot     Dorsiflexion (ke): 10 deg / 10 deg /  10 deg / -5 degrees   Plantar flexion: 60 deg / 60 deg /  55 deg / 30 degrees with pain  Inversion: 25 deg / 25 deg /  25 deg / 5 degrees   Eversion: 10 deg / 10 deg /  10 deg / 10 degrees with pain     Right Ankle/Foot  Normal passive range of motion     Strength/Myotome Testing (discharge/ progress note 2/ progress note 1/ IE)      Left Ankle/Foot   Dorsiflexion: 5 / 5 /  4 / 2  Plantar flexion: 3+ / 3+ /  3 / 2  Inversion: 4+ / 4 /  4- / 2-  Eversion: 4 / 4 /  3+ / 2-     Right Ankle/Foot   Dorsiflexion: 5 / 5 /  4 / 4-  Plantar flexion: 4 / 4 /  4 / 4-  Inversion: 5 / 5 /  4 / 4-  Eversion: 5 / 5 /  4 / 4-     General Comments:        Ankle/Foot Comments (discharge/ progress note 2/ progress note 1/ IE)   Single leg balance:   LLE -  21 sec / 6 sec / 4 sec / unable       Precautions: L subtalar fusion on 7/31/23     Education: return to light jogging short distances as long as pain-free, HEP, progressing walking program, return to running when he can do a single leg heel raise without pain    Manuals 1/29 2/1 2/5 2/8 2/12 2/19 2/26  "3/11   L ankle PROM MK MK MK MK MK MK MK MK   L ankle STM MK MK MK MK MK MK MK MK   L TC mobs MK MK MK MK MK  MK    L ankle DF MWM           Neuro Re-Ed           Tandem balance            Functional heel raises Tandem stance 15x, L LE pain -         L ankle eversion/inversion isometrics           SLS 3x30\" black foam  3x30\" black foam  Ball toss 1x10, blk foam 2x45\" Ball toss 2x10, blk foam 2x45\" Ball toss 2x10, blk foam 2x60\", 2x30\" board ea Ball toss 2x10, blk foam 2x60\"  Ball toss 2x10, blk foam 2x60\"  Ball toss 2x10   BIODEX           Ladder drills           Tandem walking           Wall sits           Eccentric lateral step tap 3x10 6\"  3x10 6\"  3x10 6\" 3x10 6\" 3x10 6\" 3x10 6\" 3x10 6\" 3x10 6\"   Short foot   Standing 5\"x20 Standing 5\"x20 Standing 5\"x20 Standing 5\"x20 Standing 10\"x10 Standing 10\"x10   Toe yoga           Soleus heel raises 3x10 B stride stance  3x10 B stride stance  3x10 B stride stance  3x10 B stride stance  3x10 B stride stance  3x10 B stride stance  3x10 B stride stance  3x10 B stride stance    Balance board           Ther Ex           Bike           TM 5 min  5 min  5 min 5 min 5 min 5 min 5 min 5 min   Standing soleus stretch           Standing gastroc stretch           [place] squat Hi/low 60cm 2x10 with SPC 2x10 59 cm w/ SPC 2x10 59 cm w/ SPC 2x10 59 cm w/ SPC 2x10 59 cm w/ SPC 2x10 59 cm w/ SPC  2x10 59 cm   Ankle 2-way (inv/ev)  Nv  GTB 2x10 ea GTB 2x10 ea BTB 3x10 ea BTB 3x10 ea BTB 3x10 ea BTB 3x10 ea   Heel raises 4x10 B  2x10 ecc  4x10 B         Eccentric heel raises   2x10 2x10 3x10 3x10 3x10 3x10   Leg press LLE 185lb 2x10 2x12 185 lbs          Toe walking           HR on leg press           Ther Activity           High knee walk to heel off           [place] carry           Step up/ downs     8 in 2x10 ea 8 in 2x10 ea 8 in 2x10 ea 8 in 2x10 ea   Obstacle course           Sit to stands           SL RDL           Lunges           Stairs           Gait Training           Jogging       "  In place - 20 sec no pain; 10ftx4 no pain   Outside / hard uneven surface Inside) 4 laps ea tandem on foam, hurdles on foam; (outside) 3 laps tandem walking, 1 lap high knee with heel strike Inside) 4 laps ea tandem on foam, hurdles on foam; (outside) 3 laps tandem walking, 1 lap high knee with heel strike (Inside) 4 laps ea - tandem on foam, hurdles; (outside) 3 laps - tandem walking with heel strike (Inside) 4 laps - tandem on foam; 3 laps - tandem walking with heel strike (Inside) 4 laps - tandem on foam; (outside) curb 2x10, 2 laps tandem walking (Inside) 4 laps ea - tandem on foam, side stepping on foam; (outside) 3 laps - tandem walking with heel strike (Inside) 4 laps ea - tandem on foam, side stepping on foam; (outside) 4 laps - tandem walking with heel strike (outside) 4 laps - tandem walking with heel strike   Modalities                                    Assessment: Patient presents to PT for discharge. Subjectively, patient reports he has made improvement in his pain levels, L ankle mobility, balance, and walking tolerance since beginning PT. Objectively, patient has made improvements in L ankle ROM, L ankle strength, gait quality, and L single leg balance. Patient has met a majority of his therapy goals and is independent with his HEP. Patient was discharged from physical therapy.      Plan: Patient was discharged from physical therapy due to good progress overall and independence with his HEP.    Lavern Morin

## 2024-03-14 ENCOUNTER — OFFICE VISIT (OUTPATIENT)
Dept: FAMILY MEDICINE CLINIC | Facility: CLINIC | Age: 19
End: 2024-03-14
Payer: COMMERCIAL

## 2024-03-14 ENCOUNTER — APPOINTMENT (OUTPATIENT)
Dept: LAB | Facility: CLINIC | Age: 19
End: 2024-03-14
Payer: COMMERCIAL

## 2024-03-14 VITALS
DIASTOLIC BLOOD PRESSURE: 70 MMHG | WEIGHT: 148.13 LBS | TEMPERATURE: 97.4 F | OXYGEN SATURATION: 99 % | BODY MASS INDEX: 23.91 KG/M2 | SYSTOLIC BLOOD PRESSURE: 110 MMHG | HEART RATE: 110 BPM

## 2024-03-14 DIAGNOSIS — K64.9 HEMORRHOIDS, UNSPECIFIED HEMORRHOID TYPE: Primary | ICD-10-CM

## 2024-03-14 DIAGNOSIS — E56.9 VITAMIN DEFICIENCY: ICD-10-CM

## 2024-03-14 DIAGNOSIS — I10 PRIMARY HYPERTENSION: ICD-10-CM

## 2024-03-14 LAB
25(OH)D3 SERPL-MCNC: 39.8 NG/ML (ref 30–100)
IRON SATN MFR SERPL: 13 % (ref 15–50)
IRON SERPL-MCNC: 38 UG/DL (ref 50–212)
TIBC SERPL-MCNC: 295 UG/DL (ref 250–450)
UIBC SERPL-MCNC: 257 UG/DL (ref 155–355)

## 2024-03-14 PROCEDURE — 83550 IRON BINDING TEST: CPT

## 2024-03-14 PROCEDURE — 82607 VITAMIN B-12: CPT

## 2024-03-14 PROCEDURE — 82746 ASSAY OF FOLIC ACID SERUM: CPT

## 2024-03-14 PROCEDURE — 99214 OFFICE O/P EST MOD 30 MIN: CPT | Performed by: NURSE PRACTITIONER

## 2024-03-14 PROCEDURE — 82180 ASSAY OF ASCORBIC ACID: CPT

## 2024-03-14 PROCEDURE — 36415 COLL VENOUS BLD VENIPUNCTURE: CPT

## 2024-03-14 PROCEDURE — 82306 VITAMIN D 25 HYDROXY: CPT

## 2024-03-14 PROCEDURE — 82728 ASSAY OF FERRITIN: CPT

## 2024-03-14 PROCEDURE — 83540 ASSAY OF IRON: CPT

## 2024-03-14 NOTE — PROGRESS NOTES
Name: Mukund Sam      : 2005      MRN: [id number]  Encounter Provider: ARA Villarreal  Encounter Date: 3/14/2024   Encounter department: [facility]    Assessment & Plan     1. Hemorrhoids, unspecified hemorrhoid type  Assessment & Plan:  Patient's symptoms have improved significantly since making dietary changes and beginning MiraLAX.  Patient continues to follow with GI regarding this.      2. Vitamin deficiency  -     Vitamin D 25 hydroxy; Future  -     Vitamin B12; Future  -     Folate; Future  -     Vitamin C; Future  -     Iron Panel (Includes Ferritin, Iron Sat%, Iron, and TIBC); Future    3. Primary hypertension  Assessment & Plan:  This has remained diet controlled.           Subjective      Hemorrhoids: Patient was seen in the ED on 3/6/2024 due to rectal discomfort and bleeding.  Patient was noted to have hemorrhoids at that point.  Patient did have follow-up with a GI specialist on 3/8/2024 regarding this and per their note he was prescribed Anusol suppositories and was advised to begin MiraLAX twice daily and  increase water and fiber intake in his diet.  It was also noted that sitz bath was recommended to help with rectal discomfort.  The patient reports that he has been using MiraLAX and focusing on increasing his water and fiber intake and he has been having daily formed bowel movements.  He reports that he has not needed to use the suppositories as since making these changes he has not been straining during defecation.    Hypertension: Patient's blood pressure is at goal in the office today.  Patient is not currently taking blood pressure medication.  Patient denies lightheadedness, dizziness, frequent headaches, or vision changes.    Screening for vitamin deficiency: Patient is requesting to be screened for vitamin deficiencies.  Orders were placed to assess for vitamin deficiency.      Review of Systems   Constitutional:  Negative for chills and fever.    HENT:  Negative for ear pain and sore throat.    Eyes:  Negative for pain and visual disturbance.   Respiratory:  Negative for cough, chest tightness, shortness of breath and wheezing.    Cardiovascular:  Negative for chest pain, palpitations and leg swelling.   Gastrointestinal:  Positive for constipation (improving). Negative for abdominal distention, abdominal pain, anal bleeding, blood in stool, diarrhea, nausea, rectal pain and vomiting.   Endocrine: Negative for cold intolerance and heat intolerance.   Genitourinary:  Negative for decreased urine volume, dysuria and hematuria.   Musculoskeletal:  Negative for arthralgias, back pain and myalgias.   Skin:  Negative for color change and rash.   Allergic/Immunologic: Negative for environmental allergies.   Neurological:  Negative for dizziness, seizures, syncope, weakness, light-headedness, numbness and headaches.   Hematological:  Negative for adenopathy.   Psychiatric/Behavioral:  Negative for confusion. The patient is not nervous/anxious.    All other systems reviewed and are negative.      Current Outpatient Medications on File Prior to Visit   Medication Sig   • aspirin 325 mg tablet Take 1 tablet (325 mg total) by mouth daily   • b complex vitamins capsule Take 1 capsule by mouth daily   • hydrocortisone (ANUSOL-HC) 25 mg suppository Insert 1 suppository (25 mg total) into the rectum 2 (two) times a day   • Multiple Vitamins-Minerals (MULTI-VITAMIN GUMMIES PO) Take by mouth   • [DISCONTINUED] VITAMIN D, CHOLECALCIFEROL, PO Take by mouth (Patient not taking: Reported on 3/8/2024)       Objective     /70 (BP Location: Right arm, Patient Position: Sitting, Cuff Size: Standard)   Pulse (!) 110   Temp (!) 97.4 °F (36.3 °C) (Temporal)   Wt 67.2 kg (148 lb 2 oz)   SpO2 99%   BMI 23.91 kg/m²     Physical Exam  Vitals and nursing note reviewed.   Constitutional:       General: He is not in acute distress.     Appearance: Normal appearance. He is not  ill-appearing.   HENT:      Head: Normocephalic.   Eyes:      Conjunctiva/sclera: Conjunctivae normal.   Cardiovascular:      Rate and Rhythm: Normal rate and regular rhythm.      Pulses: Normal pulses.           Carotid pulses are 2+ on the right side and 2+ on the left side.       Radial pulses are 2+ on the right side and 2+ on the left side.        Posterior tibial pulses are 2+ on the right side and 2+ on the left side.      Heart sounds: Normal heart sounds. No murmur heard.  Pulmonary:      Effort: Pulmonary effort is normal. No respiratory distress.      Breath sounds: Normal breath sounds. No decreased breath sounds, wheezing, rhonchi or rales.   Abdominal:      General: Abdomen is flat. Bowel sounds are normal. There is no distension.      Palpations: Abdomen is soft.      Tenderness: There is abdominal tenderness (slight) in the left lower quadrant. There is no guarding.   Musculoskeletal:         General: Normal range of motion.      Cervical back: Normal range of motion.      Right lower leg: No edema.      Left lower leg: No edema.   Skin:     General: Skin is warm and dry.      Capillary Refill: Capillary refill takes less than 2 seconds.   Neurological:      General: No focal deficit present.      Mental Status: He is alert and oriented to person, place, and time.   Psychiatric:         Mood and Affect: Mood normal.         Behavior: Behavior normal.         Thought Content: Thought content normal.         Judgment: Judgment normal.       ARA Villarreal

## 2024-03-14 NOTE — ASSESSMENT & PLAN NOTE
Patient's symptoms have improved significantly since making dietary changes and beginning MiraLAX.  Patient continues to follow with GI regarding this.

## 2024-03-14 NOTE — PATIENT INSTRUCTIONS
High Fiber Diet   WHAT YOU NEED TO KNOW:   A high-fiber diet includes foods that have a high amount of fiber. Fiber is the part of fruits, vegetables, and grains that is not broken down by your body. Fiber keeps your bowel movements regular. Fiber can also help lower your cholesterol level, control blood sugar in people with diabetes, and relieve constipation. Fiber can also help you control your weight because it helps you feel full faster. Most adults should eat 25 to 35 grams of fiber each day. Talk to your dietitian or healthcare provider about the amount of fiber you need.  DISCHARGE INSTRUCTIONS:   Good sources of fiber:       Foods with at least 4 grams of fiber per serving:       ? to ½ cup of high-fiber cereal (check the nutrition label on the box)     ½ cup of blackberries or raspberries     4 dried prunes     1 cooked artichoke     ½ cup of cooked legumes, such as lentils, or red, kidney, and york beans     Foods with 1 to 3 grams of fiber per servin slice of whole-wheat, pumpernickel, or rye bread     ½ cup of cooked brown rice     4 whole-wheat crackers     1 cup of oatmeal     ½ cup of cereal with 1 to 3 grams of fiber per serving (check the nutrition label on the box)     1 small piece of fruit, such as an apple, banana, pear, kiwi, or orange     3 dates     ½ cup of canned apricots, fruit cocktail, peaches, or pears     ½ cup of raw or cooked vegetables, such as carrots, cauliflower, cabbage, spinach, squash, or corn  Ways that you can increase fiber in your diet:   Choose brown or wild rice instead of white rice.      Use whole wheat flour in recipes instead of white or all-purpose flour.      Add beans and peas to casseroles or soups.      Choose fresh fruit and vegetables with peels or skins on instead of juices.     Other diet guidelines to follow:   Add fiber to your diet slowly.  You may have abdominal discomfort, bloating, and gas if you add fiber to your diet too quickly.       Drink plenty of liquids as you add fiber to your diet.  You may have nausea or develop constipation if you do not drink enough water. Ask how much liquid to drink each day and which liquids are best for you.     © Copyright [employer] 2023 Information is for End User's use only and may not be sold, redistributed or otherwise used for commercial purposes.  The above information is an  only. It is not intended as medical advice for individual conditions or treatments. Talk to your doctor, nurse or pharmacist before following any medical regimen to see if it is safe and effective for you.

## 2024-03-15 LAB
FERRITIN SERPL-MCNC: 70 NG/ML (ref 24–336)
FOLATE SERPL-MCNC: >22.3 NG/ML
VIT B12 SERPL-MCNC: 1191 PG/ML (ref 180–914)

## 2024-03-20 LAB — VIT C SERPL-MCNC: 1.3 MG/DL (ref 0.4–2)

## 2024-03-21 DIAGNOSIS — E61.1 IRON DEFICIENCY: Primary | ICD-10-CM

## 2024-03-21 RX ORDER — FERROUS SULFATE 324(65)MG
324 TABLET, DELAYED RELEASE (ENTERIC COATED) ORAL
Qty: 90 TABLET | Refills: 1 | Status: SHIPPED | OUTPATIENT
Start: 2024-03-21

## 2024-03-22 ENCOUNTER — TELEPHONE (OUTPATIENT)
Age: 19
End: 2024-03-22

## 2024-03-22 NOTE — TELEPHONE ENCOUNTER
Pt calling trying to get a sooner EGD procedure appt w/ Dr. Jaiyeola. I informed pt the only available appt w/ the doctor is 4/10/24 at the [facility]. Pt refused appt and prefer to keep current appt 5/1/24 [facility].

## 2024-04-09 RX ORDER — SODIUM CHLORIDE 9 MG/ML
125 INJECTION, SOLUTION INTRAVENOUS CONTINUOUS
Status: CANCELLED | OUTPATIENT
Start: 2024-04-09

## 2024-04-10 ENCOUNTER — HOSPITAL ENCOUNTER (OUTPATIENT)
Dept: GASTROENTEROLOGY | Facility: HOSPITAL | Age: 19
Setting detail: OUTPATIENT SURGERY
Discharge: HOME | End: 2024-04-10
Attending: INTERNAL MEDICINE
Payer: COMMERCIAL

## 2024-04-10 ENCOUNTER — ANESTHESIA (OUTPATIENT)
Dept: GASTROENTEROLOGY | Facility: HOSPITAL | Age: 19
End: 2024-04-10

## 2024-04-10 ENCOUNTER — ANESTHESIA EVENT (OUTPATIENT)
Dept: GASTROENTEROLOGY | Facility: HOSPITAL | Age: 19
End: 2024-04-10

## 2024-04-10 VITALS
HEIGHT: 66 IN | TEMPERATURE: 99.5 F | WEIGHT: 154 LBS | RESPIRATION RATE: 18 BRPM | DIASTOLIC BLOOD PRESSURE: 58 MMHG | OXYGEN SATURATION: 100 % | BODY MASS INDEX: 24.75 KG/M2 | HEART RATE: 89 BPM | SYSTOLIC BLOOD PRESSURE: 132 MMHG

## 2024-04-10 DIAGNOSIS — R89.4 ABNORMAL CELIAC ANTIBODY PANEL: ICD-10-CM

## 2024-04-10 PROBLEM — F41.9 ANXIETY: Status: ACTIVE | Noted: 2024-04-10

## 2024-04-10 PROCEDURE — 43239 EGD BIOPSY SINGLE/MULTIPLE: CPT | Performed by: INTERNAL MEDICINE

## 2024-04-10 PROCEDURE — 88305 TISSUE EXAM BY PATHOLOGIST: CPT | Performed by: PATHOLOGY

## 2024-04-10 RX ORDER — SODIUM CHLORIDE 9 MG/ML
125 INJECTION, SOLUTION INTRAVENOUS CONTINUOUS
Status: DISCONTINUED | OUTPATIENT
Start: 2024-04-10 | End: 2024-04-14 | Stop reason: HOSPADM

## 2024-04-10 RX ORDER — LIDOCAINE HYDROCHLORIDE 20 MG/ML
INJECTION, SOLUTION EPIDURAL; INFILTRATION; INTRACAUDAL; PERINEURAL AS NEEDED
Status: DISCONTINUED | OUTPATIENT
Start: 2024-04-10 | End: 2024-04-10

## 2024-04-10 RX ORDER — PROPOFOL 10 MG/ML
INJECTION, EMULSION INTRAVENOUS AS NEEDED
Status: DISCONTINUED | OUTPATIENT
Start: 2024-04-10 | End: 2024-04-10

## 2024-04-10 RX ADMIN — PROPOFOL 50 MG: 10 INJECTION, EMULSION INTRAVENOUS at 11:33

## 2024-04-10 RX ADMIN — PROPOFOL 150 MG: 10 INJECTION, EMULSION INTRAVENOUS at 11:29

## 2024-04-10 RX ADMIN — PROPOFOL 50 MG: 10 INJECTION, EMULSION INTRAVENOUS at 11:30

## 2024-04-10 RX ADMIN — SODIUM CHLORIDE 125 ML/HR: 0.9 INJECTION, SOLUTION INTRAVENOUS at 10:43

## 2024-04-10 RX ADMIN — PROPOFOL 50 MG: 10 INJECTION, EMULSION INTRAVENOUS at 11:34

## 2024-04-10 RX ADMIN — PROPOFOL 50 MG: 10 INJECTION, EMULSION INTRAVENOUS at 11:32

## 2024-04-10 RX ADMIN — LIDOCAINE HYDROCHLORIDE 100 MG: 20 INJECTION, SOLUTION EPIDURAL; INFILTRATION; INTRACAUDAL at 11:29

## 2024-04-10 RX ADMIN — PROPOFOL 100 MG: 10 INJECTION, EMULSION INTRAVENOUS at 11:35

## 2024-04-10 RX ADMIN — SODIUM CHLORIDE 125 ML/HR: 0.9 INJECTION, SOLUTION INTRAVENOUS at 11:59

## 2024-04-10 NOTE — ANESTHESIA PREPROCEDURE EVALUATION
Procedure:  EGD    Relevant Problems   CARDIO   (+) Hypertension      NEURO/PSYCH   (+) Anxiety   (+) Chronic foot pain, left   (+) Chronic pain of left ankle   (+) VIV (generalized anxiety disorder)      Behavioral Health   (+) Autism spectrum disorder-high functioning      Neurology/Sleep   (+) Central auditory processing disorder (CAPD)        Physical Exam    Airway    Mallampati score: I  TM Distance: >3 FB  Neck ROM: full     Dental   No notable dental hx     Cardiovascular  Cardiovascular exam normal    Pulmonary  Pulmonary exam normal     Other Findings  post-pubertal.      Anesthesia Plan  ASA Score- 2     Anesthesia Type- IV sedation with anesthesia with ASA Monitors.         Additional Monitors:     Airway Plan:            Plan Factors-Exercise tolerance (METS): >4 METS.    Chart reviewed.   Existing labs reviewed. Patient summary reviewed.    Patient is not a current smoker.              Induction- intravenous.    Postoperative Plan-     Informed Consent- Anesthetic plan and risks discussed with patient.  I personally reviewed this patient with the CRNA. Discussed and agreed on the Anesthesia Plan with the CRNA..

## 2024-04-10 NOTE — H&P
H&P EXAM - Outpatient Endoscopy   Mukund Sam 18 y.o. male MRN: [id number]    [facility] PRE/POST   Encounter: [id number]        History and Physical -  [facility]  Mukund Sam 18 y.o. male MRN: [id number]                  HPI: Mukund Sam is a 18 y.o. year old male who presents for abnormal celiac serologies      REVIEW OF SYSTEMS: Per the HPI, and otherwise unremarkable.    Historical Information   Past Medical History:   Diagnosis Date    Anxiety 4/10/2024    Autism 2004    Dr. 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 BMI 99.3% at 11 year well. Has not had anything other than water yet today so with send for obesity lab panel, will be a fasting sample.    Verbal apraxia 2004    Dr. Panchal      Past Surgical History:   Procedure Laterality Date    FRACTURE SURGERY Left     left foot    NO PAST SURGERIES      NE ARTHRODESIS SUBTALAR Left 07/31/2023    Procedure: ARTHRODESIS / FUSION SUBTALAR JOINT;  Surgeon: Geovanny Soni DPM;  Location:  MAIN OR;  Service: Podiatry     Social History   Social History     Substance and Sexual Activity   Alcohol Use Never     Social History     Substance and Sexual Activity   Drug Use Never     Social History     Tobacco Use   Smoking Status Never   Smokeless Tobacco Never     Family History   Problem Relation Age of Onset    Learning disabilities Mother         Reading as a child    Obesity Mother     Diabetes Mother     Irritable bowel syndrome Mother     Bipolar disorder Father     Depression Father     Diverticulitis Father     Anxiety disorder Maternal Grandmother     Obesity Maternal Grandmother     Depression Maternal Grandfather     Obesity Maternal Grandfather     Obesity  "Paternal Grandmother     Cancer Paternal Grandmother     Cancer Paternal Grandfather     Anxiety disorder Maternal Aunt     Bipolar disorder Maternal Aunt     Depression Maternal Aunt     Developmental delay Maternal Aunt         Did not talk until 2 years of age    Obesity Maternal Aunt     Obesity Paternal Aunt     Depression Cousin     Schizoaffective Disorder  Other     Breast cancer additional onset Other        Meds/Allergies     (Not in a hospital admission)      No Known Allergies    Objective     /64   Pulse 94   Temp 99.5 °F (37.5 °C) (Temporal)   Resp 16   Ht 5' 6\" (1.676 m)   Wt 69.9 kg (154 lb)   SpO2 98%   BMI 24.86 kg/m²       PHYSICAL EXAM    Gen: NAD  CV: RRR  CHEST: Clear  ABD: soft, NT/ND  EXT: no edema      ASSESSMENT/PLAN:  This is a 18 y.o. year old male here for egd, and he is stable and optimized for his procedure.          "

## 2024-04-10 NOTE — ANESTHESIA POSTPROCEDURE EVALUATION
Post-Op Assessment Note    CV Status:  Stable    Pain management: adequate       Mental Status:  Alert and awake   Hydration Status:  Euvolemic   PONV Controlled:  Controlled   Airway Patency:  Patent     Post Op Vitals Reviewed: Yes    No anethesia notable event occurred.    Staff: Anesthesiologist               BP (!) 87/46 (04/10/24 1140)    Temp      Pulse 73 (04/10/24 1140)   Resp 18 (04/10/24 1140)    SpO2 96 % (04/10/24 1140)

## 2024-04-11 PROCEDURE — 88305 TISSUE EXAM BY PATHOLOGIST: CPT | Performed by: PATHOLOGY

## 2024-04-11 NOTE — RESULT ENCOUNTER NOTE
[facility]  Duodenal biopsies were negative for celiac disease. Patient was on a gluten free diet for 3 months prior to the the procedure but also noted no improvement of his symptoms. Trial reintroducing gluten back into the diet and follow up in the office

## 2024-04-15 ENCOUNTER — OFFICE VISIT (OUTPATIENT)
Dept: FAMILY MEDICINE CLINIC | Facility: CLINIC | Age: 19
End: 2024-04-15
Payer: COMMERCIAL

## 2024-04-15 VITALS
BODY MASS INDEX: 23.95 KG/M2 | WEIGHT: 149 LBS | SYSTOLIC BLOOD PRESSURE: 120 MMHG | DIASTOLIC BLOOD PRESSURE: 70 MMHG | HEIGHT: 66 IN | OXYGEN SATURATION: 99 % | HEART RATE: 77 BPM

## 2024-04-15 DIAGNOSIS — R14.0 BLOATING: ICD-10-CM

## 2024-04-15 DIAGNOSIS — R14.3 FLATUS: ICD-10-CM

## 2024-04-15 DIAGNOSIS — E61.1 IRON DEFICIENCY: ICD-10-CM

## 2024-04-15 DIAGNOSIS — K59.09 OTHER CONSTIPATION: ICD-10-CM

## 2024-04-15 DIAGNOSIS — R10.84 GENERALIZED ABDOMINAL PAIN: Primary | ICD-10-CM

## 2024-04-15 DIAGNOSIS — M62.89 MUSCLE MASS: ICD-10-CM

## 2024-04-15 PROCEDURE — 99214 OFFICE O/P EST MOD 30 MIN: CPT | Performed by: NURSE PRACTITIONER

## 2024-04-15 RX ORDER — POLYETHYLENE GLYCOL 3350 17 G/17G
17 POWDER, FOR SOLUTION ORAL DAILY
COMMUNITY

## 2024-04-15 RX ORDER — FERROUS SULFATE 220 (44)/5
300 SOLUTION, ORAL ORAL DAILY
Qty: 120 ML | Refills: 2 | Status: SHIPPED | OUTPATIENT
Start: 2024-04-15

## 2024-04-15 RX ORDER — DICYCLOMINE HYDROCHLORIDE 10 MG/5ML
20 SOLUTION ORAL 4 TIMES DAILY PRN
Qty: 473 ML | Refills: 0 | Status: SHIPPED | OUTPATIENT
Start: 2024-04-15

## 2024-04-15 NOTE — PROGRESS NOTES
Name: Mukund Sam      : 2005      MRN: [id number]  Encounter Provider: ARA Benjamin  Encounter Date: 4/15/2024   Encounter department: [facility] PRIMARY CARE    Assessment & Plan     1. Generalized abdominal pain  Assessment & Plan:  Stool samples were ordered to assess for any signs of pancreatic insufficiency or microscopic colitis.  Since patient had an EGD completed in the lower bowels were not evaluated yet I did order a CT of the abdomen and pelvis to assess for any intra-abdominal abnormalities.  Bentyl was also ordered to be used 4 times daily as needed for GI symptoms.    Orders:  -     Pancreatic elastase, fecal; Future  -     Fecal fat, qualitative; Future  -     Calprotectin,Fecal; Future  -     CT abdomen pelvis w contrast; Future; Expected date: 04/15/2024  -     dicyclomine (BENTYL) 10 mg/5 mL oral solution; Take 10 mL (20 mg total) by mouth 4 (four) times a day as needed (Abdominal pain or bloating)    2. Iron deficiency  Assessment & Plan:  Patient will be started on liquid iron instead of oral tablets.  Patient was advised that if his constipation worsens or he is unable to tolerate the liquid iron he can be referred to hematology in the future for iron infusions.    Orders:  -     Ferrous Sulfate 300 MG/6.8ML SOLN; Take 300 mg by mouth in the morning    3. Flatus  -     Pancreatic elastase, fecal; Future  -     Fecal fat, qualitative; Future  -     Calprotectin,Fecal; Future  -     CT abdomen pelvis w contrast; Future; Expected date: 04/15/2024  -     dicyclomine (BENTYL) 10 mg/5 mL oral solution; Take 10 mL (20 mg total) by mouth 4 (four) times a day as needed (Abdominal pain or bloating)    4. Other constipation  Assessment & Plan:  Patient was advised to continue MiraLAX daily to help prevent constipation.  He was also advised to increase his fiber and water intake.    Orders:  -     Pancreatic elastase, fecal; Future  -     Fecal fat, qualitative;  Future  -     Calprotectin,Fecal; Future  -     CT abdomen pelvis w contrast; Future; Expected date: 04/15/2024  -     dicyclomine (BENTYL) 10 mg/5 mL oral solution; Take 10 mL (20 mg total) by mouth 4 (four) times a day as needed (Abdominal pain or bloating)    5. Muscle mass  -     Cortisol; Future  -     Testosterone, free, total; Future    6. Bloating  Assessment & Plan:  Bentyl was ordered to be used as needed for GI symptoms.    Orders:  -     Pancreatic elastase, fecal; Future  -     Fecal fat, qualitative; Future  -     Calprotectin,Fecal; Future  -     CT abdomen pelvis w contrast; Future; Expected date: 04/15/2024  -     dicyclomine (BENTYL) 10 mg/5 mL oral solution; Take 10 mL (20 mg total) by mouth 4 (four) times a day as needed (Abdominal pain or bloating)        Depression Screening and Follow-up Plan: Patient was screened for depression during today's encounter. They screened negative with a PHQ-2 score of 0.        Subjective      GI concerns: Over the past few months the patient has been noting recurrent episodes of increased flatus, bloating, generalized abdominal pain, and constipation.  The patient did have follow-up with GI regarding his symptoms and did recently have an EGD completed which was noted to be normal.  The patient did have tissue sampling completed at that time to assess for any signs of celiac disease or H. pylori and tissue sampling was normal.  Of note, the patient did have a food allergy profile completed previously which did show celiac intolerance.  The patient reports that he has eliminated gluten from his diet at this point but is still experiencing symptoms.    Iron deficiency: Patient's most recent iron panel did show that he was iron deficient.  Patient was started on ferrous sulfate 324 mg daily however, he is requesting that the medication be changed to a liquid as it is difficult for him to swallow the pills.    Decreased muscle mass: Patient reports that he feels it  is difficult for him to put on muscle mass even though he is consistently weight training.  Patient would like his cortisol and testosterone levels to be assessed.      Review of Systems   Constitutional:  Negative for chills and fever.   HENT:  Negative for ear pain and sore throat.    Eyes:  Negative for pain and visual disturbance.   Respiratory:  Negative for cough, chest tightness, shortness of breath and wheezing.    Cardiovascular:  Negative for chest pain, palpitations and leg swelling.   Gastrointestinal:  Positive for abdominal pain (generalized) and constipation (recurrent). Negative for abdominal distention, anal bleeding, blood in stool, diarrhea, nausea, rectal pain and vomiting.        Bloating and increased flatus   Endocrine: Negative for cold intolerance and heat intolerance.   Genitourinary:  Negative for decreased urine volume, dysuria and hematuria.   Musculoskeletal:  Negative for arthralgias, back pain and myalgias.   Skin:  Negative for color change and rash.   Allergic/Immunologic: Negative for environmental allergies.   Neurological:  Negative for dizziness, seizures, syncope, weakness, light-headedness, numbness and headaches.   Hematological:  Negative for adenopathy.   Psychiatric/Behavioral:  Negative for confusion. The patient is not nervous/anxious.    All other systems reviewed and are negative.      Current Outpatient Medications on File Prior to Visit   Medication Sig   • aspirin 325 mg tablet Take 1 tablet (325 mg total) by mouth daily   • b complex vitamins capsule Take 1 capsule by mouth daily   • polyethylene glycol (MIRALAX) 17 g packet Take 17 g by mouth daily   • hydrocortisone (ANUSOL-HC) 25 mg suppository Insert 1 suppository (25 mg total) into the rectum 2 (two) times a day   • Vitamin D 12.5 MCG/0.25ML LIQD Take 5,000 Int'l Units/day by mouth in the morning   • [DISCONTINUED] ferrous sulfate 324 (65 Fe) mg Take 1 tablet (324 mg total) by mouth daily before breakfast  "      Objective     /70 (BP Location: Right arm, Patient Position: Sitting, Cuff Size: Adult)   Pulse 77   Ht 5' 6\" (1.676 m)   Wt 67.6 kg (149 lb)   SpO2 99%   BMI 24.05 kg/m²     Physical Exam  Vitals and nursing note reviewed.   Constitutional:       General: He is not in acute distress.     Appearance: Normal appearance. He is not ill-appearing.   HENT:      Head: Normocephalic.   Eyes:      Conjunctiva/sclera: Conjunctivae normal.   Cardiovascular:      Rate and Rhythm: Normal rate and regular rhythm.      Pulses: Normal pulses.           Carotid pulses are 2+ on the right side and 2+ on the left side.       Radial pulses are 2+ on the right side and 2+ on the left side.        Posterior tibial pulses are 2+ on the right side and 2+ on the left side.      Heart sounds: Normal heart sounds. No murmur heard.  Pulmonary:      Effort: Pulmonary effort is normal. No respiratory distress.      Breath sounds: Normal breath sounds. No decreased breath sounds, wheezing, rhonchi or rales.   Abdominal:      General: Abdomen is flat. Bowel sounds are normal. There is no distension.      Palpations: Abdomen is soft.      Tenderness: There is abdominal tenderness (slight) in the right lower quadrant. There is no guarding. Negative signs include Huerta's sign.   Musculoskeletal:         General: Normal range of motion.      Cervical back: Normal range of motion.      Right lower leg: No edema.      Left lower leg: No edema.   Skin:     General: Skin is warm and dry.      Capillary Refill: Capillary refill takes less than 2 seconds.   Neurological:      General: No focal deficit present.      Mental Status: He is alert and oriented to person, place, and time.   Psychiatric:         Mood and Affect: Mood normal.         Behavior: Behavior normal.         Thought Content: Thought content normal.         Judgment: Judgment normal.       ARA Benjamin    "

## 2024-04-16 NOTE — ASSESSMENT & PLAN NOTE
Patient will be started on liquid iron instead of oral tablets.  Patient was advised that if his constipation worsens or he is unable to tolerate the liquid iron he can be referred to hematology in the future for iron infusions.

## 2024-04-16 NOTE — ASSESSMENT & PLAN NOTE
Patient was advised to continue MiraLAX daily to help prevent constipation.  He was also advised to increase his fiber and water intake.

## 2024-04-16 NOTE — ASSESSMENT & PLAN NOTE
Stool samples were ordered to assess for any signs of pancreatic insufficiency or microscopic colitis.  Since patient had an EGD completed in the lower bowels were not evaluated yet I did order a CT of the abdomen and pelvis to assess for any intra-abdominal abnormalities.  Bentyl was also ordered to be used 4 times daily as needed for GI symptoms.

## 2024-04-17 ENCOUNTER — APPOINTMENT (OUTPATIENT)
Dept: LAB | Facility: CLINIC | Age: 19
End: 2024-04-17
Payer: COMMERCIAL

## 2024-04-17 DIAGNOSIS — Z13.220 SCREENING FOR LIPID DISORDERS: ICD-10-CM

## 2024-04-17 DIAGNOSIS — E61.1 IRON DEFICIENCY: ICD-10-CM

## 2024-04-17 DIAGNOSIS — M62.89 MUSCLE MASS: ICD-10-CM

## 2024-04-17 DIAGNOSIS — I10 PRIMARY HYPERTENSION: ICD-10-CM

## 2024-04-17 LAB
ALBUMIN SERPL BCP-MCNC: 4.5 G/DL (ref 3.5–5)
ALP SERPL-CCNC: 63 U/L (ref 34–104)
ALT SERPL W P-5'-P-CCNC: 22 U/L (ref 7–52)
ANION GAP SERPL CALCULATED.3IONS-SCNC: 10 MMOL/L (ref 4–13)
AST SERPL W P-5'-P-CCNC: 22 U/L (ref 13–39)
BACTERIA UR QL AUTO: ABNORMAL /HPF
BILIRUB SERPL-MCNC: 0.69 MG/DL (ref 0.2–1)
BILIRUB UR QL STRIP: NEGATIVE
BUN SERPL-MCNC: 14 MG/DL (ref 5–25)
CALCIUM SERPL-MCNC: 9.4 MG/DL (ref 8.4–10.2)
CHLORIDE SERPL-SCNC: 103 MMOL/L (ref 96–108)
CHOLEST SERPL-MCNC: 169 MG/DL
CLARITY UR: CLEAR
CO2 SERPL-SCNC: 28 MMOL/L (ref 21–32)
COLOR UR: YELLOW
CORTIS SERPL-MCNC: 15.4 UG/DL
CREAT SERPL-MCNC: 0.89 MG/DL (ref 0.6–1.3)
FERRITIN SERPL-MCNC: 65 NG/ML (ref 24–336)
GFR SERPL CREATININE-BSD FRML MDRD: 123 ML/MIN/1.73SQ M
GLUCOSE P FAST SERPL-MCNC: 83 MG/DL (ref 65–99)
GLUCOSE UR STRIP-MCNC: NEGATIVE MG/DL
HDLC SERPL-MCNC: 50 MG/DL
HGB UR QL STRIP.AUTO: NEGATIVE
IRON SATN MFR SERPL: 36 % (ref 15–50)
IRON SERPL-MCNC: 122 UG/DL (ref 50–212)
KETONES UR STRIP-MCNC: NEGATIVE MG/DL
LDLC SERPL CALC-MCNC: 109 MG/DL (ref 0–100)
LEUKOCYTE ESTERASE UR QL STRIP: NEGATIVE
MUCOUS THREADS UR QL AUTO: ABNORMAL
NITRITE UR QL STRIP: NEGATIVE
NON-SQ EPI CELLS URNS QL MICRO: ABNORMAL /HPF
PH UR STRIP.AUTO: 6 [PH]
POTASSIUM SERPL-SCNC: 4.3 MMOL/L (ref 3.5–5.3)
PROT SERPL-MCNC: 7.1 G/DL (ref 6.4–8.4)
PROT UR STRIP-MCNC: ABNORMAL MG/DL
RBC #/AREA URNS AUTO: ABNORMAL /HPF
SODIUM SERPL-SCNC: 141 MMOL/L (ref 135–147)
SP GR UR STRIP.AUTO: 1.02 (ref 1–1.03)
TIBC SERPL-MCNC: 335 UG/DL (ref 250–450)
TRIGL SERPL-MCNC: 51 MG/DL
UIBC SERPL-MCNC: 213 UG/DL (ref 155–355)
UROBILINOGEN UR STRIP-ACNC: <2 MG/DL
WBC #/AREA URNS AUTO: ABNORMAL /HPF

## 2024-04-17 PROCEDURE — 80053 COMPREHEN METABOLIC PANEL: CPT

## 2024-04-17 PROCEDURE — 82533 TOTAL CORTISOL: CPT

## 2024-04-17 PROCEDURE — 81001 URINALYSIS AUTO W/SCOPE: CPT

## 2024-04-17 PROCEDURE — 84403 ASSAY OF TOTAL TESTOSTERONE: CPT

## 2024-04-17 PROCEDURE — 83540 ASSAY OF IRON: CPT

## 2024-04-17 PROCEDURE — 36415 COLL VENOUS BLD VENIPUNCTURE: CPT

## 2024-04-17 PROCEDURE — 83550 IRON BINDING TEST: CPT

## 2024-04-17 PROCEDURE — 82728 ASSAY OF FERRITIN: CPT

## 2024-04-17 PROCEDURE — 84402 ASSAY OF FREE TESTOSTERONE: CPT

## 2024-04-17 PROCEDURE — 80061 LIPID PANEL: CPT

## 2024-04-18 ENCOUNTER — APPOINTMENT (OUTPATIENT)
Dept: LAB | Facility: CLINIC | Age: 19
End: 2024-04-18
Payer: COMMERCIAL

## 2024-04-18 DIAGNOSIS — R10.84 GENERALIZED ABDOMINAL PAIN: ICD-10-CM

## 2024-04-18 DIAGNOSIS — Z13.1 SCREENING FOR DIABETES MELLITUS: ICD-10-CM

## 2024-04-18 DIAGNOSIS — E61.1 IRON DEFICIENCY: Primary | ICD-10-CM

## 2024-04-18 DIAGNOSIS — R14.0 BLOATING: ICD-10-CM

## 2024-04-18 DIAGNOSIS — R14.3 FLATUS: ICD-10-CM

## 2024-04-18 DIAGNOSIS — K59.09 OTHER CONSTIPATION: ICD-10-CM

## 2024-04-18 DIAGNOSIS — Z13.220 SCREENING FOR LIPID DISORDERS: ICD-10-CM

## 2024-04-18 PROCEDURE — 82705 FATS/LIPIDS FECES QUAL: CPT

## 2024-04-18 PROCEDURE — 82653 EL-1 FECAL QUANTITATIVE: CPT

## 2024-04-18 PROCEDURE — 83993 ASSAY FOR CALPROTECTIN FECAL: CPT

## 2024-04-21 LAB
FAT STL QL: NORMAL
NEUTRAL FAT STL QL: NORMAL

## 2024-04-22 LAB
ELASTASE PANC STL-MCNT: 131 UG ELAST./G
TESTOST FREE SERPL-MCNC: 18.4 PG/ML (ref 9.3–26.5)
TESTOST SERPL-MCNC: 404 NG/DL (ref 264–916)

## 2024-04-23 LAB — CALPROTECTIN STL-MCNT: 17 UG/G (ref 0–120)

## 2024-04-24 DIAGNOSIS — K86.89 PANCREATIC INSUFFICIENCY: Primary | ICD-10-CM

## 2024-05-03 ENCOUNTER — OFFICE VISIT (OUTPATIENT)
Dept: PODIATRY | Facility: CLINIC | Age: 19
End: 2024-05-03
Payer: COMMERCIAL

## 2024-05-03 VITALS — DIASTOLIC BLOOD PRESSURE: 78 MMHG | SYSTOLIC BLOOD PRESSURE: 120 MMHG | HEART RATE: 88 BPM

## 2024-05-03 DIAGNOSIS — Q66.89 TARSAL COALITION OF LEFT FOOT: Primary | ICD-10-CM

## 2024-05-03 DIAGNOSIS — M21.41 PES PLANUS OF BOTH FEET: ICD-10-CM

## 2024-05-03 DIAGNOSIS — M21.42 PES PLANUS OF BOTH FEET: ICD-10-CM

## 2024-05-03 PROCEDURE — 99213 OFFICE O/P EST LOW 20 MIN: CPT | Performed by: PODIATRIST

## 2024-05-03 NOTE — PROGRESS NOTES
Assessment/Plan:     Diagnoses and all orders for this visit:    Tarsal coalition of left foot  -     CT lower extremity wo contrast left; Future  -     Ambulatory referral to Physical Therapy; Future    Pes planus of both feet  -     Ambulatory referral to Physical Therapy; Future      Diagnosis and options discussed with patient  Patient agreeable to the plan as stated below    Extensive chart review with his op note and multiple visits with his previous surgeon performed.  I also reviewed his pre and postoperative imaging including x-rays and CT scans preoperatively and his postop images.  Patient has 2 retained screws from his calcaneal talar fusion.  On x-ray the subtalar joint does appear to be fused    Given patient is still having discomfort I think there are 2 reasonable ways to proceed.  First off I would like to perform a repeat CT scan to ensure full fusion of this joint at that there are no issues regarding his surgical outcome.  I suspect that his fusion is complete but he still has a flatfoot deformity and a lot of his achiness seems more related to having pes planus as opposed to there being an issue from his surgery.  I think he would benefit from a custom orthotic which I prescribed.    I will reappoint in 4 to 6 weeks to review CT scan and orthotics.      Subjective:      Patient ID: Mukund Sam is a 18 y.o. male.    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  In July 2023 he underwent fusion of his subtalar joint with another surgeon.  He states it healed well but he still getting some achiness in the foot especially after exercise or activity.  Some days are worse than others.        The following portions of the patient's history were reviewed and updated as appropriate: allergies, current medications, past family history, past medical history, past social history, past surgical history, and problem list.    Review of Systems    As stated in HPI,  otherwise normal      Objective:      /78 (BP Location: Right arm, Patient Position: Sitting, Cuff Size: Standard)   Pulse 88          Physical Exam  Vitals reviewed.   Constitutional:       Appearance: He is not ill-appearing or diaphoretic.   Cardiovascular:      Rate and Rhythm: Normal rate.      Pulses: Normal pulses.   Pulmonary:      Effort: Pulmonary effort is normal. No respiratory distress.   Musculoskeletal:         General: Tenderness (Nonspecific left rear foot pain achiness however it is rather mild this morning as he just woke up.  No specific posterior tibial tendon or Achilles or peroneal pain.) and deformity (Subtalar joint has no range of motion or discernible pain with stress.  His ankle range of motion is normal.  On stance he does show overpronation with prominent talar head medially.) present.   Skin:     Coloration: Pallor: .esbdiag.      Findings: No erythema.   Neurological:      Mental Status: He is alert and oriented to person, place, and time.   Psychiatric:         Mood and Affect: Mood normal.

## 2024-05-03 NOTE — PATIENT INSTRUCTIONS
Ankle Exercises   AMBULATORY CARE:   What you need to know about ankle exercises:  Ankle exercises help strengthen your ankle and improve its function after injury. These are beginning exercises. Ask your healthcare provider if you need to see a physical therapist for more advanced exercises.   General guidelines for ankle exercises:   Do these exercises 3 to 5 days a week, or as directed by your healthcare provider.  Ask if you should do the exercises on each ankle.    Do the exercises in the order that your healthcare provider recommends.  This will help prevent swelling, chronic pain, and reinjury. Start with range of motion exercises. Then move to strengthening exercises, and finally to balancing exercises.    Warm up before you do ankle exercises.  Walk or ride a stationary bike for 5 to 10 minutes to prepare your ankle for movement.    Stop if you feel pain.  It is normal to feel some discomfort at first but you should not feel pain. Tell your doctor or physical therapist if you have pain while you exercise. Regular exercise will help decrease your discomfort over time.    How to perform range of motion exercises safely:  Begin with range of motion exercises to improve flexibility. Ask your healthcare provider when you can progress to strengthening exercises.  Ankle alphabet:  Sit on a chair so that your feet do not touch the floor. Use your big toe to write each letter of the alphabet. Use only your foot and ankle, and keep your movements small. Do 2 sets.         Calf stretches:      Sitting calf stretches with a towel:  Sit on the floor with both legs out straight in front of you. Loop a towel around the ball of your injured foot. Grasp the ends of the towel and pull it toward you. Keep your leg and back straight. Do not lean forward as you pull the towel. Hold for 30 seconds. Then relax for 30 seconds. Do 2 sets of 10.         Standing calf stretches:  Stand facing a wall with the foot that is not injured  forward and your knee slightly bent. Keep the leg with the injured foot straight and behind you with your toes pointed in slightly. With both heels flat on the floor, press your hips forward. Do not arch your back. Hold for 30 seconds, and then relax for 30 seconds. Do 2 sets of 10. Repeat with your leg bent. Do 2 sets of 10.       How to perform strengthening exercises safely:  After you can perform range of motion exercises without pain, you may begin strengthening exercises. Ask your healthcare provider when you can progress to balancing exercises.  Ankle movement in 4 directions:  Sit on the floor with your legs straight in front of you. Keep your heels on the floor for support.    Dorsiflexion:  Begin with your toes pointing straight up. Pull your toes toward your body. Slowly return to the starting position. Do 3 sets of 5.     Plantar flexion:  Begin with your toes pointing straight up. Push your toes away from your body. Slowly return to the starting position. Do 3 sets of 5.         Inversion:  Begin with your toes pointing straight up. Push your toes inward, toward each other. Slowly return to the starting position. Do 3 sets of 5.     Eversion:  Begin with your toes pointing straight up. Push your toes outward, away from each other. Slowly return to the starting position. Do 3 sets of 5.       Toe curls with a towel:  Sit on a chair so that both of your feet are flat on the floor. Place a small towel on the floor in front of your injured foot. Grab the center of the towel with your toes and curl the towel toward you. Relax and repeat. Do 1 set of 5.         Meridian pick-ups:  Sit on a chair so that both of your feet are flat on the floor. Place 20 marbles on the floor in front of your injured foot. Use your toes to  one marble at a time and place it into a bowl. Repeat until you have picked up all the marbles. Do 1 set.     Heel raises:      Single leg heel raises:  Stand with your weight evenly on  both feet. Hold on to a chair or a wall for balance. Lift the foot that is not injured off the floor so all your weight is placed on your injured foot. Raise the heel of your injured foot as high as you can. Slowly lower your heel to the floor. Do 1 set of 10.         Double leg heel raises:  Stand with your weight evenly on both feet. Hold on to a chair or a wall for balance. Raise both of your heels as high as you can. Slowly lower your heels to the floor. Do 1 set of 10.       Heel and toe walks:      Heel walks:  Begin in a standing position. Lift your toes off the floor and walk on your heels. Keep your toes lifted as high as possible. Do 2 sets of 10.         Toe walks:  Begin in a standing position. Lift your heels off the floor and walk on the balls and toes of your feet. Keep your heels lifted as high as possible. Do 2 sets of 10.       How to perform a balance exercise safely:  After you can perform strengthening exercises without pain, you may do this beginning balancing exercise. Ask your healthcare provider for more advanced balance exercises.  Single leg stance:  Stand with your weight evenly on both feet, or hold on to a chair or a wall. Do not lean to the side. Lift the foot that is not injured off the floor so all your weight is placed on your injured foot. Balance on your injured foot. Ask your healthcare provider how long to hold this position.           Call your doctor or physical therapist if:   You have new pain, or your pain becomes worse.    You have questions or concerns about your condition, care, or exercise program.    © Copyright [employer] 2023 Information is for End User's use only and may not be sold, redistributed or otherwise used for commercial purposes.  The above information is an  only. It is not intended as medical advice for individual conditions or treatments. Talk to your doctor, nurse or pharmacist before following any medical regimen to see if it is safe and  effective for you.

## 2024-05-08 ENCOUNTER — HOSPITAL ENCOUNTER (OUTPATIENT)
Dept: CT IMAGING | Facility: HOSPITAL | Age: 19
Discharge: HOME | End: 2024-05-08
Payer: COMMERCIAL

## 2024-05-08 DIAGNOSIS — Q66.89 TARSAL COALITION OF LEFT FOOT: ICD-10-CM

## 2024-05-08 DIAGNOSIS — R14.3 FLATUS: ICD-10-CM

## 2024-05-08 DIAGNOSIS — R10.84 GENERALIZED ABDOMINAL PAIN: ICD-10-CM

## 2024-05-08 DIAGNOSIS — R14.0 BLOATING: ICD-10-CM

## 2024-05-08 DIAGNOSIS — K59.09 OTHER CONSTIPATION: ICD-10-CM

## 2024-05-08 PROCEDURE — 73700 CT LOWER EXTREMITY W/O DYE: CPT

## 2024-05-08 PROCEDURE — 74177 CT ABD & PELVIS W/CONTRAST: CPT

## 2024-05-08 RX ADMIN — IOHEXOL 100 ML: 350 INJECTION, SOLUTION INTRAVENOUS at 20:08

## 2024-05-17 ENCOUNTER — NURSE TRIAGE (OUTPATIENT)
Age: 19
End: 2024-05-17

## 2024-05-17 NOTE — TELEPHONE ENCOUNTER
----- Message from Gracy ZHOU sent at 5/17/2024  1:14 PM EDT -----  Patient calling stating his PCP ordered a CT scan for him because of his Constipation, bloating and discomfort.  He had an xray in March and saw Dr. Jaiyeola  His PCP recommends seeing GI soon as possible

## 2024-05-17 NOTE — TELEPHONE ENCOUNTER
Consult 3/8/24 Dr. Jaiyeola, Procedure EGD 4/10/24, Labs 4/18/24 pancreatic elastase 131, primary noted to repeat in one month, CT A/P moderate amount of stool in colon otherwise normal.  Recommendation from pcp is to begin MiraLax daily for the next week along with increase fiber and water, if no improvement after that time recommend GI follow up.      Returned call, no answer, left message requesting a call back.

## 2024-05-21 NOTE — TELEPHONE ENCOUNTER
Called patient , no answer, left another message requesting a call back and reviewed if I am not available he could speak with any nurse.

## 2024-05-23 NOTE — TELEPHONE ENCOUNTER
Since the patient can not take pills the options are unfortunately limited. The patient can trial over the counter liquid options such as mild of magnesia and mag citrate although this is likely not a good long term options. He can also try adding in prune juice. Lastly we can consider utilizing lactulose however this can sometimes cause the patient more bloating.

## 2024-05-24 NOTE — TELEPHONE ENCOUNTER
Called patient, no answer, left message requesting a call back to review provider recommendations.

## 2024-06-04 ENCOUNTER — APPOINTMENT (OUTPATIENT)
Dept: LAB | Facility: MEDICAL CENTER | Age: 19
End: 2024-06-04

## 2024-06-07 ENCOUNTER — APPOINTMENT (OUTPATIENT)
Age: 19
End: 2024-06-07
Payer: COMMERCIAL

## 2024-06-07 DIAGNOSIS — K86.89 PANCREATIC INSUFFICIENCY: ICD-10-CM

## 2024-06-07 PROCEDURE — 82653 EL-1 FECAL QUANTITATIVE: CPT

## 2024-06-11 ENCOUNTER — TELEPHONE (OUTPATIENT)
Dept: FAMILY MEDICINE CLINIC | Facility: CLINIC | Age: 19
End: 2024-06-11

## 2024-06-11 LAB — ELASTASE PANC STL-MCNT: 785 UG ELAST./G

## 2024-06-13 ENCOUNTER — OFFICE VISIT (OUTPATIENT)
Dept: FAMILY MEDICINE CLINIC | Facility: CLINIC | Age: 19
End: 2024-06-13
Payer: COMMERCIAL

## 2024-06-13 VITALS
HEIGHT: 66 IN | WEIGHT: 146 LBS | BODY MASS INDEX: 23.46 KG/M2 | TEMPERATURE: 99.2 F | HEART RATE: 101 BPM | OXYGEN SATURATION: 97 % | SYSTOLIC BLOOD PRESSURE: 106 MMHG | DIASTOLIC BLOOD PRESSURE: 60 MMHG

## 2024-06-13 DIAGNOSIS — R14.3 FLATUS: ICD-10-CM

## 2024-06-13 DIAGNOSIS — K86.89 PANCREATIC INSUFFICIENCY: Primary | ICD-10-CM

## 2024-06-13 DIAGNOSIS — R14.0 BLOATING: ICD-10-CM

## 2024-06-13 DIAGNOSIS — K59.09 OTHER CONSTIPATION: ICD-10-CM

## 2024-06-13 DIAGNOSIS — R10.84 GENERALIZED ABDOMINAL PAIN: ICD-10-CM

## 2024-06-13 PROCEDURE — 99214 OFFICE O/P EST MOD 30 MIN: CPT | Performed by: NURSE PRACTITIONER

## 2024-06-13 RX ORDER — DICYCLOMINE HYDROCHLORIDE 10 MG/5ML
20 SOLUTION ORAL 4 TIMES DAILY PRN
Qty: 473 ML | Refills: 0 | Status: SHIPPED | OUTPATIENT
Start: 2024-06-13

## 2024-06-13 NOTE — ASSESSMENT & PLAN NOTE
I am unsure of the cause of the patient's continued GI symptoms as he has not noted much improvement since beginning the Creon.  I do feel that patient needs a colonoscopy at this point to rule out any lower GI causes of his symptoms such as microscopic colitis.  In the meantime, the patient's dosage of Bentyl was increased to 20 mg to be used as needed up to 4 times per day.  He was also advised to keep an adequate fiber and water intake.

## 2024-06-13 NOTE — PROGRESS NOTES
Ambulatory Visit  Name: Mukund Sam      : 2005      MRN: [id number]  Encounter Provider: ARA Benjamin  Encounter Date: 2024   Encounter department: [facility] PR[facility]    Assessment & Plan   1. Pancreatic insufficiency  Assessment & Plan:  I am unsure of the cause of the patient's continued GI symptoms as he has not noted much improvement since beginning the Creon.  I do feel that patient needs a colonoscopy at this point to rule out any lower GI causes of his symptoms such as microscopic colitis.  In the meantime, the patient's dosage of Bentyl was increased to 20 mg to be used as needed up to 4 times per day.  He was also advised to keep an adequate fiber and water intake.  2. Flatus  -     dicyclomine (BENTYL) 10 mg/5 mL oral solution; Take 10 mL (20 mg total) by mouth 4 (four) times a day as needed (Abdominal pain or bloating)  3. Generalized abdominal pain  Assessment & Plan:  I am unsure of the cause of the patient's continued GI symptoms as he has not noted much improvement since beginning the Creon.  I do feel that patient needs a colonoscopy at this point to rule out any lower GI causes of his symptoms such as microscopic colitis.  In the meantime, the patient's dosage of Bentyl was increased to 20 mg to be used as needed up to 4 times per day.  He was also advised to keep an adequate fiber and water intake.  Orders:  -     dicyclomine (BENTYL) 10 mg/5 mL oral solution; Take 10 mL (20 mg total) by mouth 4 (four) times a day as needed (Abdominal pain or bloating)  4. Other constipation  Assessment & Plan:  I am unsure of the cause of the patient's continued GI symptoms as he has not noted much improvement since beginning the Creon.  I do feel that patient needs a colonoscopy at this point to rule out any lower GI causes of his symptoms such as microscopic colitis.  In the meantime, the patient's dosage of Bentyl was increased to 20 mg to be used as needed up  to 4 times per day.  He was also advised to keep an adequate fiber and water intake.  Orders:  -     dicyclomine (BENTYL) 10 mg/5 mL oral solution; Take 10 mL (20 mg total) by mouth 4 (four) times a day as needed (Abdominal pain or bloating)  5. Bloating  Assessment & Plan:  I am unsure of the cause of the patient's continued GI symptoms as he has not noted much improvement since beginning the Creon.  I do feel that patient needs a colonoscopy at this point to rule out any lower GI causes of his symptoms such as microscopic colitis.  In the meantime, the patient's dosage of Bentyl was increased to 20 mg to be used as needed up to 4 times per day.  He was also advised to keep an adequate fiber and water intake.  Orders:  -     dicyclomine (BENTYL) 10 mg/5 mL oral solution; Take 10 mL (20 mg total) by mouth 4 (four) times a day as needed (Abdominal pain or bloating)    Depression Screening and Follow-up Plan: Patient was screened for depression during today's encounter. They screened negative with a PHQ-2 score of 0.      History of Present Illness   {Disappearing Hyperlinks I Encounters * My Last Note * Since Last Visit * History :76732}  Pancreatic insufficiency: Patient has been experiencing ongoing symptoms of bloating, increased flatus, nausea, and constipation.  Patient was found to have pancreatic insufficiency and was started on pancreatic enzymes.  Patient reports that he has not noted much improvement in his symptoms since beginning the pancreatic enzymes.  Of note, the patient did have an EGD completed in April 2024 which was normal.  Patient also had a CT of his abdomen and pelvis completed recently which did show constipation but was normal otherwise.  The patient reports that he continues to experience recurrent bloating, increased flatus, and nausea.  The patient has been focusing on getting more fiber and water in his diet so he reports that the constipation has resolved.  He does report that he has  "been having a bowel movement daily and it has not been causing him to strain much.  The patient is scheduled for follow-up with GI again in July 2024.        Review of Systems   Constitutional:  Positive for unexpected weight change (weight loss-3 pounds since last visit). Negative for chills and fever.   HENT:  Negative for ear pain and sore throat.    Eyes:  Negative for pain and visual disturbance.   Respiratory:  Negative for cough, chest tightness, shortness of breath and wheezing.    Cardiovascular:  Negative for chest pain, palpitations and leg swelling.   Gastrointestinal:  Negative for abdominal pain, constipation, diarrhea, nausea and vomiting.        Bloating and increased flatus   Endocrine: Negative for cold intolerance and heat intolerance.   Genitourinary:  Negative for decreased urine volume, dysuria and hematuria.   Musculoskeletal:  Negative for arthralgias, back pain and myalgias.   Skin:  Negative for color change and rash.   Allergic/Immunologic: Negative for environmental allergies.   Neurological:  Negative for dizziness, seizures, syncope, weakness, light-headedness, numbness and headaches.   Hematological:  Negative for adenopathy.   Psychiatric/Behavioral:  Negative for confusion. The patient is not nervous/anxious.    All other systems reviewed and are negative.      Objective   {Disappearing Hyperlinks   Review Vitals * Enter New Vitals * Results Review * Labs * Imaging * Cardiology * Procedures * Lung Cancer Screening :85978}  /60 (BP Location: Right arm, Patient Position: Sitting, Cuff Size: Standard)   Pulse 101   Temp 99.2 °F (37.3 °C) (Tympanic)   Ht 5' 6\" (1.676 m)   Wt 66.2 kg (146 lb)   SpO2 97%   BMI 23.57 kg/m²     Physical Exam  Vitals and nursing note reviewed.   Constitutional:       General: He is not in acute distress.     Appearance: Normal appearance. He is well-developed. He is not ill-appearing.   HENT:      Head: Normocephalic.   Eyes:      " Conjunctiva/sclera: Conjunctivae normal.   Cardiovascular:      Rate and Rhythm: Normal rate and regular rhythm.      Pulses: Normal pulses.           Carotid pulses are 2+ on the right side and 2+ on the left side.       Radial pulses are 2+ on the right side and 2+ on the left side.        Posterior tibial pulses are 2+ on the right side and 2+ on the left side.      Heart sounds: Normal heart sounds. No murmur heard.  Pulmonary:      Effort: Pulmonary effort is normal. No respiratory distress.      Breath sounds: Normal breath sounds. No decreased breath sounds, wheezing, rhonchi or rales.   Abdominal:      General: Abdomen is flat. Bowel sounds are normal. There is no distension.      Palpations: Abdomen is soft.      Tenderness: There is abdominal tenderness (slight) in the left lower quadrant. There is no guarding.   Musculoskeletal:         General: No swelling. Normal range of motion.      Cervical back: Normal range of motion and neck supple.      Right lower leg: No edema.      Left lower leg: No edema.   Skin:     General: Skin is warm and dry.      Capillary Refill: Capillary refill takes less than 2 seconds.   Neurological:      General: No focal deficit present.      Mental Status: He is alert and oriented to person, place, and time.   Psychiatric:         Mood and Affect: Mood normal.         Behavior: Behavior normal.         Thought Content: Thought content normal.         Judgment: Judgment normal.       Administrative Statements {Disappearing Hyperlinks I  [id number]}

## 2024-06-17 ENCOUNTER — TELEPHONE (OUTPATIENT)
Dept: OBGYN CLINIC | Facility: CLINIC | Age: 19
End: 2024-06-17

## 2024-06-17 DIAGNOSIS — K86.89 PANCREATIC INSUFFICIENCY: ICD-10-CM

## 2024-07-10 ENCOUNTER — OFFICE VISIT (OUTPATIENT)
Dept: GASTROENTEROLOGY | Facility: MEDICAL CENTER | Age: 19
End: 2024-07-10
Payer: COMMERCIAL

## 2024-07-10 ENCOUNTER — TELEPHONE (OUTPATIENT)
Dept: GASTROENTEROLOGY | Facility: MEDICAL CENTER | Age: 19
End: 2024-07-10

## 2024-07-10 VITALS
SYSTOLIC BLOOD PRESSURE: 108 MMHG | TEMPERATURE: 99.7 F | OXYGEN SATURATION: 98 % | HEART RATE: 104 BPM | BODY MASS INDEX: 24.01 KG/M2 | DIASTOLIC BLOOD PRESSURE: 66 MMHG | WEIGHT: 149.4 LBS | HEIGHT: 66 IN

## 2024-07-10 DIAGNOSIS — R14.0 BLOATING: Primary | ICD-10-CM

## 2024-07-10 DIAGNOSIS — R19.4 CHANGE IN BOWEL HABITS: ICD-10-CM

## 2024-07-10 PROCEDURE — 99214 OFFICE O/P EST MOD 30 MIN: CPT | Performed by: NURSE PRACTITIONER

## 2024-07-10 RX ORDER — POLYETHYLENE GLYCOL 3350, SODIUM SULFATE ANHYDROUS, SODIUM BICARBONATE, SODIUM CHLORIDE, POTASSIUM CHLORIDE 236; 22.74; 6.74; 5.86; 2.97 G/4L; G/4L; G/4L; G/4L; G/4L
4000 POWDER, FOR SOLUTION ORAL ONCE
Qty: 4000 ML | Refills: 0 | Status: SHIPPED | OUTPATIENT
Start: 2024-07-10 | End: 2024-07-10

## 2024-07-10 RX ORDER — POLYETHYLENE GLYCOL 3350 17 G/17G
238 POWDER, FOR SOLUTION ORAL ONCE
Qty: 238 G | Refills: 0 | Status: SHIPPED | OUTPATIENT
Start: 2024-07-10 | End: 2024-07-10

## 2024-07-10 RX ORDER — DIPHENOXYLATE HYDROCHLORIDE AND ATROPINE SULFATE 2.5; .025 MG/1; MG/1
1 TABLET ORAL DAILY
COMMUNITY

## 2024-07-10 NOTE — PROGRESS NOTES
"[facility] - Outpatient Follow-up Note  Mukund Sam 18 y.o. male MRN: [id number]  Encounter: [id number]          ASSESSMENT AND PLAN:      1.  Elevated TTG  2.  Bloating  3.  Constipation    Continues with generalized abdominal bloating within 5 minutes after eating any foods.  Reports BMs are brown and formed daily.  He has tried MiraLAX daily with no help.  He is taking fiber daily which he thinks is helping his BMs.  Does like he is completely evacuating.  No melena hematochezia.  He saw his PCP and was recommended to have a colonoscopy due to his lower abdominal symptoms.  Recent CT abdomen pelvis was normal.  On a gluten-free diet for several months.  He tried to reintroduce gluten into his diet and his symptoms worsened.  Denies any nausea, vomiting or dysphagia.  No heartburn or reflux.  We did discuss SIBO breath test.  Patient would also like to set up a colonoscopy.  Reports he does not feel like he is \"constipated.\"    -SIBO breath test  -Colonoscopy 2-day prep  -Open office after testing    I obtained informed consent from the patient. The risks/benefits/alternatives of the procedure were discussed with the patient. Risks included, but not limited to, infection, bleeding, perforation, injury to organs in the abdomen, missed lesion and incomplete procedure were discussed. Patient was agreeable and electronic signature was obtained.   ______________________________________________________________________    SUBJECTIVE: 18-year-old male here for follow-up.  He was last seen by Dr. Jaiyeola 3/8/2024 for constipation, hemorrhoids and abnormal celiac panel.    History of elevated TTG of 10, gliadin IgG at 22.  Liver enzymes normal.  Hemoglobin 16.4.  Patient underwent an EGD which notedMild abnormal mucosa in the duodenal bulb second portion of the duodenum with flattening of villi and mild scalloping of the mucosa.'s were negative for celiac and H. pylori.  Patient had " been on a gluten-free diet for 3 months prior to the procedure but also noted no improvement of his symptoms.  Trial of reintroducing gluten back into the diet will be recommended.    Continues with generalized abdominal bloating within 5 minutes after eating any foods.  Reports BMs are brown and formed daily.  He has tried MiraLAX daily with no help.  He is taking fiber daily which he thinks is helping his BMs.  Does like he is completely evacuating.  No melena hematochezia.  He saw his PCP and was recommended to have a colonoscopy due to his lower abdominal symptoms.  Recent CT abdomen pelvis was normal.  On a gluten-free diet for several months.  He tried to reintroduce gluten into his diet and his symptoms worsened.    Prior EGD/colonoscopy     EGD 4/24-ectopic gastric mucosa in upper third of esophagus.  Mild abnormal mucosa in the duodenal bulb second portion of the duodenum with flattening of villi and mild scalloping of the mucosa.'s were negative for celiac and H. pylori.  Patient had been on a gluten-free diet for 3 months prior to the procedure but also noted no improvement of his symptoms.  Trial of reintroducing gluten back into the diet will be recommended.    REVIEW OF SYSTEMS IS OTHERWISE NEGATIVE.  10 point review of systems negative other than per HPI      Historical Information   Past Medical History:   Diagnosis Date   • Anxiety 4/10/2024   • Autism 2004    Dr. 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 BMI 99.3% at 11 year well. Has not had anything other than water yet today so with send for obesity lab panel, will be a fasting sample.   • Verbal apraxia 2004    Dr. Panchal      Past Surgical History:   Procedure Laterality Date   • FRACTURE SURGERY Left     left foot   • NO PAST  "SURGERIES     • PA ARTHRODESIS SUBTALAR Left 07/31/2023    Procedure: ARTHRODESIS / FUSION SUBTALAR JOINT;  Surgeon: Geovanny Soni DPM;  Location:  MAIN OR;  Service: Podiatry     Social History   Social History     Substance and Sexual Activity   Alcohol Use Never     Social History     Substance and Sexual Activity   Drug Use Never     Social History     Tobacco Use   Smoking Status Never   Smokeless Tobacco Never     Family History   Problem Relation Age of Onset   • Learning disabilities Mother         Reading as a child   • Obesity Mother    • Diabetes Mother    • Irritable bowel syndrome Mother    • Bipolar disorder Father    • Depression Father    • Diverticulitis Father    • Anxiety disorder Maternal Grandmother    • Obesity Maternal Grandmother    • Depression Maternal Grandfather    • Obesity Maternal Grandfather    • Obesity Paternal Grandmother    • Cancer Paternal Grandmother    • Cancer Paternal Grandfather    • Anxiety disorder Maternal Aunt    • Bipolar disorder Maternal Aunt    • Depression Maternal Aunt    • Developmental delay Maternal Aunt         Did not talk until 2 years of age   • Obesity Maternal Aunt    • Obesity Paternal Aunt    • Depression Cousin    • Schizoaffective Disorder  Other    • Breast cancer additional onset Other        Meds/Allergies       Current Outpatient Medications:   •  Ferrous Sulfate 300 MG/6.8ML SOLN  •  multivitamin (THERAGRAN) TABS  •  pancrelipase, Lip-Prot-Amyl, (CREON) 6,000 units delayed release capsule  •  polyethylene glycol (Golytely) 4000 mL solution  •  polyethylene glycol (MiraLax) 17 GM/SCOOP powder  •  Vitamin D 12.5 MCG/0.25ML LIQD  •  b complex vitamins capsule  •  dicyclomine (BENTYL) 10 mg/5 mL oral solution  •  polyethylene glycol (MIRALAX) 17 g packet    No Known Allergies        Objective     Blood pressure 108/66, pulse 104, temperature 99.7 °F (37.6 °C), temperature source Tympanic, height 5' 6\" (1.676 m), weight 67.8 kg (149 lb 6.4 oz), " SpO2 98%. Body mass index is 24.11 kg/m².      PHYSICAL EXAM:      General Appearance:   Alert, cooperative, no distress   HEENT:   Normocephalic, atraumatic, anicteric.     Neck:  Supple, symmetrical, trachea midline   Lungs:   Clear to auscultation bilaterally; no rales, rhonchi or wheezing; respirations unlabored    Heart::   Regular rate and rhythm; no murmur, rub, or gallop.   Abdomen:   Soft, non-tender, non-distended; normal bowel sounds; no masses, no organomegaly    Genitalia:   Deferred    Rectal:   Deferred    Extremities:  No cyanosis, clubbing or edema    Pulses:  2+ and symmetric    Skin:  No jaundice, rashes, or lesions    Lymph nodes:  No palpable cervical lymphadenopathy        Lab Results:   No visits with results within 1 Day(s) from this visit.   Latest known visit with results is:   Appointment on 06/07/2024   Component Date Value   • Pancreatic Elastase-1 06/07/2024 785          Radiology Results:   No results found.

## 2024-07-10 NOTE — TELEPHONE ENCOUNTER
Procedure: Colonoscopy  Date: 09/24/2024  Physician performing: Dr. Jaiyeola  Location of procedure:  [place]  Instructions given to patient: 2 Day Prep Golytely  Diabetic: N/A  Clearances: N/A

## 2024-07-11 DIAGNOSIS — K90.49 FOOD INTOLERANCE IN ADULT: Primary | ICD-10-CM

## 2024-07-18 ENCOUNTER — APPOINTMENT (OUTPATIENT)
Dept: LAB | Facility: CLINIC | Age: 19
End: 2024-07-18
Payer: COMMERCIAL

## 2024-07-18 DIAGNOSIS — Z13.1 SCREENING FOR DIABETES MELLITUS: ICD-10-CM

## 2024-07-18 DIAGNOSIS — Z13.220 SCREENING FOR LIPID DISORDERS: ICD-10-CM

## 2024-07-18 DIAGNOSIS — E61.1 IRON DEFICIENCY: ICD-10-CM

## 2024-07-18 LAB
ALBUMIN SERPL BCG-MCNC: 4.4 G/DL (ref 3.5–5)
ALP SERPL-CCNC: 60 U/L (ref 34–104)
ALT SERPL W P-5'-P-CCNC: 25 U/L (ref 7–52)
ANION GAP SERPL CALCULATED.3IONS-SCNC: 8 MMOL/L (ref 4–13)
AST SERPL W P-5'-P-CCNC: 23 U/L (ref 13–39)
BILIRUB SERPL-MCNC: 0.53 MG/DL (ref 0.2–1)
BUN SERPL-MCNC: 17 MG/DL (ref 5–25)
CALCIUM SERPL-MCNC: 9.3 MG/DL (ref 8.4–10.2)
CHLORIDE SERPL-SCNC: 102 MMOL/L (ref 96–108)
CHOLEST SERPL-MCNC: 112 MG/DL
CO2 SERPL-SCNC: 29 MMOL/L (ref 21–32)
CREAT SERPL-MCNC: 0.76 MG/DL (ref 0.6–1.3)
FERRITIN SERPL-MCNC: 81 NG/ML (ref 24–336)
GFR SERPL CREATININE-BSD FRML MDRD: 131 ML/MIN/1.73SQ M
GLUCOSE P FAST SERPL-MCNC: 87 MG/DL (ref 65–99)
HDLC SERPL-MCNC: 47 MG/DL
IRON SATN MFR SERPL: 36 % (ref 15–50)
IRON SERPL-MCNC: 130 UG/DL (ref 50–212)
LDLC SERPL CALC-MCNC: 56 MG/DL (ref 0–100)
NONHDLC SERPL-MCNC: 65 MG/DL
POTASSIUM SERPL-SCNC: 4 MMOL/L (ref 3.5–5.3)
PROT SERPL-MCNC: 7.1 G/DL (ref 6.4–8.4)
SODIUM SERPL-SCNC: 139 MMOL/L (ref 135–147)
TIBC SERPL-MCNC: 358 UG/DL (ref 250–450)
TRIGL SERPL-MCNC: 44 MG/DL
UIBC SERPL-MCNC: 228 UG/DL (ref 155–355)

## 2024-07-18 PROCEDURE — 83550 IRON BINDING TEST: CPT

## 2024-07-18 PROCEDURE — 83540 ASSAY OF IRON: CPT

## 2024-07-18 PROCEDURE — 80053 COMPREHEN METABOLIC PANEL: CPT

## 2024-07-18 PROCEDURE — 36415 COLL VENOUS BLD VENIPUNCTURE: CPT

## 2024-07-18 PROCEDURE — 82728 ASSAY OF FERRITIN: CPT

## 2024-07-18 PROCEDURE — 80061 LIPID PANEL: CPT

## 2024-07-23 ENCOUNTER — OFFICE VISIT (OUTPATIENT)
Dept: FAMILY MEDICINE CLINIC | Facility: CLINIC | Age: 19
End: 2024-07-23
Payer: COMMERCIAL

## 2024-07-23 VITALS
HEIGHT: 66 IN | OXYGEN SATURATION: 98 % | DIASTOLIC BLOOD PRESSURE: 62 MMHG | BODY MASS INDEX: 23.08 KG/M2 | WEIGHT: 143.6 LBS | HEART RATE: 97 BPM | SYSTOLIC BLOOD PRESSURE: 100 MMHG

## 2024-07-23 DIAGNOSIS — R14.0 BLOATING: Primary | ICD-10-CM

## 2024-07-23 DIAGNOSIS — E61.1 IRON DEFICIENCY: ICD-10-CM

## 2024-07-23 PROCEDURE — 99214 OFFICE O/P EST MOD 30 MIN: CPT | Performed by: NURSE PRACTITIONER

## 2024-07-23 RX ORDER — GINGER ROOT 550 MG
CAPSULE ORAL
COMMUNITY
Start: 2024-07-20

## 2024-07-23 NOTE — ASSESSMENT & PLAN NOTE
Well-controlled on current regimen.  The patient reports that he has been attempting to eat more iron rich foods in his diet.

## 2024-07-23 NOTE — ASSESSMENT & PLAN NOTE
Patient continues to follow with GI regarding this and is scheduled for upcoming colonoscopy.  I did advise patient to continue to follow a fiber rich diet, consume water regularly, and to exercise regularly.

## 2024-07-23 NOTE — PROGRESS NOTES
Ambulatory Visit  Name: Mukund Sam      : 2005      MRN: [id number]  Encounter Provider: ARA Benjamin  Encounter Date: 2024   Encounter department: [facility]    Assessment & Plan   1. Bloating  Assessment & Plan:  Patient continues to follow with GI regarding this and is scheduled for upcoming colonoscopy.  I did advise patient to continue to follow a fiber rich diet, consume water regularly, and to exercise regularly.  2. Iron deficiency  Assessment & Plan:  Well-controlled on current regimen.  The patient reports that he has been attempting to eat more iron rich foods in his diet.       History of Present Illness     Bloating: Patient has been having ongoing issues with postprandial bloating.  Patient has been following with GI regarding this and per their most recent note they did order a small intestinal bacterial overgrowth breath test and also scheduled the patient for a colonoscopy for further evaluation of his symptoms.  Patient previously had blood work completed which showed that he likely has a gluten intolerance and he also had an EGD completed previously which was normal.  The patient does report that he has been eating more fiber rich foods recently and taking a daily fiber supplement and this has seemed to somewhat improve his symptoms.  He denies any current constipation but does still report intermittent episodes of postprandial bloating.  Patient also denies any diarrhea, nausea, vomiting, hematochezia, or melena.  Patient also reports that he does have an upcoming office visit scheduled with an allergist as well.    Iron deficiency: Patient is managed on a daily iron supplement.  Patient's most recent iron panel was noted to be normal.        Review of Systems   Constitutional:  Negative for chills and fever.   HENT:  Negative for ear pain and sore throat.    Eyes:  Negative for pain and visual disturbance.   Respiratory:  Negative for  "cough, chest tightness, shortness of breath and wheezing.    Cardiovascular:  Negative for chest pain, palpitations and leg swelling.   Gastrointestinal:  Negative for abdominal pain, constipation, diarrhea, nausea and vomiting.        Postprandial bloating   Endocrine: Negative for cold intolerance and heat intolerance.   Genitourinary:  Negative for decreased urine volume, dysuria and hematuria.   Musculoskeletal:  Negative for arthralgias, back pain and myalgias.   Skin:  Negative for color change and rash.   Allergic/Immunologic: Negative for environmental allergies.   Neurological:  Negative for dizziness, seizures, syncope, weakness, light-headedness, numbness and headaches.   Hematological:  Negative for adenopathy.   Psychiatric/Behavioral:  Negative for confusion. The patient is not nervous/anxious.    All other systems reviewed and are negative.      Objective     /62 (BP Location: Right arm, Patient Position: Sitting, Cuff Size: Standard)   Pulse 97   Ht 5' 6\" (1.676 m)   Wt 65.1 kg (143 lb 9.6 oz)   SpO2 98%   BMI 23.18 kg/m²     Physical Exam  Vitals and nursing note reviewed.   Constitutional:       General: He is not in acute distress.     Appearance: Normal appearance. He is well-developed. He is not ill-appearing.   HENT:      Head: Normocephalic.   Eyes:      Conjunctiva/sclera: Conjunctivae normal.   Cardiovascular:      Rate and Rhythm: Normal rate and regular rhythm.      Pulses: Normal pulses.           Carotid pulses are 2+ on the right side and 2+ on the left side.       Radial pulses are 2+ on the right side and 2+ on the left side.        Posterior tibial pulses are 2+ on the right side and 2+ on the left side.      Heart sounds: Normal heart sounds. No murmur heard.  Pulmonary:      Effort: Pulmonary effort is normal. No respiratory distress.      Breath sounds: Normal breath sounds. No decreased breath sounds, wheezing, rhonchi or rales.   Abdominal:      General: Abdomen is " flat. Bowel sounds are normal. There is no distension.      Palpations: Abdomen is soft.      Tenderness: There is abdominal tenderness in the right lower quadrant. There is no guarding.   Musculoskeletal:         General: No swelling. Normal range of motion.      Cervical back: Normal range of motion and neck supple.      Right lower leg: No edema.      Left lower leg: No edema.   Skin:     General: Skin is warm and dry.      Capillary Refill: Capillary refill takes less than 2 seconds.   Neurological:      General: No focal deficit present.      Mental Status: He is alert and oriented to person, place, and time.   Psychiatric:         Mood and Affect: Mood normal.         Behavior: Behavior normal.         Thought Content: Thought content normal.         Judgment: Judgment normal.       Administrative Statements

## 2024-08-05 ENCOUNTER — TELEPHONE (OUTPATIENT)
Age: 19
End: 2024-08-05

## 2024-08-05 NOTE — TELEPHONE ENCOUNTER
Patients GI provider:  Dr. Jaiyeola, HELLEN Adams    Number to return call: [phone]    Reason for call: Pt calling to inquire about dropping off SIBO test and if apt is required. Spoke w/Naya at [facility] clerical, advised ok to drop off. Relayed info to pt, pt confirmed understanding.     Scheduled procedure/appointment date if applicable: procedure 09/24/2024

## 2024-08-06 ENCOUNTER — OFFICE VISIT (OUTPATIENT)
Dept: GASTROENTEROLOGY | Facility: CLINIC | Age: 19
End: 2024-08-06
Payer: COMMERCIAL

## 2024-08-06 DIAGNOSIS — K59.00 CONSTIPATION, UNSPECIFIED CONSTIPATION TYPE: ICD-10-CM

## 2024-08-06 DIAGNOSIS — R14.0 BLOATING: Primary | ICD-10-CM

## 2024-08-06 PROCEDURE — 91065 BREATH HYDROGEN/METHANE TEST: CPT | Performed by: INTERNAL MEDICINE

## 2024-08-06 NOTE — PROGRESS NOTES
[facility]       Bacterial Overgrowth Analytical Record    Mukund Sam 18 y.o. male MRN: [id number]      Date of Test: 08/04/24    Substrate Given: Lactulose    Ordering Provider: Yvette BULLOCK    Medical Assistant: Criselda Boothe MA    Symptoms: constipation,bloating    The patient presents for bacterial overgrowth testing.    Patient fasted overnight. Baseline readings obtained.   Breath test performed every 20 min for a total of 3 hr    Sample Clock Time ppmH2 ppmCH4 Co2% Essie   Baseline   8:00   2   0   3.5   1.57   #1  20 minutes   8:20   6   10   1.7   3.23   #2  40 minutes   8:40   1   2   0.3   Too high   #3  60 minutes   9:00   1   2   0.0   Too high   #4  80 minutes   9:20   14   9   3.5   1.57   #5  100 minutes   9:40   1   2   0.0   Too high   #6  120 minutes   10:00   3   3   0.3   Too high   #7  140 minutes   10:20   2   3   0.2   Too high   #8  160 minutes   10:40   4   5   0.4   Too high   #9  180 minutes   11:00   23   12   3.3   1.66       Physician interpretation: High correlation values which may indicate inaccurate testing. Possible intestinal methanogen overgrowth with 2 values of CH4 >10 ppm; consider re-testing vs. Empiric treatment with antibiotics if high suspicion for IMO contributing to symptoms.

## 2024-08-08 ENCOUNTER — TELEPHONE (OUTPATIENT)
Age: 19
End: 2024-08-08

## 2024-08-08 DIAGNOSIS — K63.8219 SMALL INTESTINAL BACTERIAL OVERGROWTH (SIBO): Primary | ICD-10-CM

## 2024-08-08 RX ORDER — NEOMYCIN SULFATE 500 MG/1
1000 TABLET ORAL
Qty: 56 TABLET | Refills: 0 | Status: SHIPPED | OUTPATIENT
Start: 2024-08-08 | End: 2024-08-22

## 2024-08-08 NOTE — TELEPHONE ENCOUNTER
[facility] calling in with the xifaxin approval.  Xifaxin approval number [id number]    Good for two weeks. 42 tablets for 14 days.  [facility] although on our end it does appear that it was sent.

## 2024-08-08 NOTE — TELEPHONE ENCOUNTER
PA for Xifaxan SUBMITTED     via    []CMM-KEY   []Surescripts-Case ID #   []Faxed to plan   [x]Other website Prompt CHRISTIAN Gonzalez, EOC ID [id number]   []Phone call Case ID #     Office notes sent, clinical questions answered. Awaiting determination    Turnaround time for your insurance to make a decision on your Prior Authorization can take 7-21 business days.

## 2024-08-09 NOTE — TELEPHONE ENCOUNTER
PA for Xifaxan Approved     Date(s) approved until 8/22/2024    Case #    Patient advised by          [x] Waizyhart Message  [] Phone call   [x]LMOM  []L/M to call office as no active Communication consent on file  []Unable to leave detailed message as VM not approved on Communication consent       Pharmacy advised by    [x]Fax  []Phone call    Approval letter scanned into [employer] Yes

## 2024-08-13 ENCOUNTER — TELEPHONE (OUTPATIENT)
Dept: GASTROENTEROLOGY | Facility: MEDICAL CENTER | Age: 19
End: 2024-08-13

## 2024-08-20 ENCOUNTER — TELEPHONE (OUTPATIENT)
Dept: GASTROENTEROLOGY | Facility: CLINIC | Age: 19
End: 2024-08-20

## 2024-08-20 NOTE — TELEPHONE ENCOUNTER
Called and spoke to patient to confirm procedure for 09/24/2024 with Dr. Jaiyeola,patient has confirmed and requested prep instructions be sent via [employer]

## 2024-08-29 DIAGNOSIS — R14.0 BLOATING: ICD-10-CM

## 2024-08-29 DIAGNOSIS — R10.84 GENERALIZED ABDOMINAL PAIN: Primary | ICD-10-CM

## 2024-09-03 DIAGNOSIS — Z91.89 AT RISK FOR IMPAIRED DIGESTION: Primary | ICD-10-CM

## 2024-09-06 ENCOUNTER — TELEPHONE (OUTPATIENT)
Age: 19
End: 2024-09-06

## 2024-09-06 NOTE — TELEPHONE ENCOUNTER
PA for linaCLOtide 72 MCG CAPS SUBMITTED     via    [x]CMM-KEY: [id number]  []Surescripts-Case ID #   []Faxed to plan   []Other website   []Phone call Case ID #     Office notes sent, clinical questions answered. Awaiting determination    Turnaround time for your insurance to make a decision on your Prior Authorization can take 7-21 business days.

## 2024-09-10 ENCOUNTER — ANESTHESIA EVENT (OUTPATIENT)
Dept: ANESTHESIOLOGY | Facility: HOSPITAL | Age: 19
End: 2024-09-10

## 2024-09-10 ENCOUNTER — ANESTHESIA (OUTPATIENT)
Dept: ANESTHESIOLOGY | Facility: HOSPITAL | Age: 19
End: 2024-09-10

## 2024-09-20 ENCOUNTER — TELEPHONE (OUTPATIENT)
Dept: GASTROENTEROLOGY | Facility: MEDICAL CENTER | Age: 19
End: 2024-09-20

## 2024-09-24 ENCOUNTER — ANESTHESIA EVENT (OUTPATIENT)
Dept: GASTROENTEROLOGY | Facility: MEDICAL CENTER | Age: 19
End: 2024-09-24
Payer: COMMERCIAL

## 2024-09-24 ENCOUNTER — ANESTHESIA (OUTPATIENT)
Dept: GASTROENTEROLOGY | Facility: MEDICAL CENTER | Age: 19
End: 2024-09-24
Payer: COMMERCIAL

## 2024-09-24 ENCOUNTER — HOSPITAL ENCOUNTER (OUTPATIENT)
Dept: GASTROENTEROLOGY | Facility: MEDICAL CENTER | Age: 19
Setting detail: OUTPATIENT SURGERY
Discharge: HOME | End: 2024-09-24
Payer: COMMERCIAL

## 2024-09-24 VITALS
SYSTOLIC BLOOD PRESSURE: 107 MMHG | HEART RATE: 85 BPM | RESPIRATION RATE: 16 BRPM | OXYGEN SATURATION: 99 % | TEMPERATURE: 99.4 F | DIASTOLIC BLOOD PRESSURE: 55 MMHG

## 2024-09-24 DIAGNOSIS — R19.4 CHANGE IN BOWEL HABITS: ICD-10-CM

## 2024-09-24 DIAGNOSIS — R14.0 BLOATING: ICD-10-CM

## 2024-09-24 PROCEDURE — 45385 COLONOSCOPY W/LESION REMOVAL: CPT | Performed by: INTERNAL MEDICINE

## 2024-09-24 PROCEDURE — 45380 COLONOSCOPY AND BIOPSY: CPT | Performed by: INTERNAL MEDICINE

## 2024-09-24 PROCEDURE — 88305 TISSUE EXAM BY PATHOLOGIST: CPT | Performed by: PATHOLOGY

## 2024-09-24 RX ORDER — SIMETHICONE 40MG/0.6ML
SUSPENSION, DROPS(FINAL DOSAGE FORM)(ML) ORAL AS NEEDED
Status: COMPLETED | OUTPATIENT
Start: 2024-09-24 | End: 2024-09-24

## 2024-09-24 RX ORDER — LIDOCAINE HYDROCHLORIDE 20 MG/ML
INJECTION, SOLUTION EPIDURAL; INFILTRATION; INTRACAUDAL; PERINEURAL AS NEEDED
Status: DISCONTINUED | OUTPATIENT
Start: 2024-09-24 | End: 2024-09-24

## 2024-09-24 RX ORDER — PHENYLEPHRINE HCL IN 0.9% NACL 1 MG/10 ML
SYRINGE (ML) INTRAVENOUS AS NEEDED
Status: DISCONTINUED | OUTPATIENT
Start: 2024-09-24 | End: 2024-09-24

## 2024-09-24 RX ORDER — SODIUM CHLORIDE 9 MG/ML
125 INJECTION, SOLUTION INTRAVENOUS CONTINUOUS
Status: DISCONTINUED | OUTPATIENT
Start: 2024-09-24 | End: 2024-09-28 | Stop reason: HOSPADM

## 2024-09-24 RX ORDER — PROPOFOL 10 MG/ML
INJECTION, EMULSION INTRAVENOUS AS NEEDED
Status: DISCONTINUED | OUTPATIENT
Start: 2024-09-24 | End: 2024-09-24

## 2024-09-24 RX ADMIN — PROPOFOL 50 MG: 10 INJECTION, EMULSION INTRAVENOUS at 14:20

## 2024-09-24 RX ADMIN — Medication 100 MCG: at 14:30

## 2024-09-24 RX ADMIN — PROPOFOL 50 MG: 10 INJECTION, EMULSION INTRAVENOUS at 14:19

## 2024-09-24 RX ADMIN — PROPOFOL 150 MG: 10 INJECTION, EMULSION INTRAVENOUS at 14:16

## 2024-09-24 RX ADMIN — SODIUM CHLORIDE 125 ML/HR: 0.9 INJECTION, SOLUTION INTRAVENOUS at 14:11

## 2024-09-24 RX ADMIN — Medication 40 MG: at 14:21

## 2024-09-24 RX ADMIN — PROPOFOL 50 MG: 10 INJECTION, EMULSION INTRAVENOUS at 14:17

## 2024-09-24 RX ADMIN — PROPOFOL 50 MG: 10 INJECTION, EMULSION INTRAVENOUS at 14:18

## 2024-09-24 RX ADMIN — LIDOCAINE HYDROCHLORIDE 100 MG: 20 INJECTION, SOLUTION EPIDURAL; INFILTRATION; INTRACAUDAL at 14:16

## 2024-09-24 NOTE — TELEPHONE ENCOUNTER
Pt called in to confirm the time for his procedure today advise 1:30 pm he said he will be there between 1 - 1:15 pm

## 2024-09-24 NOTE — H&P
H&P - Gastroenterology   Name: Mukund Sam 19 y.o. male I MRN: [id number]  Unit/Bed#:  I Date of Admission: 9/24/2024   Date of Service: 9/24/2024 I Hospital Day: 0     Assessment & Plan   This is a 19 y.o. year old male here for colonoscopy, and he is stable and optimized for his procedure.    History of Present Illness    Mukund Sam is a 19 y.o. year old male who presents for change in bowel habits, bloating    REVIEW OF SYSTEMS: Per the HPI, and otherwise unremarkable.    Historical Information   Past Medical History:   Diagnosis Date    Anxiety 4/10/2024    Autism 2004    Dr. 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 BMI 99.3% at 11 year well. Has not had anything other than water yet today so with send for obesity lab panel, will be a fasting sample.    Verbal apraxia 2004    Dr. Panchal      Past Surgical History:   Procedure Laterality Date    FRACTURE SURGERY Left     left foot    NO PAST SURGERIES      RI ARTHRODESIS SUBTALAR Left 07/31/2023    Procedure: ARTHRODESIS / FUSION SUBTALAR JOINT;  Surgeon: Geovanny Soni DPM;  Location: [facility];  Service: Podiatry     Social History     Tobacco Use    Smoking status: Never    Smokeless tobacco: Never   Vaping Use    Vaping status: Never Used   Substance and Sexual Activity    Alcohol use: Never    Drug use: Never    Sexual activity: Never     E-Cigarette/Vaping    E-Cigarette Use Never User      E-Cigarette/Vaping Substances    Nicotine No     THC No     CBD No     Flavoring No     Other No     Unknown No      Family history non-contributory    Meds/Allergies     Current Outpatient Medications:     linaCLOtide 72 MCG CAPS    pancrelipase, Lip-Prot-Amyl, (CREON) 6,000 units delayed release capsule    Ferrous Sulfate 300 MG/6.8ML  SOLN    [place], Zingiber officinalis, ([place] Root) 550 MG CAPS    multivitamin (THERAGRAN) TABS    polyethylene glycol (Golytely) 4000 mL solution    Vitamin D 12.5 MCG/0.25ML LIQD    Current Facility-Administered Medications:     sodium chloride 0.9 % infusion, 125 mL/hr, Intravenous, Continuous  No Known Allergies    Objective   There were no vitals taken for this visit.    Physical Exam  Gen: NAD  Head: NCAT  CV: RRR  CHEST: Clear  ABD: soft, NT/ND  EXT: no edema

## 2024-09-24 NOTE — ANESTHESIA POSTPROCEDURE EVALUATION
Post-Op Assessment Note    CV Status:  Stable    Pain management: satisfactory to patient       Mental Status:  Alert and awake   Hydration Status:  Stable   PONV Controlled:  None   Airway Patency:  Patent     Post Op Vitals Reviewed: Yes    No anethesia notable event occurred.    Staff: CRNA               BP   101/51   Temp      Pulse  86   Resp   18   SpO2   100

## 2024-09-24 NOTE — ANESTHESIA PREPROCEDURE EVALUATION
Procedure:  COLONOSCOPY    Relevant Problems   GI/HEPATIC   (+) Pancreatic insufficiency      NEURO/PSYCH   (+) Anxiety   (+) Chronic foot pain, left   (+) Chronic pain of left ankle   (+) VIV (generalized anxiety disorder)      Behavioral Health   (+) Autism spectrum disorder-high functioning      Neurology/Sleep   (+) Central auditory processing disorder (CAPD)      Surgery/Wound/Pain   (+) Generalized abdominal pain        Physical Exam    Airway    Mallampati score: II  TM Distance: >3 FB       Dental   No notable dental hx     Cardiovascular  Cardiovascular exam normal    Pulmonary  Pulmonary exam normal     Other Findings        Anesthesia Plan  ASA Score- 2     Anesthesia Type- IV sedation with anesthesia with ASA Monitors.         Additional Monitors:     Airway Plan:            Plan Factors-Exercise tolerance (METS): >4 METS.    Chart reviewed.        Patient is not a current smoker.      Obstructive sleep apnea risk education given perioperatively.        Induction- intravenous.    Postoperative Plan-         Informed Consent- Anesthetic plan and risks discussed with patient.

## 2024-09-26 PROCEDURE — 88305 TISSUE EXAM BY PATHOLOGIST: CPT | Performed by: PATHOLOGY

## 2024-09-27 DIAGNOSIS — R10.84 GENERALIZED ABDOMINAL PAIN: Primary | ICD-10-CM

## 2024-09-27 NOTE — RESULT ENCOUNTER NOTE
[facility]  One ssa. Otherwise normal pathology. Repeat colonoscopy in 3 years

## 2024-09-30 ENCOUNTER — APPOINTMENT (OUTPATIENT)
Dept: LAB | Facility: MEDICAL CENTER | Age: 19
End: 2024-09-30
Payer: COMMERCIAL

## 2024-09-30 DIAGNOSIS — R10.84 GENERALIZED ABDOMINAL PAIN: ICD-10-CM

## 2024-09-30 LAB — CORTIS SERPL-MCNC: 20 UG/DL

## 2024-09-30 PROCEDURE — 82533 TOTAL CORTISOL: CPT

## 2024-09-30 PROCEDURE — 36415 COLL VENOUS BLD VENIPUNCTURE: CPT

## 2024-10-01 ENCOUNTER — OFFICE VISIT (OUTPATIENT)
Dept: FAMILY MEDICINE CLINIC | Facility: CLINIC | Age: 19
End: 2024-10-01
Payer: COMMERCIAL

## 2024-10-01 VITALS
RESPIRATION RATE: 16 BRPM | HEART RATE: 100 BPM | HEIGHT: 66 IN | TEMPERATURE: 99.4 F | DIASTOLIC BLOOD PRESSURE: 60 MMHG | OXYGEN SATURATION: 98 % | SYSTOLIC BLOOD PRESSURE: 114 MMHG | WEIGHT: 151.8 LBS | BODY MASS INDEX: 24.4 KG/M2

## 2024-10-01 DIAGNOSIS — R14.0 BLOATING: Primary | ICD-10-CM

## 2024-10-01 DIAGNOSIS — K86.89 PANCREATIC INSUFFICIENCY: ICD-10-CM

## 2024-10-01 DIAGNOSIS — F41.1 GAD (GENERALIZED ANXIETY DISORDER): ICD-10-CM

## 2024-10-01 PROCEDURE — 99214 OFFICE O/P EST MOD 30 MIN: CPT | Performed by: NURSE PRACTITIONER

## 2024-10-01 RX ORDER — BUPROPION HYDROCHLORIDE 150 MG/1
150 TABLET ORAL EVERY MORNING
Qty: 30 TABLET | Refills: 1 | Status: SHIPPED | OUTPATIENT
Start: 2024-10-01

## 2024-10-01 NOTE — ASSESSMENT & PLAN NOTE
I did advise the patient that I feel that since all of his testing up until this point has been mostly normal his symptoms are likely related to IBS.  He does have upcoming follow-up scheduled with GI to discuss this diagnosis further as well.

## 2024-10-01 NOTE — PROGRESS NOTES
Ambulatory Visit  Name: Mukund Sam      : 2005      MRN: [id number]  Encounter Provider: ARA Benjamin  Encounter Date: 10/1/2024   Encounter department: [facility]    Assessment & Plan  Bloating  I did advise the patient that I feel that since all of his testing up until this point has been mostly normal his symptoms are likely related to IBS.  He does have upcoming follow-up scheduled with GI to discuss this diagnosis further as well.         VIV (generalized anxiety disorder)  Patient will be trialed on Wellbutrin 150 mg daily to hopefully better control his anxiety symptoms.  I did advise the patient that it can take 2 to 4 weeks to see true effect from the medication.  I will reassess the patient's symptoms at his follow-up visit.  Orders:    buPROPion (WELLBUTRIN XL) 150 mg 24 hr tablet; Take 1 tablet (150 mg total) by mouth every morning    Pancreatic insufficiency  Patient continues to follow with GI regarding this.            History of Present Illness     Bloating: Patient has been following with GI for ongoing postprandial bloating.  Patient has had an EGD and colonoscopy completed at this point and both were noted to be normal.  Patient is also scheduled for a gastric emptying study on 10/28/2024.  The patient has been trialed on multiple different medications for his symptoms including MiraLAX, Metamucil, Bentyl, and Linzess with no improvement in his symptoms.    Pancreatic insufficiency: Patient is currently managed on pancreatic enzymes but he reports this has not improved his bloating symptoms much.    Anxiety: Patient does have a longstanding history of anxiety.  He was trialed on Lexapro a few years ago but this was discontinued due to side effects.  The patient feels that his ongoing anxiety could be contributing to his GI symptoms and he would like to try another anxiolytic medication.          Review of Systems   Constitutional:  Negative for  "appetite change, chills and fever.   HENT:  Negative for ear pain and sore throat.    Eyes:  Negative for pain and visual disturbance.   Respiratory:  Negative for cough, chest tightness and shortness of breath.    Cardiovascular:  Negative for chest pain, palpitations and leg swelling.   Gastrointestinal:  Negative for abdominal distention, abdominal pain, constipation, diarrhea, nausea and vomiting.        Recurrent postprandial bloating   Endocrine: Negative for cold intolerance and heat intolerance.   Genitourinary:  Negative for decreased urine volume, difficulty urinating, dysuria and hematuria.   Musculoskeletal:  Negative for arthralgias, back pain and myalgias.   Skin:  Negative for color change, rash and wound.   Allergic/Immunologic: Negative for environmental allergies.   Neurological:  Negative for dizziness, seizures, syncope, light-headedness and headaches.   Hematological:  Negative for adenopathy.   Psychiatric/Behavioral:  Negative for dysphoric mood and sleep disturbance. The patient is nervous/anxious.    All other systems reviewed and are negative.          Objective     /60 (BP Location: Right arm, Patient Position: Sitting, Cuff Size: Adult)   Pulse 100   Temp 99.4 °F (37.4 °C) (Temporal)   Resp 16   Ht 5' 6\" (1.676 m)   Wt 68.9 kg (151 lb 12.8 oz)   SpO2 98%   BMI 24.50 kg/m²     Physical Exam  Vitals and nursing note reviewed.   Constitutional:       General: He is not in acute distress.     Appearance: Normal appearance. He is well-developed. He is not ill-appearing.   HENT:      Head: Normocephalic.   Eyes:      Conjunctiva/sclera: Conjunctivae normal.   Cardiovascular:      Rate and Rhythm: Normal rate and regular rhythm.      Pulses: Normal pulses.           Carotid pulses are 2+ on the right side and 2+ on the left side.       Radial pulses are 2+ on the right side and 2+ on the left side.        Posterior tibial pulses are 2+ on the right side and 2+ on the left side.      " Heart sounds: Normal heart sounds. No murmur heard.  Pulmonary:      Effort: Pulmonary effort is normal. No respiratory distress.      Breath sounds: Normal breath sounds. No decreased breath sounds, wheezing, rhonchi or rales.   Abdominal:      General: Abdomen is flat. Bowel sounds are normal. There is no distension.      Palpations: Abdomen is soft.      Tenderness: There is no abdominal tenderness. There is no guarding.   Musculoskeletal:         General: No swelling. Normal range of motion.      Cervical back: Normal range of motion and neck supple.      Right lower leg: No edema.      Left lower leg: No edema.   Skin:     General: Skin is warm and dry.      Capillary Refill: Capillary refill takes less than 2 seconds.   Neurological:      General: No focal deficit present.      Mental Status: He is alert and oriented to person, place, and time.   Psychiatric:         Mood and Affect: Mood normal.         Behavior: Behavior normal.         Thought Content: Thought content normal.         Judgment: Judgment normal.

## 2024-10-01 NOTE — ASSESSMENT & PLAN NOTE
Patient will be trialed on Wellbutrin 150 mg daily to hopefully better control his anxiety symptoms.  I did advise the patient that it can take 2 to 4 weeks to see true effect from the medication.  I will reassess the patient's symptoms at his follow-up visit.  Orders:    buPROPion (WELLBUTRIN XL) 150 mg 24 hr tablet; Take 1 tablet (150 mg total) by mouth every morning

## 2024-10-07 ENCOUNTER — OFFICE VISIT (OUTPATIENT)
Dept: GASTROENTEROLOGY | Facility: MEDICAL CENTER | Age: 19
End: 2024-10-07
Payer: COMMERCIAL

## 2024-10-07 VITALS
TEMPERATURE: 99.1 F | HEART RATE: 102 BPM | HEIGHT: 66 IN | WEIGHT: 152 LBS | DIASTOLIC BLOOD PRESSURE: 80 MMHG | SYSTOLIC BLOOD PRESSURE: 121 MMHG | BODY MASS INDEX: 24.43 KG/M2 | OXYGEN SATURATION: 98 %

## 2024-10-07 DIAGNOSIS — R14.0 ABDOMINAL BLOATING: Primary | ICD-10-CM

## 2024-10-07 DIAGNOSIS — K59.00 CONSTIPATION, UNSPECIFIED CONSTIPATION TYPE: ICD-10-CM

## 2024-10-07 PROCEDURE — 99214 OFFICE O/P EST MOD 30 MIN: CPT | Performed by: NURSE PRACTITIONER

## 2024-10-07 RX ORDER — LINACLOTIDE 72 UG/1
72 CAPSULE, GELATIN COATED ORAL DAILY
COMMUNITY

## 2024-10-07 NOTE — PROGRESS NOTES
[facility] - Outpatient Follow-up Note  Mukund Sam 19 y.o. male MRN: [id number]  Encounter: [id number]          ASSESSMENT AND PLAN:      1.  Abdominal bloating  2.  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  Patient was treated with 14 days of neomycin and Xifaxan.  Reports he felt no different on treatment and after treatment.  He is scheduled for a gastric emptying study at the end of the month.  Currently taking Creon 6000 units before each meal that was ordered by his PCP for EPI.  BMs are brown and formed daily to every other day.  He continues with abdominal bloating within a few minutes after eating any meal.  Recent colonoscopy noted 1 SSA and hemorrhoids.  Repeat colonoscopy in 3 years recommended.  No nausea or vomiting.  He has been on a gluten-free and dairy free diet with no change in symptoms.  Did have an elevated TTG of 10.  EGD did note flattened villi and mild scalloping of the duodenal mucosa but biopsies were negative for celiac and H. pylori.  Patient had been on a gluten-free diet for 3 months prior to this procedure and did not note any improvement of his symptoms.  He is slowly trying to reintroduce gluten back into his diet.  He has tried MiraLAX daily which has helped somewhat with constipation.    -Await gastric emptying study results  -Follow-up with Dr. Jaiyeola in the next few months  -Continue reintroducing gluten back in diet        __________________________________________________  ____________________    SUBJECTIVE: 19-year-old male here for follow-up.  He was last seen by myself 7/10/2024 for an elevated TTG, bloating and constipation.    History of elevated TTG of 10, gliadin IgG at 22. Liver enzymes normal. Hemoglobin 16.4. Patient underwent an EGD which notedMild abnormal mucosa in the duodenal bulb second portion of the duodenum with flattening of villi and mild scalloping of the  mucosa.'s were negative for celiac and H. pylori. Patient had been on a gluten-free diet for 3 months prior to the procedure but also noted no improvement of his symptoms. Trial of reintroducing gluten back into the diet will be recommended.     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  Patient was treated with 14 days of neomycin and Xifaxan.  Reports he felt no different on treatment and after treatment.  He is scheduled for a gastric emptying study at the end of the month.  Currently taking Creon 6000 units before each meal that was ordered by his PCP for EPI.  BMs are brown and formed daily to every other day.  He continues with abdominal bloating within a few minutes after eating any meal.  Recent colonoscopy noted 1 SSA and hemorrhoids.  Repeat colonoscopy in 3 years recommended.  No nausea or vomiting.  He has been on a gluten-free and dairy free diet with no change in symptoms.    Interval history:    Prior EGD/colonoscopy     Colonoscopy 9/24-mild nodular mucosa in the terminal ileum, 150 mm polyp in the hepatic flexure, small hemorrhoids otherwise normal colonic mucosa.  Repeat colonoscopy in 3 years due to personal history of colon polyps.  Biopsy revealed 1 sessile serrated adenoma.  Fragments of colonic mucosa without significant histopathologic changes.  Negative for microscopic colitis.    EGD 4/24-ectopic gastric mucosa in upper third of esophagus. Mild abnormal mucosa in the duodenal bulb second portion of the duodenum with flattening of villi and mild scalloping of the mucosa.'s were negative for celiac and H. pylori. Patient had been on a gluten-free diet for 3 months prior to the procedure but also noted no improvement of his symptoms. Trial of reintroducing gluten back into the diet will be recommended.     REVIEW OF SYSTEMS IS OTHERWISE NEGATIVE.  10 point review of systems negative other than per HPI      Historical Information   Past  Medical History:   Diagnosis Date    Anxiety 4/10/2024    Autism 2004    Dr. 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 BMI 99.3% at 11 year well. Has not had anything other than water yet today so with send for obesity lab panel, will be a fasting sample.    Verbal apraxia 2004    Dr. Panchal      Past Surgical History:   Procedure Laterality Date    FRACTURE SURGERY Left     left foot    NO PAST SURGERIES      IL ARTHRODESIS SUBTALAR Left 07/31/2023    Procedure: ARTHRODESIS / FUSION SUBTALAR JOINT;  Surgeon: eGovanny Soni DPM;  Location:  MAIN OR;  Service: Podiatry     Social History   Social History     Substance and Sexual Activity   Alcohol Use Never     Social History     Substance and Sexual Activity   Drug Use Never     Social History     Tobacco Use   Smoking Status Never   Smokeless Tobacco Never     Family History   Problem Relation Age of Onset    Learning disabilities Mother         Reading as a child    Obesity Mother     Diabetes Mother     Irritable bowel syndrome Mother     Bipolar disorder Father     Depression Father     Diverticulitis Father     Anxiety disorder Maternal Grandmother     Obesity Maternal Grandmother     Depression Maternal Grandfather     Obesity Maternal Grandfather     Obesity Paternal Grandmother     Cancer Paternal Grandmother     Cancer Paternal Grandfather     Anxiety disorder Maternal Aunt     Bipolar disorder Maternal Aunt     Depression Maternal Aunt     Developmental delay Maternal Aunt         Did not talk until 2 years of age    Obesity Maternal Aunt     Obesity Paternal Aunt     Depression Cousin     Schizoaffective Disorder  Other     Breast cancer additional onset Other        Meds/Allergies       Current Outpatient Medications:     buPROPion (WELLBUTRIN  XL) 150 mg 24 hr tablet    [place], Zingiber officinalis, ([place] Root) 550 MG CAPS    multivitamin (THERAGRAN) TABS    pancrelipase, Lip-Prot-Amyl, (CREON) 6,000 units delayed release capsule    Vitamin D 12.5 MCG/0.25ML LIQD    No Known Allergies        Objective     There were no vitals taken for this visit. There is no height or weight on file to calculate BMI.      PHYSICAL EXAM:      General Appearance:   Alert, cooperative, no distress   HEENT:   Normocephalic, atraumatic, anicteric.     Neck:  Supple, symmetrical, trachea midline   Lungs:   Clear to auscultation bilaterally; no rales, rhonchi or wheezing; respirations unlabored    Heart::   Regular rate and rhythm; no murmur, rub, or gallop.   Abdomen:   Soft, non-tender, non-distended; normal bowel sounds; no masses, no organomegaly    Genitalia:   Deferred    Rectal:   Deferred    Extremities:  No cyanosis, clubbing or edema    Pulses:  2+ and symmetric    Skin:  No jaundice, rashes, or lesions    Lymph nodes:  No palpable cervical lymphadenopathy        Lab Results:   No visits with results within 1 Day(s) from this visit.   Latest known visit with results is:   Appointment on 09/30/2024   Component Date Value    Cortisol, Random 09/30/2024 20.0          Radiology Results:   Colonoscopy    Result Date: 9/24/2024  Narrative: Table formatting from the original result was not included. [facility] [address] [facility] [address] [phone] DATE OF SERVICE: 9/24/24 PHYSICIAN(S): Attending: Diana M Jaiyeola, MD Fellow: No Staff Documented INDICATION: Bloating, Change in bowel habits POST-OP DIAGNOSIS: See the impression below. HISTORY: Prior colonoscopy: No prior colonoscopy. BOWEL PREPARATION: Miralax/Dulcolax; Golytely/Colyte/Trilyte PREPROCEDURE: Informed consent was obtained for the procedure, including sedation. Risks including but not limited to bleeding, infection, perforation, adverse drug reaction and aspiration  were explained in detail. Also explained about less than 100% sensitivity with the exam and other alternatives. The patient was placed in the left lateral decubitus position. Procedure: Colonoscopy DETAILS OF PROCEDURE: Patient was taken to the procedure room where a time out was performed to confirm correct patient and correct procedure. The patient underwent monitored anesthesia care, which was administered by an anesthesia professional. The patient's blood pressure, ECG, ETCO2, heart rate, level of consciousness, oxygen and respirations were monitored throughout the procedure. A digital rectal exam was performed. The scope was introduced through the anus and advanced to the terminal ileum. Retroflexion was performed in the rectum. The quality of bowel preparation was evaluated using the Pierce Bowel Preparation Scale with scores of: right colon = 3, transverse colon = 2, left colon = 2. The total BBPS score was 7. Bowel prep was adequate. The patient experienced no blood loss. The procedure was not difficult. The patient tolerated the procedure well. There were no apparent adverse events. ANESTHESIA INFORMATION: ASA: II Anesthesia Type: IV Sedation with Anesthesia MEDICATIONS: simethicone (MYLICON) oral suspension 40 mg (Totals for administrations occurring from 1413 to 1432 on 09/24/24) FINDINGS: Mild, generalized nodular mucosa in the terminal ileum; performed cold forceps biopsy One 15 mm sessile and adenomatous-appearing polyp in the hepatic flexure; performed cold snare with complete piecemeal removal and retrieved specimen Internal small hemorrhoids Otherwise normal colonic mucosa Performed pancolonic forceps biopsies to rule out colitis EVENTS: Procedure Events Event Event Time ENDO CECUM REACHED 9/24/2024  2:20 PM ENDO SCOPE OUT TIME 9/24/2024  2:32 PM SPECIMENS: ID Type Source Tests Collected by Time Destination 1 : terminal ileum bx  nodular mucosa Tissue Terminal Ileum TISSUE EXAM Diana M Jaiyeola, MD  9/24/2024  2:22 PM  2 : colon bx's r/o microscopic colitis Tissue Colon TISSUE EXAM Diana M Jaiyeola, MD 9/24/2024  2:24 PM  3 : hepatic flexure colon polyp/cold snare Tissue Polyp, Colorectal TISSUE EXAM Diana M Jaiyeola, MD 9/24/2024  2:26 PM  EQUIPMENT: Colonoscope - scope 9056     Impression: Mild nodular mucosa in the terminal ileum; performed cold forceps biopsy One 15 mm polyp in the hepatic flexure; performed cold snare with piecemeal removal Small hemorrhoids Otherwise normal colonic mucosa Performed pancolonic forceps biopsies to rule out colitis RECOMMENDATION: Await pathology results Repeat colonoscopy in 3 years, due: 9/24/2027 Personal history of colon polyps    Diana M Jaiyeola, MD

## 2024-10-16 ENCOUNTER — OFFICE VISIT (OUTPATIENT)
Dept: FAMILY MEDICINE CLINIC | Facility: CLINIC | Age: 19
End: 2024-10-16
Payer: COMMERCIAL

## 2024-10-16 VITALS
DIASTOLIC BLOOD PRESSURE: 52 MMHG | OXYGEN SATURATION: 98 % | HEIGHT: 66 IN | SYSTOLIC BLOOD PRESSURE: 100 MMHG | BODY MASS INDEX: 24.4 KG/M2 | WEIGHT: 151.8 LBS | HEART RATE: 105 BPM

## 2024-10-16 DIAGNOSIS — F41.1 GAD (GENERALIZED ANXIETY DISORDER): Primary | ICD-10-CM

## 2024-10-16 PROCEDURE — 99214 OFFICE O/P EST MOD 30 MIN: CPT | Performed by: NURSE PRACTITIONER

## 2024-10-16 RX ORDER — ALPRAZOLAM 0.25 MG/1
0.25 TABLET ORAL 2 TIMES DAILY PRN
Qty: 60 TABLET | Refills: 0 | Status: SHIPPED | OUTPATIENT
Start: 2024-10-16

## 2024-10-16 RX ORDER — DULOXETIN HYDROCHLORIDE 30 MG/1
30 CAPSULE, DELAYED RELEASE ORAL DAILY
Qty: 30 CAPSULE | Refills: 5 | Status: CANCELLED | OUTPATIENT
Start: 2024-10-16

## 2024-10-16 RX ORDER — HYDROXYZINE HYDROCHLORIDE 25 MG/1
25 TABLET, FILM COATED ORAL EVERY 6 HOURS PRN
Qty: 30 TABLET | Refills: 0 | Status: CANCELLED | OUTPATIENT
Start: 2024-10-16

## 2024-10-16 NOTE — ASSESSMENT & PLAN NOTE
I did advise the patient that since he has only been taking the Wellbutrin for 2 weeks I am unsure if he is truly seeing any effect from the medication at this point so I will continue him on the current dosage of Wellbutrin for another 2 weeks and reassess his symptoms at that time.  In the meantime, the patient will be started on Xanax 0.25 mg to be used twice daily as needed for panic attacks.  Patient also reports that he will begin counseling in the near future.  Orders:    ALPRAZolam (XANAX) 0.25 mg tablet; Take 1 tablet (0.25 mg total) by mouth 2 (two) times a day as needed for anxiety

## 2024-10-16 NOTE — PROGRESS NOTES
Ambulatory Visit  Name: Mukund Sam      : 2005      MRN: [id number]  Encounter Provider: ARA Benjamin  Encounter Date: 10/16/2024   Encounter department: [facility]    Assessment & Plan  VIV (generalized anxiety disorder)  I did advise the patient that since he has only been taking the Wellbutrin for 2 weeks I am unsure if he is truly seeing any effect from the medication at this point so I will continue him on the current dosage of Wellbutrin for another 2 weeks and reassess his symptoms at that time.  In the meantime, the patient will be started on Xanax 0.25 mg to be used twice daily as needed for panic attacks.  Patient also reports that he will begin counseling in the near future.  Orders:    ALPRAZolam (XANAX) 0.25 mg tablet; Take 1 tablet (0.25 mg total) by mouth 2 (two) times a day as needed for anxiety       History of Present Illness     VIV: At patient's last office visit he was started on Wellbutrin 150 mg daily for treatment of his uncontrolled anxiety symptoms.  The patient has been taking the medication for over 2 weeks at this point and states that he has not seen improvement in his anxiety yet.  He also reports that earlier today he had a severe panic attack with associated palpitations and paresthesias of his upper and lower extremities.  The patient is interested in trialing an as needed medication for his anxiety.  Of note, the patient was trialed on hydroxyzine in the past and this was discontinued due to side effects.          Review of Systems   Constitutional:  Negative for chills and fever.   HENT:  Negative for ear pain and sore throat.    Eyes:  Negative for pain and visual disturbance.   Respiratory:  Negative for cough, chest tightness, shortness of breath and wheezing.    Cardiovascular:  Positive for palpitations (due to anxiety). Negative for chest pain and leg swelling.   Gastrointestinal:  Negative for abdominal pain,  "constipation, diarrhea, nausea and vomiting.   Endocrine: Negative for cold intolerance and heat intolerance.   Genitourinary:  Negative for decreased urine volume, dysuria and hematuria.   Musculoskeletal:  Negative for arthralgias, back pain and myalgias.   Skin:  Negative for color change and rash.   Allergic/Immunologic: Negative for environmental allergies.   Neurological:  Negative for dizziness, seizures, syncope, weakness, light-headedness, numbness and headaches.   Hematological:  Negative for adenopathy.   Psychiatric/Behavioral:  Negative for confusion, dysphoric mood, self-injury, sleep disturbance and suicidal ideas. The patient is nervous/anxious.    All other systems reviewed and are negative.          Objective     /52 (BP Location: Right arm, Patient Position: Sitting, Cuff Size: Standard)   Pulse 105   Ht 5' 6\" (1.676 m)   Wt 68.9 kg (151 lb 12.8 oz)   SpO2 98%   BMI 24.50 kg/m²     Physical Exam  Vitals and nursing note reviewed.   Constitutional:       General: He is not in acute distress.     Appearance: Normal appearance. He is well-developed. He is not ill-appearing.   HENT:      Head: Normocephalic.   Eyes:      Conjunctiva/sclera: Conjunctivae normal.   Cardiovascular:      Rate and Rhythm: Normal rate and regular rhythm.      Pulses: Normal pulses.           Carotid pulses are 2+ on the right side and 2+ on the left side.       Radial pulses are 2+ on the right side and 2+ on the left side.        Posterior tibial pulses are 2+ on the right side and 2+ on the left side.      Heart sounds: Normal heart sounds. No murmur heard.  Pulmonary:      Effort: Pulmonary effort is normal. No respiratory distress.      Breath sounds: Normal breath sounds. No decreased breath sounds, wheezing, rhonchi or rales.   Abdominal:      General: Abdomen is flat. Bowel sounds are normal. There is no distension.      Palpations: Abdomen is soft.      Tenderness: There is no abdominal tenderness. There " is no guarding.   Musculoskeletal:         General: No swelling. Normal range of motion.      Cervical back: Normal range of motion and neck supple.      Right lower leg: No edema.      Left lower leg: No edema.   Skin:     General: Skin is warm and dry.      Capillary Refill: Capillary refill takes less than 2 seconds.   Neurological:      General: No focal deficit present.      Mental Status: He is alert and oriented to person, place, and time.   Psychiatric:         Mood and Affect: Mood normal.         Behavior: Behavior normal.         Thought Content: Thought content normal.         Judgment: Judgment normal.

## 2024-10-18 ENCOUNTER — TELEPHONE (OUTPATIENT)
Age: 19
End: 2024-10-18

## 2024-10-18 NOTE — TELEPHONE ENCOUNTER
PA for ALPRAZolam (XANAX) 0.25 mg tablet NOT REQUIRED     Reason (screenshot if applicable)    Called and spoke to pharmacy staff. Medication filled yesterday.       Patient advised by          Pharmacy. Medication filled yesterday.       Pharmacy advised by    []Fax  [x]Phone call

## 2024-10-28 ENCOUNTER — HOSPITAL ENCOUNTER (OUTPATIENT)
Dept: RADIOLOGY | Facility: HOSPITAL | Age: 19
Discharge: HOME | End: 2024-10-28
Payer: COMMERCIAL

## 2024-10-28 DIAGNOSIS — R14.0 BLOATING: ICD-10-CM

## 2024-10-28 DIAGNOSIS — R10.84 GENERALIZED ABDOMINAL PAIN: ICD-10-CM

## 2024-10-28 PROCEDURE — 78264 GASTRIC EMPTYING IMG STUDY: CPT

## 2024-10-28 PROCEDURE — A9541 TC99M SULFUR COLLOID: HCPCS

## 2024-10-30 ENCOUNTER — OFFICE VISIT (OUTPATIENT)
Dept: GASTROENTEROLOGY | Facility: MEDICAL CENTER | Age: 19
End: 2024-10-30
Payer: COMMERCIAL

## 2024-10-30 VITALS
SYSTOLIC BLOOD PRESSURE: 128 MMHG | BODY MASS INDEX: 23.89 KG/M2 | DIASTOLIC BLOOD PRESSURE: 78 MMHG | OXYGEN SATURATION: 98 % | WEIGHT: 152.2 LBS | TEMPERATURE: 98.7 F | HEART RATE: 103 BPM | HEIGHT: 67 IN

## 2024-10-30 DIAGNOSIS — R14.3 FLATUS: ICD-10-CM

## 2024-10-30 DIAGNOSIS — K59.00 CONSTIPATION, UNSPECIFIED CONSTIPATION TYPE: Primary | ICD-10-CM

## 2024-10-30 DIAGNOSIS — R19.5 LOW FECAL ELASTASE LEVEL: ICD-10-CM

## 2024-10-30 DIAGNOSIS — R14.0 BLOATING: ICD-10-CM

## 2024-10-30 PROCEDURE — 99214 OFFICE O/P EST MOD 30 MIN: CPT | Performed by: INTERNAL MEDICINE

## 2024-10-30 RX ORDER — LINACLOTIDE 290 UG/1
290 CAPSULE, GELATIN COATED ORAL
Qty: 30 CAPSULE | Refills: 3 | Status: SHIPPED | OUTPATIENT
Start: 2024-10-30

## 2024-10-30 NOTE — PROGRESS NOTES
Ambulatory Visit  Name: Mukund Sam      : 2005      MRN: [id number]  Encounter Provider: Diana M Jaiyeola, MD  Encounter Date: 10/30/2024   Encounter department: [facility] [place]    Assessment & Plan  Constipation, unspecified constipation type  He has a history of chronic constipation, bloating symptoms.  He has undergone extensive workup showing a slightly abnormal celiac panel but negative duodenal biopsies.  Gastric emptying study was very mildly delayed at the 3, 4-hour kamari however the patient did not ingest the full meal and use Ensure for the study.  He was also found to have a low fecal elastase and was started on a Creon supplement by his primary care doctor.  He has no known history or risk factors for exocrine pancreatic insufficiency and his pancreas on his May 2024 CT appeared normal.  He was also treated for potential SIBO given results of a prior SIBO study with no improvement of his symptoms  I reviewed the images from his x-ray and acute abdominal series we discussed that given his his bloating symptoms otherwise negative workup at likely has constipation predominant IBS.  I recommend increasing Linzess dosage to 290 mcg daily with goal to have 1-2 formed bowel movements per day.  He will contact our office in 2 weeks with an update of his symptoms.  He continues to eat a gluten-free diet and is interested in reintroducing gluten back into his diet.  Once his bowel movements are improved he may introduce gluten back into his regimen if he notices symptoms may have a nonceliac gluten sensitivity and will avoid gluten at that time.  He remains on a low-dose Creon supplement and repeat pancreatic elastase testing was recently normal.  If his symptoms improved consider discontinuing Creon given he has no risk factors for pancreatic insufficiency and his first result may have been falsely abnormal  Orders:    linaCLOtide (Linzess) 290 MCG CAPS; Take 1  capsule by mouth daily before breakfast    Bloating         Flatus         Low fecal elastase level           History of Present Illness       Mukund Sam is a 19 y.o. male who presents for follow-up evaluation.     He was initially seen in the GI office in March 2024 for constipation bloating and lower abdominal discomfort.  At that time he underwent acute abdominal series showing a moderate stool burden and was recommended to start MiraLAX bowel preparation and then resume daily MiraLAX thereafter.  He had previously undergone a celiac panel showing an elevated gliadin IgG and TTG IgG.  He underwent EGD in April 2024 showing flattening of the villi and mild scalloping of the duodenal mucosa.  Duodenal and gastric biopsies were overall normal.  At his last office visit in July he continues to have abdominal bloating and was recommended undergo colonoscopy and SIBO breath testing.     Interval history: His SIBO breath test showed high correlation values which may indicate inaccurate testing versus possible intestinal with antigen overgrowth with 2 values of CH 4 greater than 10 ppm.  He was treated with a course of neomycin and Xifaxan but had no improvement of his symptoms.  He underwent colonoscopy September 2024 showing a 15 mm hepatic flexure polyp which was removed.  Pathology showed sessile serrated adenoma and he was recommended repeat in 3 years.  Random colon biopsies were normal    He continues to report abdominal bloating throughout the abdomen.  He is taking a pancreatic supplement with Creon prescribed by his primary care doctor for low fecal elastase.  He is taking 6000 units once daily with meals.    He usually has a bowel movement every other day.  He denies rectal bleeding.  He reports better appetite and regaining weight     10/2024 gastric emptying study showed mild delayed gastric emptying  5/2024 CT showed mild to moderate amount of stool in the colon otherwise normal    Review of  "Systems   Constitutional:  Negative for chills and fever.   HENT:  Negative for ear pain and sore throat.    Eyes:  Negative for pain and visual disturbance.   Respiratory:  Negative for cough and shortness of breath.    Cardiovascular:  Negative for chest pain and palpitations.   Gastrointestinal:  Negative for abdominal pain and vomiting.   Genitourinary:  Negative for dysuria and hematuria.   Musculoskeletal:  Negative for arthralgias and back pain.   Skin:  Negative for color change and rash.   Neurological:  Negative for seizures and syncope.   All other systems reviewed and are negative.    Medical History Reviewed by provider this encounter:           Objective     /78 (BP Location: Left arm, Patient Position: Sitting, Cuff Size: Standard)   Pulse 103   Temp 98.7 °F (37.1 °C) (Tympanic)   Ht 5' 7\" (1.702 m)   Wt 69 kg (152 lb 3.2 oz)   SpO2 98%   BMI 23.84 kg/m²     Physical Exam  Vitals and nursing note reviewed.   Constitutional:       General: He is not in acute distress.     Appearance: He is well-developed.   HENT:      Head: Normocephalic and atraumatic.   Eyes:      Conjunctiva/sclera: Conjunctivae normal.   Cardiovascular:      Rate and Rhythm: Normal rate and regular rhythm.      Heart sounds: No murmur heard.  Pulmonary:      Effort: Pulmonary effort is normal. No respiratory distress.      Breath sounds: Normal breath sounds.   Abdominal:      Palpations: Abdomen is soft.      Tenderness: There is no abdominal tenderness.   Musculoskeletal:         General: No swelling.      Cervical back: Neck supple.   Skin:     General: Skin is warm and dry.      Capillary Refill: Capillary refill takes less than 2 seconds.   Neurological:      Mental Status: He is alert.   Psychiatric:         Mood and Affect: Mood normal.         "

## 2024-11-26 DIAGNOSIS — F41.1 GAD (GENERALIZED ANXIETY DISORDER): ICD-10-CM

## 2024-11-27 RX ORDER — BUPROPION HYDROCHLORIDE 150 MG/1
150 TABLET ORAL EVERY MORNING
Qty: 90 TABLET | Refills: 1 | Status: SHIPPED | OUTPATIENT
Start: 2024-11-27

## 2024-12-23 DIAGNOSIS — R53.83 OTHER FATIGUE: ICD-10-CM

## 2024-12-23 DIAGNOSIS — Z13.1 SCREENING FOR DIABETES MELLITUS: ICD-10-CM

## 2024-12-23 DIAGNOSIS — E61.1 IRON DEFICIENCY: Primary | ICD-10-CM

## 2024-12-23 DIAGNOSIS — Z13.220 SCREENING FOR LIPID DISORDERS: ICD-10-CM

## 2024-12-28 ENCOUNTER — APPOINTMENT (OUTPATIENT)
Dept: LAB | Facility: MEDICAL CENTER | Age: 19
End: 2024-12-28
Payer: COMMERCIAL

## 2024-12-28 DIAGNOSIS — R53.83 OTHER FATIGUE: ICD-10-CM

## 2024-12-28 DIAGNOSIS — E61.1 IRON DEFICIENCY: ICD-10-CM

## 2024-12-28 DIAGNOSIS — Z13.220 SCREENING FOR LIPID DISORDERS: ICD-10-CM

## 2024-12-28 DIAGNOSIS — Z13.1 SCREENING FOR DIABETES MELLITUS: ICD-10-CM

## 2024-12-28 LAB
ALBUMIN SERPL BCG-MCNC: 4.4 G/DL (ref 3.5–5)
ALP SERPL-CCNC: 57 U/L (ref 34–104)
ALT SERPL W P-5'-P-CCNC: 27 U/L (ref 7–52)
ANION GAP SERPL CALCULATED.3IONS-SCNC: 10 MMOL/L (ref 4–13)
AST SERPL W P-5'-P-CCNC: 23 U/L (ref 13–39)
BASOPHILS # BLD AUTO: 0.06 THOUSANDS/ÂΜL (ref 0–0.1)
BASOPHILS NFR BLD AUTO: 1 % (ref 0–1)
BILIRUB SERPL-MCNC: 0.44 MG/DL (ref 0.2–1)
BUN SERPL-MCNC: 19 MG/DL (ref 5–25)
CALCIUM SERPL-MCNC: 9.4 MG/DL (ref 8.4–10.2)
CHLORIDE SERPL-SCNC: 102 MMOL/L (ref 96–108)
CHOLEST SERPL-MCNC: 131 MG/DL (ref ?–200)
CO2 SERPL-SCNC: 27 MMOL/L (ref 21–32)
CREAT SERPL-MCNC: 0.78 MG/DL (ref 0.6–1.3)
EOSINOPHIL # BLD AUTO: 0.12 THOUSAND/ÂΜL (ref 0–0.61)
EOSINOPHIL NFR BLD AUTO: 2 % (ref 0–6)
ERYTHROCYTE [DISTWIDTH] IN BLOOD BY AUTOMATED COUNT: 13.3 % (ref 11.6–15.1)
FERRITIN SERPL-MCNC: 64 NG/ML (ref 24–336)
GFR SERPL CREATININE-BSD FRML MDRD: 129 ML/MIN/1.73SQ M
GLUCOSE P FAST SERPL-MCNC: 91 MG/DL (ref 65–99)
HCT VFR BLD AUTO: 49.9 % (ref 36.5–49.3)
HDLC SERPL-MCNC: 56 MG/DL
HGB BLD-MCNC: 16.1 G/DL (ref 12–17)
IMM GRANULOCYTES # BLD AUTO: 0.01 THOUSAND/UL (ref 0–0.2)
IMM GRANULOCYTES NFR BLD AUTO: 0 % (ref 0–2)
IRON SATN MFR SERPL: 28 % (ref 15–50)
IRON SERPL-MCNC: 103 UG/DL (ref 50–212)
LDLC SERPL CALC-MCNC: 67 MG/DL (ref 0–100)
LYMPHOCYTES # BLD AUTO: 2.27 THOUSANDS/ÂΜL (ref 0.6–4.47)
LYMPHOCYTES NFR BLD AUTO: 43 % (ref 14–44)
MCH RBC QN AUTO: 28.3 PG (ref 26.8–34.3)
MCHC RBC AUTO-ENTMCNC: 32.3 G/DL (ref 31.4–37.4)
MCV RBC AUTO: 88 FL (ref 82–98)
MONOCYTES # BLD AUTO: 0.36 THOUSAND/ÂΜL (ref 0.17–1.22)
MONOCYTES NFR BLD AUTO: 7 % (ref 4–12)
NEUTROPHILS # BLD AUTO: 2.5 THOUSANDS/ÂΜL (ref 1.85–7.62)
NEUTS SEG NFR BLD AUTO: 47 % (ref 43–75)
NONHDLC SERPL-MCNC: 75 MG/DL
NRBC BLD AUTO-RTO: 0 /100 WBCS
PLATELET # BLD AUTO: 250 THOUSANDS/UL (ref 149–390)
PMV BLD AUTO: 10.3 FL (ref 8.9–12.7)
POTASSIUM SERPL-SCNC: 4.2 MMOL/L (ref 3.5–5.3)
PROT SERPL-MCNC: 7.2 G/DL (ref 6.4–8.4)
RBC # BLD AUTO: 5.68 MILLION/UL (ref 3.88–5.62)
SODIUM SERPL-SCNC: 139 MMOL/L (ref 135–147)
TIBC SERPL-MCNC: 368.2 UG/DL (ref 250–450)
TRANSFERRIN SERPL-MCNC: 263 MG/DL (ref 203–362)
TRIGL SERPL-MCNC: 42 MG/DL (ref ?–150)
UIBC SERPL-MCNC: 265 UG/DL (ref 155–355)
WBC # BLD AUTO: 5.32 THOUSAND/UL (ref 4.31–10.16)

## 2024-12-28 PROCEDURE — 82672 ASSAY OF ESTROGEN: CPT

## 2024-12-28 PROCEDURE — 83540 ASSAY OF IRON: CPT

## 2024-12-28 PROCEDURE — 80053 COMPREHEN METABOLIC PANEL: CPT

## 2024-12-28 PROCEDURE — 83550 IRON BINDING TEST: CPT

## 2024-12-28 PROCEDURE — 80061 LIPID PANEL: CPT

## 2024-12-28 PROCEDURE — 36415 COLL VENOUS BLD VENIPUNCTURE: CPT

## 2024-12-28 PROCEDURE — 82728 ASSAY OF FERRITIN: CPT

## 2024-12-28 PROCEDURE — 85025 COMPLETE CBC W/AUTO DIFF WBC: CPT

## 2024-12-30 ENCOUNTER — APPOINTMENT (OUTPATIENT)
Dept: LAB | Facility: CLINIC | Age: 19
End: 2024-12-30
Payer: COMMERCIAL

## 2024-12-30 DIAGNOSIS — R53.83 OTHER FATIGUE: ICD-10-CM

## 2024-12-30 PROCEDURE — 84403 ASSAY OF TOTAL TESTOSTERONE: CPT

## 2024-12-30 PROCEDURE — 84402 ASSAY OF FREE TESTOSTERONE: CPT

## 2024-12-30 PROCEDURE — 36415 COLL VENOUS BLD VENIPUNCTURE: CPT

## 2024-12-31 ENCOUNTER — RESULTS FOLLOW-UP (OUTPATIENT)
Dept: FAMILY MEDICINE CLINIC | Facility: CLINIC | Age: 19
End: 2024-12-31

## 2024-12-31 LAB
ESTROGEN SERPL-MCNC: 165 PG/ML (ref 56–213)
TESTOST FREE SERPL-MCNC: 23 PG/ML (ref 9.3–26.5)
TESTOST SERPL-MCNC: 403 NG/DL (ref 264–916)

## 2025-01-03 DIAGNOSIS — Z71.3 NUTRITIONAL COUNSELING: Primary | ICD-10-CM

## 2025-01-06 ENCOUNTER — TELEPHONE (OUTPATIENT)
Facility: HOSPITAL | Age: 20
End: 2025-01-06

## 2025-01-06 NOTE — TELEPHONE ENCOUNTER
Roberto returned my call, regarding sports nutrition. We discussed the options for to be seen at a [facility] and go through insurance versus paying out of pocket at a fitness center. Roberto stated that he is only really looking for a body composition analysis. He feels that he has his nutrition under control. At this time he does not wish to schedule a sports nutrition consult, but will call back if he changes his mind.

## 2025-01-06 NOTE — TELEPHONE ENCOUNTER
Mukund called and requested his call be turned, regarding a sports nutrition assessment. I called him and left a message asking him to call me back to discuss this, at his earliest convenience.

## 2025-01-08 ENCOUNTER — OFFICE VISIT (OUTPATIENT)
Dept: FAMILY MEDICINE CLINIC | Facility: CLINIC | Age: 20
End: 2025-01-08
Payer: COMMERCIAL

## 2025-01-08 VITALS
OXYGEN SATURATION: 98 % | DIASTOLIC BLOOD PRESSURE: 70 MMHG | BODY MASS INDEX: 25.39 KG/M2 | SYSTOLIC BLOOD PRESSURE: 112 MMHG | HEART RATE: 120 BPM | HEIGHT: 66 IN | WEIGHT: 158 LBS

## 2025-01-08 DIAGNOSIS — F41.1 GAD (GENERALIZED ANXIETY DISORDER): ICD-10-CM

## 2025-01-08 DIAGNOSIS — K58.2 IRRITABLE BOWEL SYNDROME WITH BOTH CONSTIPATION AND DIARRHEA: Primary | ICD-10-CM

## 2025-01-08 PROBLEM — R10.84 GENERALIZED ABDOMINAL PAIN: Status: RESOLVED | Noted: 2024-04-15 | Resolved: 2025-01-08

## 2025-01-08 PROCEDURE — 99214 OFFICE O/P EST MOD 30 MIN: CPT | Performed by: NURSE PRACTITIONER

## 2025-01-08 NOTE — PROGRESS NOTES
"Adult Annual Physical  Name: Mukund Sam      : 2005      MRN: [id number]  Encounter Provider: ARA Benjamin  Encounter Date: 2025   Encounter department: [facility] PRIMARY CARE    Assessment & Plan  Immunizations and preventive care screenings were discussed with patient today. Appropriate education was printed on patient's after visit summary.    Counseling:  Alcohol/drug use: discussed moderation in alcohol intake, the recommendations for healthy alcohol use, and avoidance of illicit drug use.  Dental Health: discussed importance of regular tooth brushing, flossing, and dental visits.  Injury prevention: discussed safety/seat belts, safety helmets, smoke detectors, carbon monoxide detectors, and smoking near bedding or upholstery.  Sexual health: discussed sexually transmitted diseases, partner selection, use of condoms, avoidance of unintended pregnancy, and contraceptive alternatives.  Exercise: the importance of regular exercise/physical activity was discussed. Recommend exercise 3-5 times per week for at least 30 minutes.          History of Present Illness   {?Quick Links Encounters * My Last Note * Last Note in Specialty * Snapshot * Since Last Visit * History :65911}  Adult Annual Physical  Review of Systems  {Select to Display PMH (Optional):87405}    Objective {?Quick Links Trend Vitals * Enter New Vitals * Results Review * Timeline (Adult) * Labs * Imaging * Cardiology * Procedures * Lung Cancer Screening * Surgical eConsent :28206}  /70 (BP Location: Right arm, Patient Position: Sitting, Cuff Size: Standard)   Pulse (!) 120   Ht 5' 5.5\" (1.664 m)   Wt 71.7 kg (158 lb)   SpO2 98%   BMI 25.89 kg/m²     Physical Exam  {Administrative / Billing Section (Optional):57874}  "

## 2025-01-08 NOTE — PROGRESS NOTES
Name: Mukund Sam      : 2005      MRN: [id number]  Encounter Provider: ARA Benjamin  Encounter Date: 2025   Encounter department: [facility] PRIMARY CARE  :  Assessment & Plan  Irritable bowel syndrome with both constipation and diarrhea  Patient's symptoms do seem consistent with IBS with constipation.  Patient was advised to continue the current dosage of Linzess and he does have upcoming follow-up scheduled with GI regarding this.       VIV (generalized anxiety disorder)  Well-controlled on current regimen.             Depression Screening and Follow-up Plan: Patient's depression screening was positive with a PHQ-2 score of 3. Their PHQ-9 score was 5.   Patient assessed for underlying major depression. Brief counseling provided and recommend additional follow-up/re-evaluation next office visit.     History of Present Illness     VIV: Well-controlled on current dosage of Wellbutrin and as needed use of Xanax.  Patient reports rarely needing to use Xanax.    IBS: Patient has been experiencing a multitude of abdominal symptoms over the past few months including abdominal pain, bloating, diarrhea, and constipation.  Patient did previously have a low fecal pancreatic elastase level so it was felt that he may be suffering from exocrine pancreatic insufficiency and he was started on Creon for treatment.  Patient did not note much improvement in his symptoms since beginning Creon.  Patient has been following with GI for his symptoms and recently had a repeat pancreatic elastase level completed which was normal so it was felt that the first level may have been a false positive.  Per GI's most recent note they feel that the patient is likely suffering from IBS with constipation and he was recently started on Linzess for treatment.  He does report that since beginning Linzess he has noted less bloating and constipation.  He is scheduled for follow-up with GI on  "1/17/2025.      Review of Systems   Constitutional:  Negative for chills and fever.   HENT:  Negative for ear pain and sore throat.    Eyes:  Negative for pain and visual disturbance.   Respiratory:  Negative for cough, chest tightness, shortness of breath and wheezing.    Cardiovascular:  Negative for chest pain, palpitations and leg swelling.   Gastrointestinal:  Positive for constipation and diarrhea. Negative for abdominal pain, nausea and vomiting.        Bloating   Endocrine: Negative for cold intolerance and heat intolerance.   Genitourinary:  Negative for decreased urine volume, dysuria and hematuria.   Musculoskeletal:  Negative for arthralgias, back pain and myalgias.   Skin:  Negative for color change and rash.   Allergic/Immunologic: Negative for environmental allergies.   Neurological:  Negative for dizziness, seizures, syncope, weakness, light-headedness, numbness and headaches.   Hematological:  Negative for adenopathy.   Psychiatric/Behavioral:  Negative for confusion. The patient is not nervous/anxious.    All other systems reviewed and are negative.      Objective   /70 (BP Location: Right arm, Patient Position: Sitting, Cuff Size: Standard)   Pulse (!) 120   Ht 5' 5.5\" (1.664 m)   Wt 71.7 kg (158 lb)   SpO2 98%   BMI 25.89 kg/m²      Physical Exam  Vitals and nursing note reviewed.   Constitutional:       General: He is not in acute distress.     Appearance: Normal appearance. He is well-developed. He is not ill-appearing.   HENT:      Head: Normocephalic.   Eyes:      Conjunctiva/sclera: Conjunctivae normal.   Cardiovascular:      Rate and Rhythm: Normal rate and regular rhythm.      Pulses: Normal pulses.           Carotid pulses are 2+ on the right side and 2+ on the left side.       Radial pulses are 2+ on the right side and 2+ on the left side.        Posterior tibial pulses are 2+ on the right side and 2+ on the left side.      Heart sounds: Normal heart sounds. No murmur " heard.  Pulmonary:      Effort: Pulmonary effort is normal. No respiratory distress.      Breath sounds: Normal breath sounds. No decreased breath sounds, wheezing, rhonchi or rales.   Abdominal:      General: Abdomen is flat. Bowel sounds are normal. There is no distension.      Palpations: Abdomen is soft.      Tenderness: There is no abdominal tenderness. There is no guarding.   Musculoskeletal:         General: No swelling. Normal range of motion.      Cervical back: Normal range of motion and neck supple.      Right lower leg: No edema.      Left lower leg: No edema.   Skin:     General: Skin is warm and dry.      Capillary Refill: Capillary refill takes less than 2 seconds.   Neurological:      General: No focal deficit present.      Mental Status: He is alert and oriented to person, place, and time.   Psychiatric:         Mood and Affect: Mood normal.         Behavior: Behavior normal.         Thought Content: Thought content normal.         Judgment: Judgment normal.

## 2025-01-08 NOTE — ASSESSMENT & PLAN NOTE
Patient's symptoms do seem consistent with IBS with constipation.  Patient was advised to continue the current dosage of Linzess and he does have upcoming follow-up scheduled with GI regarding this.

## 2025-01-09 DIAGNOSIS — R19.7 DIARRHEA, UNSPECIFIED TYPE: Primary | ICD-10-CM

## 2025-01-09 DIAGNOSIS — R14.0 BLOATING: ICD-10-CM

## 2025-01-10 ENCOUNTER — APPOINTMENT (OUTPATIENT)
Age: 20
End: 2025-01-10

## 2025-01-11 ENCOUNTER — APPOINTMENT (OUTPATIENT)
Dept: LAB | Facility: MEDICAL CENTER | Age: 20
End: 2025-01-11
Payer: COMMERCIAL

## 2025-01-11 DIAGNOSIS — R14.0 BLOATING: ICD-10-CM

## 2025-01-11 DIAGNOSIS — R19.7 DIARRHEA, UNSPECIFIED TYPE: ICD-10-CM

## 2025-01-11 PROCEDURE — 87177 OVA AND PARASITES SMEARS: CPT

## 2025-01-11 PROCEDURE — 87209 SMEAR COMPLEX STAIN: CPT

## 2025-01-15 ENCOUNTER — RESULTS FOLLOW-UP (OUTPATIENT)
Dept: FAMILY MEDICINE CLINIC | Facility: CLINIC | Age: 20
End: 2025-01-15

## 2025-01-17 ENCOUNTER — TELEMEDICINE (OUTPATIENT)
Dept: GASTROENTEROLOGY | Facility: MEDICAL CENTER | Age: 20
End: 2025-01-17
Payer: COMMERCIAL

## 2025-01-17 DIAGNOSIS — K59.09 CHRONIC CONSTIPATION: Primary | ICD-10-CM

## 2025-01-17 DIAGNOSIS — R14.0 BLOATING: ICD-10-CM

## 2025-01-17 PROCEDURE — 99214 OFFICE O/P EST MOD 30 MIN: CPT | Performed by: NURSE PRACTITIONER

## 2025-01-17 NOTE — PROGRESS NOTES
Virtual Regular Visit  Name: Mukund Sam      : 2005      MRN: [id number]  Encounter Provider: ARA Vega  Encounter Date: 2025   [facility]      Verification of patient location:  Patient is located at Home in the following state in which I hold an active license [place] :  Assessment & Plan  Chronic constipation  Recent stool elastase  was normal.  Patient reporting BMs are brown and formed daily to every other day.  He is currently taking Linzess 290 mcg for the past month or so.  Feels like he is completely evacuating.  Does report some generalized abdominal bloating.  He has tried a low FODMAP diet.  He has tried peppermint oil, Gas-X/Phazyme.  He is using Lactaid.  None of these things have helped his bloating.  No melena or hematochezia.  He does have a history of SIBO and was treated with Xifaxan and neomycin.  Reports side effects from those antibiotics and no relief after treatment completed.  I did discuss specifically trying IBgard.  Will stop the Creon as most likely stool elastase was false positive and patient does not have any reason for pancreatic insufficiency.  Will see how these changes affect his bloating.  He did undergo CT abdomen pelvis which was normal.  He has undergone a colonoscopy which were essentially negative other than a polyp in his colon.  Gastric emptying did show a slight delay at 3 to 4 hours.  This may be contributing to his constipation.  Overall I suspect that his abdominal bloating is related to his SIBO and his constipation.  If no change will consider switching Linzess to Trulance.    -Continue Linzess 290 mcg daily  -Trial of Ibgard  -Follow-up in office in 3 to 4 months or sooner if needed  -Contact office in about a month with an update on status         Bloating  Refer above           Encounter provider ARA Vega    The patient was identified by name  and date of birth. Mukund Sam was informed that this is a telemedicine visit and that the visit is being conducted through the Epic Embedded platform. He agrees to proceed..  My office door was closed. No one else was in the room.  He acknowledged consent and understanding of privacy and security of the video platform. The patient has agreed to participate and understands they can discontinue the visit at any time.    Patient is aware this is a billable service.     History of Present Illness     HPI    19-year-old male with history of chronic constipation.  Also notes chronic abdominal bloating.    He has undergone extensive workup showing a slightly abnormal celiac panel but negative duodenal biopsies. Gastric emptying study was very mildly delayed at the 3, 4-hour kamari however the patient did not ingest the full meal and use Ensure for the study. He was also found to have a low fecal elastase and was started on a Creon supplement by his primary care doctor. He has no known history or risk factors for exocrine pancreatic insufficiency and his pancreas on his May 2024 CT appeared normal. He was also treated for potential SIBO given results of a prior SIBO study with no improvement of his symptoms.    Interval history: Recent stool elastase 6/24 was normal.  Patient reporting BMs are brown and formed daily to every other day.  He is currently taking Linzess 290 mcg for the past month or so.  Feels like he is completely evacuating.  Does report some generalized abdominal bloating.  He has tried a low FODMAP diet.  He has tried peppermint oil, Gas-X/Phazyme.  He is using Lactaid.  None of these things have helped his bloating.  No melena or hematochezia.  He does have a history of SIBO and was treated with Xifaxan and neomycin.  Reports side effects from those antibiotics and no relief after treatment completed.    Labs 12/24-CMP normal, iron studies normal, CBC normal other than RBCs 5.68.  Platelets  250    Prior EGD/colonoscopy    Colonoscopy 9/24-mild nodular mucosa in the terminal ileum.  115 mm polyp hepatic flexure.  Small hemorrhoids otherwise normal exam.  Repeat colonoscopy in 3 years due to personal history of colon polyps.  Biopsies revealed sessile serrated adenoma.  Biopsies negative for microscopic colitis.    10/2024 gastric emptying study showed mild delayed gastric emptying  5/2024 CT showed mild to moderate amount of stool in the colon otherwise normal  Review of Systems   Gastrointestinal:  Positive for abdominal distention and constipation.   All other systems reviewed and are negative.      Objective   There were no vitals taken for this visit.    Physical Exam  REVIEW OF SYSTEMS:    CONSTITUTIONAL: Denies any fever, chills, rigors, and weight loss.  HEENT: No earache or tinnitus. Denies hearing loss or visual disturbances.  CARDIOVASCULAR: No chest pain or palpitations.   RESPIRATORY: Denies any cough, hemoptysis, shortness of breath or dyspnea on exertion.  GASTROINTESTINAL: As noted in the History of Present Illness.   GENITOURINARY: No problems with urination. Denies any hematuria or dysuria.  NEUROLOGIC: No dizziness or vertigo, denies headaches.   MUSCULOSKELETAL: Denies any muscle or joint pain.   SKIN: Denies skin rashes or itching.   ENDOCRINE: Denies excessive thirst. Denies intolerance to heat or cold.  PSYCHOSOCIAL: Denies depression or anxiety. Denies any recent memory loss.       PHYSICAL EXAMINATION:  Appearance and vitals taken from home devices    General Appearance:   Alert, cooperative, no distress   HEENT:  Normocephalic, atraumatic, anicteric. Neck supple, symmetrical, trachea midline.   Lungs:   Equal chest rise and unlabored breathing, normal effort, no coughing.   Cardiovascular:   No visualized JVD.   Abdomen:   No abdominal distension.   Skin:   No jaundice, rashes, or lesions.    Musculoskeletal:   Normal range of motion visualized.   Psych:  Normal affect and  normal insight.   Neuro:  Alert and appropriate.        Visit Time  Total Visit Duration: 14 minutes

## 2025-01-21 DIAGNOSIS — K86.89 PANCREATIC INSUFFICIENCY: Primary | ICD-10-CM

## 2025-01-27 DIAGNOSIS — R14.0 BLOATING: Primary | ICD-10-CM

## 2025-01-28 DIAGNOSIS — R53.83 OTHER FATIGUE: ICD-10-CM

## 2025-01-28 DIAGNOSIS — R14.0 BLOATING: Primary | ICD-10-CM

## 2025-01-31 DIAGNOSIS — E34.9 HORMONE IMBALANCE: Primary | ICD-10-CM

## 2025-02-03 DIAGNOSIS — K59.09 CHRONIC CONSTIPATION: Primary | ICD-10-CM

## 2025-02-03 RX ORDER — PLECANATIDE 3 MG/1
3 TABLET ORAL DAILY
Qty: 30 TABLET | Refills: 3 | Status: SHIPPED | OUTPATIENT
Start: 2025-02-03

## 2025-02-05 DIAGNOSIS — F84.0 AUTISM SPECTRUM DISORDER: Primary | ICD-10-CM

## 2025-02-18 ENCOUNTER — TELEPHONE (OUTPATIENT)
Age: 20
End: 2025-02-18

## 2025-03-03 ENCOUNTER — TELEPHONE (OUTPATIENT)
Age: 20
End: 2025-03-03

## 2025-03-03 NOTE — TELEPHONE ENCOUNTER
Patient calling in stating he does not have active insurance yet, and is asking how much visit will be on 3/4/25 out of pocket with no insurance. Attempted to connect Patient to [employer] clerical, but was unable to establish connection due to technical difficulty. Please call patient to discuss oop payment. Thank you

## 2025-03-04 ENCOUNTER — OFFICE VISIT (OUTPATIENT)
Dept: ENDOCRINOLOGY | Facility: CLINIC | Age: 20
End: 2025-03-04

## 2025-03-04 VITALS
OXYGEN SATURATION: 98 % | DIASTOLIC BLOOD PRESSURE: 66 MMHG | HEART RATE: 97 BPM | WEIGHT: 165 LBS | HEIGHT: 67 IN | TEMPERATURE: 99.9 F | RESPIRATION RATE: 18 BRPM | BODY MASS INDEX: 25.9 KG/M2 | SYSTOLIC BLOOD PRESSURE: 166 MMHG

## 2025-03-04 DIAGNOSIS — E34.9 HORMONE IMBALANCE: ICD-10-CM

## 2025-03-04 PROCEDURE — 99243 OFF/OP CNSLTJ NEW/EST LOW 30: CPT | Performed by: STUDENT IN AN ORGANIZED HEALTH CARE EDUCATION/TRAINING PROGRAM

## 2025-03-04 NOTE — PROGRESS NOTES
Name: Mukund Sam      : 2005      MRN: [id number]  Encounter Provider: Kraig Bob MD  Encounter Date: 3/4/2025   Encounter department: [facility]    Chief Complaint   Patient presents with    Advice Only   :  Assessment & Plan  Hormone imbalance    Mukund was evaluated by primary team for feeling tired, not able to gain muscle, and other nonspecific symptoms including body odor and abdominal bloating.  His testosterone was checked 2 times, which showed total testosterone of 403 and 404 ng/dl (264-916) and free testosterone of 23 and 18.4 pg/ml (9.3-26.5) and estrogen which was checked 165 ().  We did discuss his lab findings, as well as symptoms and differential diagnosis.  testosterone panel is at very reassuring range, which rules out hypogonadism, and I defer further evaluation.  Estrogen level is at goal, he has no gynecomastia and with intact secondary sexual characteristics and additionally is well virilized, therefore no further evaluation including testicular or adrenal imaging required.  Other differential including anxiety, irritable bowel syndrome should be considered.  He was discharged to his primary team for further evaluation and management.    Orders:    Ambulatory Referral to Endocrinology        History of Present Illness     Mukund Sam is a 19 y.o. male for evaluation of hormonal imbalance at the request ARA Benjamin      He reports waking up very tired.  Muscle growth slowed down, and reports  body odor  Reports abdominal bloating.    symptoms.are daily,  Some morning he feels refreshed not often,   He does snore     No breast development      Puberty:  had a normal puberty    Shaving normally No     Gonadal ROS:    Change in strength No  Loss of axillary/pubic hairNo  Decreased libidoNo  Decreased erections No  Headaches No  Vision change No     Modifying factors:  Risk factors for  "hypogonadism:  Traumatic brain injury No  Testicular trauma No  Radiation therapy No  Chemotherapy No  Obesity No  Diabetes No  HIV/AIDSNo  Steroid useNo  Narcotic use No            Review of Systems as per HPI       Medical History Reviewed by provider this encounter:     .    Objective   /66 (BP Location: Left arm, Patient Position: Sitting, Cuff Size: Adult)   Pulse 97   Temp 99.9 °F (37.7 °C) (Temporal)   Resp 18   Ht 5' 7\" (1.702 m)   Wt 74.8 kg (165 lb)   SpO2 98%   BMI 25.84 kg/m²      Body mass index is 25.84 kg/m².  Wt Readings from Last 3 Encounters:   03/04/25 74.8 kg (165 lb)   01/08/25 71.7 kg (158 lb)   10/30/24 69 kg (152 lb 3.2 oz)     Physical Exam  Vitals and nursing note reviewed.   Constitutional:       General: He is not in acute distress.     Appearance: He is well-developed.   HENT:      Head: Normocephalic and atraumatic.   Eyes:      Conjunctiva/sclera: Conjunctivae normal.   Cardiovascular:      Rate and Rhythm: Normal rate and regular rhythm.      Heart sounds: No murmur heard.  Pulmonary:      Effort: Pulmonary effort is normal. No respiratory distress.      Breath sounds: Normal breath sounds.   Musculoskeletal:         General: No swelling.      Cervical back: Neck supple.   Skin:     General: Skin is warm and dry.   Neurological:      Mental Status: He is alert.   Psychiatric:         Mood and Affect: Mood normal.         Labs:   Lab Results   Component Value Date    HGBA1C 5.4 10/08/2022    HGBA1C 5.5 10/08/2021     Lab Results   Component Value Date    CREATININE 0.78 12/28/2024    CREATININE 0.76 07/18/2024    CREATININE 0.89 04/17/2024    BUN 19 12/28/2024    K 4.2 12/28/2024     12/28/2024    CO2 27 12/28/2024     eGFR   Date Value Ref Range Status   12/28/2024 129 ml/min/1.73sq m Final     Lab Results   Component Value Date    HDL 56 12/28/2024    TRIG 42 12/28/2024     Lab Results   Component Value Date    ALT 27 12/28/2024    AST 23 12/28/2024    ALKPHOS 57 " "12/28/2024     Lab Results   Component Value Date    UUG1RZCTEQLV 1.180 10/08/2021     Component      Latest Ref Rng 4/17/2024 9/30/2024 12/28/2024 12/30/2024   TESTOSTERONE FREE      9.3 - 26.5 pg/mL 18.4    23.0    Testosterone, Total, LC/MS      264 - 916 ng/dL 404    403    Cortisol, Random      ug/dL 15.4  20.0      ESTROGEN LEVEL      56 - 213 pg/mL   165         No results found for: \"FREET4\", \"TSI\"    There are no Patient Instructions on file for this visit.    Discussed with the patient and all questioned fully answered. He will call me if any problems arise.      "

## 2025-03-05 DIAGNOSIS — K59.09 CHRONIC CONSTIPATION: ICD-10-CM

## 2025-03-05 RX ORDER — PLECANATIDE 3 MG/1
3 TABLET ORAL DAILY
Qty: 30 TABLET | Refills: 3 | Status: SHIPPED | OUTPATIENT
Start: 2025-03-05

## 2025-03-18 ENCOUNTER — TELEPHONE (OUTPATIENT)
Age: 20
End: 2025-03-18

## 2025-03-18 NOTE — TELEPHONE ENCOUNTER
Patient calling asking if we can submit his insurance information to the billing department. He is going to send it via [employer].   
How Severe Is It?: moderate
Is This A New Presentation, Or A Follow-Up?: Follow Up Isotretinoin

## 2025-03-20 ENCOUNTER — TELEPHONE (OUTPATIENT)
Age: 20
End: 2025-03-20

## 2025-03-20 ENCOUNTER — APPOINTMENT (OUTPATIENT)
Dept: RADIOLOGY | Facility: MEDICAL CENTER | Age: 20
End: 2025-03-20
Payer: COMMERCIAL

## 2025-03-20 ENCOUNTER — RESULTS FOLLOW-UP (OUTPATIENT)
Dept: OTHER | Facility: HOSPITAL | Age: 20
End: 2025-03-20

## 2025-03-20 DIAGNOSIS — K59.09 CHRONIC CONSTIPATION: Primary | ICD-10-CM

## 2025-03-20 DIAGNOSIS — K59.09 CHRONIC CONSTIPATION: ICD-10-CM

## 2025-03-20 PROCEDURE — 74022 RADEX COMPL AQT ABD SERIES: CPT

## 2025-03-20 RX ORDER — POLYETHYLENE GLYCOL 3350 17 G/17G
POWDER, FOR SOLUTION ORAL
Qty: 238 G | Refills: 0 | Status: SHIPPED | OUTPATIENT
Start: 2025-03-20

## 2025-03-20 RX ORDER — LUBIPROSTONE 8 UG/1
8 CAPSULE ORAL 2 TIMES DAILY WITH MEALS
Qty: 60 CAPSULE | Refills: 3 | Status: SHIPPED | OUTPATIENT
Start: 2025-03-20

## 2025-03-20 NOTE — RESULT ENCOUNTER NOTE
Can you please call patient and tell him that his x-ray did show a large amount of stool in the colon.  It looks like the Amitiza was approved but I would like him to do a MiraLAX prep today to help clean out the stool.  Please have us update us tomorrow with how he did with the preparation.  Thanks.

## 2025-03-20 NOTE — TELEPHONE ENCOUNTER
"Pt called. Pt concerned regarding XR. Requesting provider review image # 1 and advise on \"bubble/mass\" on his L side. Also states Miralax prep did not clear him out last time. Is there an alternative?    Prefers communication via [employer].  "

## 2025-03-21 ENCOUNTER — TELEPHONE (OUTPATIENT)
Age: 20
End: 2025-03-21

## 2025-03-21 NOTE — TELEPHONE ENCOUNTER
Pt calling regarding Miralax cleanout.  Would like to know if he can eat because the last time he did it he had a headache from not eating.   Advised that we recommend clear liquids, because eating solids foods, will make clean out less effective.  Pt  asking if there is a laxative that is not as strong.  Advised that miralax is the mildest laxative and that anytime you do a clean out, it will be strong in order to clear colon of stool.  Pt verbalizes understanding and is agreeable to completing the Miralax cleanout.

## 2025-03-21 NOTE — TELEPHONE ENCOUNTER
PA for lubiprostone 8  mcg    SUBMITTED to [employer]    via    [x]Other website Prompt PA EOC ID [id number]       []PA sent as URGENT    All office notes, labs and other pertaining documents and studies sent. Clinical questions answered. Awaiting determination from insurance company.     Turnaround time for your insurance to make a decision on your Prior Authorization can take 7-21 business days.

## 2025-03-24 NOTE — TELEPHONE ENCOUNTER
PA for LUBIPROSTONE 8 MCG  APPROVED     Date(s) approved START 03/21/2025 NO END DATE PROVIDED        Patient advised by          [x]MyChart Message  []Phone call   []LMOM  []L/M to call office as no active Communication consent on file  []Unable to leave detailed message as VM not approved on Communication consent       Pharmacy advised by    [x]Fax  []Phone call  []Secure Chat    Specialty Pharmacy    []     Approval letter scanned into Media Yes

## 2025-04-15 ENCOUNTER — OFFICE VISIT (OUTPATIENT)
Dept: GASTROENTEROLOGY | Facility: MEDICAL CENTER | Age: 20
End: 2025-04-15
Payer: COMMERCIAL

## 2025-04-15 VITALS
HEART RATE: 76 BPM | SYSTOLIC BLOOD PRESSURE: 126 MMHG | BODY MASS INDEX: 25.9 KG/M2 | DIASTOLIC BLOOD PRESSURE: 71 MMHG | TEMPERATURE: 97.6 F | HEIGHT: 67 IN | OXYGEN SATURATION: 98 % | WEIGHT: 165 LBS

## 2025-04-15 DIAGNOSIS — K59.09 CHRONIC CONSTIPATION: ICD-10-CM

## 2025-04-15 PROCEDURE — 99214 OFFICE O/P EST MOD 30 MIN: CPT | Performed by: NURSE PRACTITIONER

## 2025-04-15 RX ORDER — LUBIPROSTONE 24 UG/1
24 CAPSULE ORAL 2 TIMES DAILY WITH MEALS
Qty: 60 CAPSULE | Refills: 3 | Status: SHIPPED | OUTPATIENT
Start: 2025-04-15

## 2025-04-15 RX ORDER — LUBIPROSTONE 8 UG/1
24 CAPSULE ORAL 2 TIMES DAILY WITH MEALS
Qty: 60 CAPSULE | Refills: 3 | Status: SHIPPED | OUTPATIENT
Start: 2025-04-15 | End: 2025-04-15

## 2025-04-15 NOTE — PROGRESS NOTES
Name: Mukund Sam      : 2005      MRN: [id number]  Encounter Provider: ARA Vega  Encounter Date: 4/15/2025   Encounter department[facility]  :  Assessment & Plan  Chronic constipation  Recently started Amitiza 8 mcg twice daily.  Still has days of Wallace stool scale 1 and harder stools.  Does get some lower abdominal cramping associated with his constipation.  Send obstructive series did show a large stool burden and patient was given MiraLAX prep which was somewhat successful.  Amitiza 24 mcg twice daily.  Instructed patient to contact office in 2 weeks with update  Orders:    lubiprostone (AMITIZA) 24 mcg capsule; Take 1 capsule (24 mcg total) by mouth 2 (two) times a day with meals  Contact office in 2 weeks with update  In 4 months      History of Present Illness   Mukund Sam is a 19 y.o. male who presents follow-up.  He was last seen by myself  for chronic constipation.    HPI    Recent stool elastase  was normal. Patient reporting BMs are brown and formed daily to every other day. He is currently taking Linzess 290 mcg for the past month or so. Feels like he is completely evacuating. Does report some generalized abdominal bloating. He has tried a low FODMAP diet. He has tried peppermint oil, Gas-X/Phazyme. He is using Lactaid. None of these things have helped his bloating. No melena or hematochezia. He does have a history of SIBO and was treated with Xifaxan and neomycin. Reports side effects from those antibiotics and no relief after treatment completed. I did discuss specifically trying IBgard. Will stop the Creon as most likely stool elastase was false positive and patient does not have any reason for pancreatic insufficiency. Will see how these changes affect his bloating. He did undergo CT abdomen pelvis which was normal. He has undergone a colonoscopy which were essentially negative other than a polyp in  his colon. Gastric emptying did show a slight delay at 3 to 4 hours. This may be contributing to his constipation. Overall I suspect that his abdominal bloating is related to his SIBO and his constipation. If no change will consider switching Linzess to Trulance.     Interval history: Recently started Amitiza 8 mcg twice daily.  Still has days of Stevens stool scale 1 and harder stools.  Does get some lower abdominal cramping associated with his constipation.          Prior EGD/colonoscopy     Colonoscopy 9/24-mild nodular mucosa in the terminal ileum.  115 mm polyp hepatic flexure.  Small hemorrhoids otherwise normal exam.  Repeat colonoscopy in 3 years due to personal history of colon polyps.  Biopsies revealed sessile serrated adenoma.  Biopsies negative for microscopic colitis.     10/2024 gastric emptying study showed mild delayed gastric emptying  5/2024 CT showed mild to moderate amount of stool in the colon otherwise normal      Review of Systems   Constitutional:  Negative for chills and fever.   HENT:  Negative for ear pain and sore throat.    Eyes:  Negative for pain and visual disturbance.   Respiratory:  Negative for cough and shortness of breath.    Cardiovascular:  Negative for chest pain and palpitations.   Gastrointestinal:  Positive for abdominal pain and constipation. Negative for vomiting.   Genitourinary:  Negative for dysuria and hematuria.   Musculoskeletal:  Negative for arthralgias and back pain.   Skin:  Negative for color change and rash.   Neurological:  Negative for seizures and syncope.   All other systems reviewed and are negative.   A complete review of systems is negative other than that noted above in the HPI.      Current Outpatient Medications   Medication Sig Dispense Refill    ALPRAZolam (XANAX) 0.25 mg tablet Take 1 tablet (0.25 mg total) by mouth 2 (two) times a day as needed for anxiety 60 tablet 0    buPROPion (WELLBUTRIN XL) 150 mg 24 hr tablet TAKE 1 TABLET BY MOUTH EVERY  DAY IN THE MORNING 90 tablet 1    lubiprostone (AMITIZA) 8 mcg capsule Take 1 capsule (8 mcg total) by mouth 2 (two) times a day with meals 60 capsule 3    Vitamin D 12.5 MCG/0.25ML LIQD Take 5,000 Int'l Units/day by mouth in the morning      linaCLOtide (Linzess) 290 MCG CAPS Take 1 capsule by mouth daily before breakfast (Patient not taking: Reported on 3/4/2025) 30 capsule 3    multivitamin (THERAGRAN) TABS Take 1 tablet by mouth daily Gummy (Patient not taking: Reported on 3/4/2025)      pancrelipase, Lip-Prot-Amyl, (CREON) 6,000 units delayed release capsule Take 6,000 units of lipase by mouth 3 (three) times a day with meals (Patient not taking: Reported on 3/4/2025) 270 capsule 2    Plecanatide (Trulance) 3 MG TABS Take 3 mg by mouth in the morning (Patient not taking: Reported on 4/15/2025) 30 tablet 3    polyethylene glycol (GOLYTELY) 4000 mL solution Take as directed by GI office (Patient not taking: Reported on 4/15/2025) 4000 mL 0    polyethylene glycol (MIRALAX) 17 g packet Take with 64 oz Gatorade for colonoscopy prep as instructed. (Patient not taking: Reported on 4/15/2025) 238 g 0     No current facility-administered medications for this visit.     Objective   There were no vitals taken for this visit.    Physical Exam  Vitals and nursing note reviewed.   Constitutional:       General: He is not in acute distress.     Appearance: He is well-developed.   HENT:      Head: Normocephalic and atraumatic.   Eyes:      Conjunctiva/sclera: Conjunctivae normal.   Cardiovascular:      Rate and Rhythm: Normal rate and regular rhythm.      Heart sounds: No murmur heard.  Pulmonary:      Effort: Pulmonary effort is normal. No respiratory distress.      Breath sounds: Normal breath sounds.   Abdominal:      General: Bowel sounds are decreased.      Palpations: Abdomen is soft.      Tenderness: There is no abdominal tenderness.   Musculoskeletal:         General: No swelling.      Cervical back: Neck supple.    Skin:     General: Skin is warm and dry.      Capillary Refill: Capillary refill takes less than 2 seconds.   Neurological:      Mental Status: He is alert and oriented to person, place, and time.   Psychiatric:         Mood and Affect: Mood normal.            Lab Results: I personally reviewed relevant lab results.       Results for orders placed during the hospital encounter of 09/24/24    Colonoscopy    Impression  Mild nodular mucosa in the terminal ileum; performed cold forceps biopsy  One 15 mm polyp in the hepatic flexure; performed cold snare with piecemeal removal  Small hemorrhoids  Otherwise normal colonic mucosa  Performed pancolonic forceps biopsies to rule out colitis        RECOMMENDATION:  Await pathology results  Repeat colonoscopy in 3 years, due: 9/24/2027  Personal history of colon polyps              Diana M Jaiyeola, MD

## 2025-05-12 DIAGNOSIS — E63.9 NUTRITIONAL DEFICIENCY: Primary | ICD-10-CM

## 2025-05-20 DIAGNOSIS — F41.1 GAD (GENERALIZED ANXIETY DISORDER): ICD-10-CM

## 2025-05-20 RX ORDER — BUPROPION HYDROCHLORIDE 150 MG/1
150 TABLET ORAL EVERY MORNING
Qty: 90 TABLET | Refills: 1 | Status: SHIPPED | OUTPATIENT
Start: 2025-05-20

## 2025-05-22 ENCOUNTER — TELEPHONE (OUTPATIENT)
Age: 20
End: 2025-05-22

## 2025-05-22 DIAGNOSIS — K59.04 CHRONIC IDIOPATHIC CONSTIPATION: Primary | ICD-10-CM

## 2025-05-22 RX ORDER — PLECANATIDE 3 MG/1
3 TABLET ORAL DAILY
Qty: 30 TABLET | Refills: 3 | Status: SHIPPED | OUTPATIENT
Start: 2025-05-22

## 2025-05-22 RX ORDER — LINACLOTIDE 145 UG/1
145 CAPSULE, GELATIN COATED ORAL DAILY
Qty: 30 CAPSULE | Refills: 3 | Status: SHIPPED | OUTPATIENT
Start: 2025-05-22

## 2025-05-22 NOTE — TELEPHONE ENCOUNTER
I agree that I do not want him to redo the SIBO breath test at this time.  I did not realize that he had it done and was treated several months ago.  We can try Linzess if Amitiza and MiraLAX did not help.  I will send prescription to pharmacy.

## 2025-05-22 NOTE — TELEPHONE ENCOUNTER
Spoke with pt in regards to [employer] message. Pt at this time does not want to redo SIBO-took Xifaxan and Neomycin which was not effective.   Currently taking Amitiza 24 mcg, miralax BID, prunes with some relief. Would like alternative medication to Amitiza if provider is agreeable.     Ok to send [employer] message

## 2025-05-23 ENCOUNTER — TELEPHONE (OUTPATIENT)
Age: 20
End: 2025-05-23

## 2025-05-23 ENCOUNTER — APPOINTMENT (OUTPATIENT)
Age: 20
End: 2025-05-23
Attending: NURSE PRACTITIONER
Payer: COMMERCIAL

## 2025-05-23 DIAGNOSIS — K86.89 PANCREATIC INSUFFICIENCY: ICD-10-CM

## 2025-05-23 PROCEDURE — 82653 EL-1 FECAL QUANTITATIVE: CPT

## 2025-05-23 NOTE — TELEPHONE ENCOUNTER
PA for trulance 3 mg    SUBMITTED to [employer]    via      [x]Other website Prompt PA EOC ID [id number]       [x]PA sent as URGENT    All office notes, labs and other pertaining documents and studies sent. Clinical questions answered. Awaiting determination from insurance company.     Turnaround time for your insurance to make a decision on your Prior Authorization can take 7-21 business days.

## 2025-05-23 NOTE — TELEPHONE ENCOUNTER
PA for linzess 145 mcg    SUBMITTED to [employer]    via    [x]Other website Prompt PA EOC ID [id number]       [x]PA sent as URGENT    All office notes, labs and other pertaining documents and studies sent. Clinical questions answered. Awaiting determination from insurance company.     Turnaround time for your insurance to make a decision on your Prior Authorization can take 7-21 business days.

## 2025-05-27 ENCOUNTER — RESULTS FOLLOW-UP (OUTPATIENT)
Dept: FAMILY MEDICINE CLINIC | Facility: CLINIC | Age: 20
End: 2025-05-27

## 2025-05-27 DIAGNOSIS — Z13.220 SCREENING FOR LIPID DISORDERS: ICD-10-CM

## 2025-05-27 DIAGNOSIS — Z13.1 SCREENING FOR DIABETES MELLITUS: Primary | ICD-10-CM

## 2025-05-27 LAB — ELASTASE PANC STL-MCNT: >800 UG/G

## 2025-05-28 ENCOUNTER — APPOINTMENT (OUTPATIENT)
Dept: LAB | Facility: CLINIC | Age: 20
End: 2025-05-28
Payer: COMMERCIAL

## 2025-05-28 DIAGNOSIS — Z13.1 SCREENING FOR DIABETES MELLITUS: ICD-10-CM

## 2025-05-28 LAB
CHOLEST SERPL-MCNC: 135 MG/DL (ref ?–200)
EST. AVERAGE GLUCOSE BLD GHB EST-MCNC: 108 MG/DL
HBA1C MFR BLD: 5.4 %
HDLC SERPL-MCNC: 52 MG/DL
LDLC SERPL CALC-MCNC: 74 MG/DL (ref 0–100)
NONHDLC SERPL-MCNC: 83 MG/DL
TRIGL SERPL-MCNC: 44 MG/DL (ref ?–150)

## 2025-05-28 PROCEDURE — 80061 LIPID PANEL: CPT | Performed by: FAMILY MEDICINE

## 2025-05-28 PROCEDURE — 83036 HEMOGLOBIN GLYCOSYLATED A1C: CPT

## 2025-05-28 PROCEDURE — 36415 COLL VENOUS BLD VENIPUNCTURE: CPT | Performed by: FAMILY MEDICINE

## 2025-05-29 ENCOUNTER — RESULTS FOLLOW-UP (OUTPATIENT)
Dept: FAMILY MEDICINE CLINIC | Facility: CLINIC | Age: 20
End: 2025-05-29

## 2025-06-26 ENCOUNTER — APPOINTMENT (OUTPATIENT)
Age: 20
End: 2025-06-26
Payer: COMMERCIAL

## 2025-06-26 DIAGNOSIS — E63.9 NUTRITIONAL DEFICIENCY: ICD-10-CM

## 2025-06-26 DIAGNOSIS — R14.0 BLOATING: ICD-10-CM

## 2025-06-26 DIAGNOSIS — R53.83 OTHER FATIGUE: ICD-10-CM

## 2025-06-26 LAB
25(OH)D3 SERPL-MCNC: 61.4 NG/ML (ref 30–100)
FERRITIN SERPL-MCNC: 57 NG/ML (ref 30–336)
IRON SATN MFR SERPL: 19 % (ref 15–50)
IRON SERPL-MCNC: 67 UG/DL (ref 50–212)
MAGNESIUM SERPL-MCNC: 2.1 MG/DL (ref 1.9–2.7)
TIBC SERPL-MCNC: 357 UG/DL (ref 250–450)
TRANSFERRIN SERPL-MCNC: 255 MG/DL (ref 203–362)
UIBC SERPL-MCNC: 290 UG/DL (ref 155–355)
VIT B12 SERPL-MCNC: 1154 PG/ML (ref 180–914)

## 2025-06-26 PROCEDURE — 83550 IRON BINDING TEST: CPT

## 2025-06-26 PROCEDURE — 84630 ASSAY OF ZINC: CPT

## 2025-06-26 PROCEDURE — 82672 ASSAY OF ESTROGEN: CPT

## 2025-06-26 PROCEDURE — 82607 VITAMIN B-12: CPT

## 2025-06-26 PROCEDURE — 82306 VITAMIN D 25 HYDROXY: CPT

## 2025-06-26 PROCEDURE — 84402 ASSAY OF FREE TESTOSTERONE: CPT

## 2025-06-26 PROCEDURE — 84403 ASSAY OF TOTAL TESTOSTERONE: CPT

## 2025-06-26 PROCEDURE — 83735 ASSAY OF MAGNESIUM: CPT

## 2025-06-26 PROCEDURE — 83540 ASSAY OF IRON: CPT

## 2025-06-26 PROCEDURE — 82728 ASSAY OF FERRITIN: CPT

## 2025-06-26 PROCEDURE — 36415 COLL VENOUS BLD VENIPUNCTURE: CPT

## 2025-06-27 LAB
TESTOST FREE SERPL-MCNC: 16.5 PG/ML (ref 9.3–26.5)
TESTOST SERPL-MCNC: 463 NG/DL (ref 264–916)

## 2025-06-28 LAB — ESTROGEN SERPL-MCNC: 183 PG/ML (ref 56–213)

## 2025-06-29 LAB — ZINC SERPL-MCNC: 89 UG/DL (ref 44–115)

## 2025-07-01 ENCOUNTER — OFFICE VISIT (OUTPATIENT)
Dept: FAMILY MEDICINE CLINIC | Facility: CLINIC | Age: 20
End: 2025-07-01
Payer: COMMERCIAL

## 2025-07-01 VITALS
HEIGHT: 67 IN | DIASTOLIC BLOOD PRESSURE: 62 MMHG | WEIGHT: 157 LBS | OXYGEN SATURATION: 98 % | HEART RATE: 75 BPM | BODY MASS INDEX: 24.64 KG/M2 | SYSTOLIC BLOOD PRESSURE: 118 MMHG

## 2025-07-01 DIAGNOSIS — F41.1 GAD (GENERALIZED ANXIETY DISORDER): ICD-10-CM

## 2025-07-01 DIAGNOSIS — K58.2 IRRITABLE BOWEL SYNDROME WITH BOTH CONSTIPATION AND DIARRHEA: ICD-10-CM

## 2025-07-01 DIAGNOSIS — K59.09 OTHER CONSTIPATION: Primary | ICD-10-CM

## 2025-07-01 DIAGNOSIS — E61.1 IRON DEFICIENCY: ICD-10-CM

## 2025-07-01 DIAGNOSIS — K86.89 PANCREATIC INSUFFICIENCY: ICD-10-CM

## 2025-07-01 PROBLEM — M79.672 PAIN IN LEFT FOOT: Status: RESOLVED | Noted: 2023-08-07 | Resolved: 2025-07-01

## 2025-07-01 PROCEDURE — 99214 OFFICE O/P EST MOD 30 MIN: CPT | Performed by: NURSE PRACTITIONER

## 2025-07-01 NOTE — ASSESSMENT & PLAN NOTE
Patient continues to follow with GI regarding this condition.  I did advise him to try to increase the amount of fiber he is taking in his diet to see if this improves the constipation.  X-ray of the abdomen was also ordered to be completed to assess the constipation.  Orders:  •  XR abdomen 1 view kub; Future

## 2025-07-01 NOTE — PATIENT INSTRUCTIONS
"Patient Education     High-fiber diet   The Basics   Written by the doctors and editors at [facility]   What is fiber? -- Fiber is a substance found in some fruits, vegetables, and grains. Most fiber passes through your body without being digested. But it can affect how you digest other foods, and it can also improve your bowel movements.  There are 2 kinds of fiber. One kind is called \"soluble fiber\" and is found in fruits, oats, barley, beans, and peas. The other kind is called \"insoluble fiber,\" and is found in wheat, rye, and other grains.  Both kinds of fiber that you eat are called \"dietary fiber.\"  Why is fiber important to my health? -- Fiber can help make your bowel movements softer and more regular. Adding fiber to your diet can help with problems including constipation, hemorrhoids, and diarrhea. Plus, it can help prevent \"accidents\" if you have trouble controlling your bowel movements.  Getting enough fiber can also help lower your risk of heart disease, stroke, and type 2 diabetes. That's because fiber can help lower cholesterol and help control blood sugar.  How much fiber do I need? -- The recommended amount of fiber is 20 to 35 grams a day. The nutrition label on packaged foods can show you how much fiber you are getting in each serving (figure 1).  How can I make sure I'm getting enough fiber? -- To make sure that you're getting enough fiber, eat plenty of the fruits, vegetables, and grains that contain fiber (table 1 and figure 2). Many breakfast cereals also have a lot of fiber.  If you can't get enough fiber from food, you can add wheat bran to the foods you do eat. Or you can take fiber supplements. These come in the form of powders, wafers, or pills. They include psyllium seed (sample brand names: Metamucil, Konsyl), methylcellulose (sample brand name: Citrucel), polycarbophil (sample brand name: FiberCon), and wheat dextrin (sample brand name: Benefiber). If you take a fiber supplement, be sure " "to read the label so you know how much to take. If you're not sure, ask your doctor or nurse.  What are the side effects of fiber? -- When you start eating more fiber, your belly might feel bloated, or you might have gas or cramps. You can avoid these side effects by adding fiber to your diet slowly.  Some people feel worse when they eat more fiber or take fiber supplements. If you feel worse after adding more fiber to your diet, you can try decreasing the amount of fiber to see if that helps.  All topics are updated as new evidence becomes available and our peer review process is complete.  This topic retrieved from [employer] on: Feb 28, 2024.  Topic 26190 Version 10.0  Release: 32.2.4 - C32.58  © 2024 UpToDate, Inc. and/or its affiliates. All rights reserved.  figure 1: Nutrition label - Fiber     This is an example of a nutrition label. To figure out how much fiber is in a food, look for the line that says \"Dietary Fiber.\" It's also important to look at the serving size. This food has 7 grams of fiber in each serving, and each serving is 1 cup.  Graphic 53883 Version 8.0  table 1: Amount of fiber in different foods  Food  Serving  Grams of fiber    Fruits    Apple (with skin) 1 medium apple 4.4   Banana 1 medium banana 3.1   Oranges 1 orange 3.1   Prunes 1 cup, pitted 12.4   Juices    Apple, unsweetened, with added ascorbic acid 1 cup 0.5   Grapefruit, white, canned, sweetened 1 cup 0.2   Grape, unsweetened, with added ascorbic acid 1 cup 0.5   Orange 1 cup 0.7   Vegetables    Cooked   Green beans 1 cup 4.0   Carrots 1/2 cup sliced 2.3   Peas 1 cup 8.8   Potato (baked, with skin) 1 medium potato 3.8   Raw   Cucumber (with peel) 1 cucumber 1.5   Lettuce 1 cup shredded 0.5   Tomato 1 medium tomato 1.5   Spinach 1 cup 0.7   Legumes   Baked beans, canned, no salt added 1 cup 13.9   Kidney beans, canned 1 cup 13.6   Lima beans, canned 1 cup 11.6   Lentils, boiled 1 cup 15.6   Breads, pastas, flours    Bran muffins 1 " medium muffin 5.2   Oatmeal, cooked 1 cup 4.0   White bread 1 slice 0.6   Whole-wheat bread 1 slice 1.9   Pasta and rice, cooked   Macaroni 1 cup 2.5   Rice, brown 1 cup 3.5   Rice, white 1 cup 0.6   Spaghetti (regular) 1 cup 2.5   Nuts    Almonds 1/2 cup 8.7   Peanuts 1/2 cup 7.9   To learn how much fiber and other nutrients are in different foods, visit the [place] Department of Agriculture ([employer]) Food[employer] website.  Graphic 96179 Version 6.0  figure 2: Foods with fiber     Foods with a lot of fiber include prunes, apples, oranges, bananas, peas, green beans, kidney beans, cooked oatmeal, almonds, peanuts, and whole-wheat bread.  Graphic 09631 Version 1.0  Consumer Information Use and Disclaimer   Disclaimer: This generalized information is a limited summary of diagnosis, treatment, and/or medication information. It is not meant to be comprehensive and should be used as a tool to help the user understand and/or assess potential diagnostic and treatment options. It does NOT include all information about conditions, treatments, medications, side effects, or risks that may apply to a specific patient. It is not intended to be medical advice or a substitute for the medical advice, diagnosis, or treatment of a health care provider based on the health care provider's examination and assessment of a patient's specific and unique circumstances. Patients must speak with a health care provider for complete information about their health, medical questions, and treatment options, including any risks or benefits regarding use of medications. This information does not endorse any treatments or medications as safe, effective, or approved for treating a specific patient. UpToDate, Inc. and its affiliates disclaim any warranty or liability relating to this information or the use thereof.The use of this information is governed by the Terms of Use, available at  https://www.[employer] vascular solutionuwer.com/en/know/clinical-effectiveness-terms. 2024© [employer] Inc. and its affiliates and/or licensors. All rights reserved.  Copyright   © 2024 [employer] and/or its affiliates. All rights reserved.

## 2025-07-01 NOTE — ASSESSMENT & PLAN NOTE
Patient continues to follow with GI regarding this condition.  Orders:  •  XR abdomen 1 view kub; Future

## 2025-07-01 NOTE — PROGRESS NOTES
Name: Mukund Sam      : 2005      MRN: [id number]  Encounter Provider: ARA Benjamin  Encounter Date: 2025   Encounter department: [facility]  :  Assessment & Plan  Other constipation  Patient continues to follow with GI regarding this condition.  I did advise him to try to increase the amount of fiber he is taking in his diet to see if this improves the constipation.  X-ray of the abdomen was also ordered to be completed to assess the constipation.  Orders:  •  XR abdomen 1 view kub; Future    Irritable bowel syndrome with both constipation and diarrhea  Patient continues to follow with GI regarding this condition.  Orders:  •  XR abdomen 1 view kub; Future    Pancreatic insufficiency  Patient continues to follow with GI regarding this condition.       VIV (generalized anxiety disorder)  Well-controlled on current regimen.       Iron deficiency  Patient's iron levels are currently normal.              History of Present Illness   IBS/pancreatic insufficiency/constipation: Patient continues to follow with GI regarding these conditions.  Patient does report that the plecanatide has improved his symptoms however, he does feel that he is still becoming frequently constipated.  He is regularly drinking water and exercising but he has not been focusing on getting fiber in his diet as he felt it may have been bloating him in the past.  The patient is requesting that an x-ray of his abdomen be completed to see if his constipation has improved.    VIV: Well-controlled on current regimen.  Patient reports rarely needing to use Xanax.    Iron deficiency: Patient's most recent iron panel was normal.      Review of Systems   Constitutional:  Negative for chills and fever.   HENT:  Negative for ear pain and sore throat.    Eyes:  Negative for pain and visual disturbance.   Respiratory:  Negative for cough, chest tightness, shortness of breath and wheezing.   "  Cardiovascular:  Negative for chest pain, palpitations and leg swelling.   Gastrointestinal:  Positive for abdominal pain (lower), constipation and diarrhea. Negative for nausea and vomiting.        Bloating   Endocrine: Negative for cold intolerance and heat intolerance.   Genitourinary:  Negative for decreased urine volume, dysuria and hematuria.   Musculoskeletal:  Negative for arthralgias, back pain and myalgias.   Skin:  Negative for color change and rash.   Allergic/Immunologic: Negative for environmental allergies.   Neurological:  Negative for dizziness, seizures, syncope, weakness, light-headedness, numbness and headaches.   Hematological:  Negative for adenopathy.   Psychiatric/Behavioral:  Negative for confusion. The patient is not nervous/anxious.    All other systems reviewed and are negative.      Objective   /62 (BP Location: Right arm, Patient Position: Sitting, Cuff Size: Standard)   Pulse 75   Ht 5' 7\" (1.702 m)   Wt 71.2 kg (157 lb)   SpO2 98%   BMI 24.59 kg/m²      Physical Exam  Vitals and nursing note reviewed.   Constitutional:       General: He is not in acute distress.     Appearance: Normal appearance. He is well-developed. He is not ill-appearing.   HENT:      Head: Normocephalic.     Eyes:      Conjunctiva/sclera: Conjunctivae normal.       Cardiovascular:      Rate and Rhythm: Normal rate and regular rhythm.      Pulses: Normal pulses.           Carotid pulses are 2+ on the right side and 2+ on the left side.       Radial pulses are 2+ on the right side and 2+ on the left side.        Posterior tibial pulses are 2+ on the right side and 2+ on the left side.      Heart sounds: Normal heart sounds. No murmur heard.  Pulmonary:      Effort: Pulmonary effort is normal. No respiratory distress.      Breath sounds: Normal breath sounds. No decreased breath sounds, wheezing, rhonchi or rales.   Abdominal:      General: Abdomen is flat. There is no distension.      Palpations: " Abdomen is soft.      Tenderness: There is abdominal tenderness in the left lower quadrant. There is no guarding.     Musculoskeletal:         General: No swelling. Normal range of motion.      Cervical back: Normal range of motion and neck supple.      Right lower leg: No edema.      Left lower leg: No edema.     Skin:     General: Skin is warm and dry.      Capillary Refill: Capillary refill takes less than 2 seconds.     Neurological:      General: No focal deficit present.      Mental Status: He is alert and oriented to person, place, and time.     Psychiatric:         Mood and Affect: Mood normal.         Behavior: Behavior normal.         Thought Content: Thought content normal.         Judgment: Judgment normal.

## 2025-07-09 ENCOUNTER — APPOINTMENT (OUTPATIENT)
Age: 20
End: 2025-07-09
Payer: COMMERCIAL

## 2025-07-09 ENCOUNTER — PATIENT MESSAGE (OUTPATIENT)
Dept: FAMILY MEDICINE CLINIC | Facility: CLINIC | Age: 20
End: 2025-07-09

## 2025-07-09 DIAGNOSIS — K59.09 OTHER CONSTIPATION: ICD-10-CM

## 2025-07-09 DIAGNOSIS — K58.2 IRRITABLE BOWEL SYNDROME WITH BOTH CONSTIPATION AND DIARRHEA: ICD-10-CM

## 2025-07-09 PROCEDURE — 74018 RADEX ABDOMEN 1 VIEW: CPT

## 2025-07-15 ENCOUNTER — TELEPHONE (OUTPATIENT)
Age: 20
End: 2025-07-15

## 2025-07-15 DIAGNOSIS — K59.04 CHRONIC IDIOPATHIC CONSTIPATION: Primary | ICD-10-CM

## 2025-07-15 RX ORDER — TENAPANOR HYDROCHLORIDE 53.2 MG/1
50 TABLET ORAL
Qty: 60 TABLET | Refills: 2 | Status: SHIPPED | OUTPATIENT
Start: 2025-07-15

## 2025-07-17 ENCOUNTER — TELEPHONE (OUTPATIENT)
Age: 20
End: 2025-07-17

## 2025-07-17 DIAGNOSIS — K59.04 CHRONIC IDIOPATHIC CONSTIPATION: Primary | ICD-10-CM

## 2025-07-17 RX ORDER — LUBIPROSTONE 24 UG/1
24 CAPSULE ORAL 2 TIMES DAILY WITH MEALS
Qty: 60 CAPSULE | Refills: 3 | Status: SHIPPED | OUTPATIENT
Start: 2025-07-17

## 2025-07-17 RX ORDER — LACTULOSE 10 G/15ML
20 SOLUTION ORAL 2 TIMES DAILY
Qty: 1800 ML | Refills: 3 | Status: SHIPPED | OUTPATIENT
Start: 2025-07-17 | End: 2025-08-16

## 2025-07-17 NOTE — TELEPHONE ENCOUNTER
Pt calling back in, he has never tried amitiza before only linzess and trulance. Pt willing to try amitiza,

## 2025-07-17 NOTE — TELEPHONE ENCOUNTER
Called pt and left a call back message for PT  to review medication denial and questions from Yvette

## 2025-08-13 ENCOUNTER — OFFICE VISIT (OUTPATIENT)
Dept: GASTROENTEROLOGY | Facility: MEDICAL CENTER | Age: 20
End: 2025-08-13
Payer: COMMERCIAL

## (undated) DEVICE — BETHLEHEM UNIVERSAL  MIONR EXT: Brand: CARDINAL HEALTH

## (undated) DEVICE — DRILL BIT, AO, SCALED: Brand: VARIAX

## (undated) DEVICE — JOINT PREP INSTRUMENT KIT: Brand: ORTHOLOC™ 2

## (undated) DEVICE — STANDARD SURGICAL GOWN, L: Brand: CONVERTORS

## (undated) DEVICE — CUFF TOURNIQUET 30 X 4 IN QUICK CONNECT DISP 1BLA

## (undated) DEVICE — ACE WRAP 6 IN UNSTERILE

## (undated) DEVICE — GAUZE SPONGES,16 PLY: Brand: CURITY

## (undated) DEVICE — PENCIL ELECTROSURG E-Z CLEAN -0035H

## (undated) DEVICE — STEINMANN PIN, SMOOTH
Type: IMPLANTABLE DEVICE | Site: FOOT | Status: NON-FUNCTIONAL
Brand: VARIAX
Removed: 2023-07-31

## (undated) DEVICE — CURITY NON-ADHERENT STRIPS: Brand: CURITY

## (undated) DEVICE — INTENDED FOR TISSUE SEPARATION, AND OTHER PROCEDURES THAT REQUIRE A SHARP SURGICAL BLADE TO PUNCTURE OR CUT.: Brand: BARD-PARKER ® SAFETYLOCK CARBON RIB-BACK BLADES

## (undated) DEVICE — DRAPE C-ARM X-RAY

## (undated) DEVICE — CANNULATED COUNTERSINK: Brand: FIXOS

## (undated) DEVICE — DISPOSABLE OR TOWEL: Brand: CARDINAL HEALTH

## (undated) DEVICE — WEBRIL 6 IN UNSTERILE

## (undated) DEVICE — DRILL BIT

## (undated) DEVICE — SCD SEQUENTIAL COMPRESSION COMFORT SLEEVE MEDIUM KNEE LENGTH: Brand: KENDALL SCD

## (undated) DEVICE — SINGLE PORT MANIFOLD: Brand: NEPTUNE 2

## (undated) DEVICE — SPLINT 4 IN X 15 FT DYNACAST S

## (undated) DEVICE — STERILE POLYISOPRENE POWDER-FREE SURGICAL GLOVES: Brand: PROTEXIS

## (undated) DEVICE — UNTHREADED GUIDE WIRE: Brand: FIXOS

## (undated) DEVICE — ACE WRAP 4 IN UNSTERILE

## (undated) DEVICE — CAST CART BUCKET

## (undated) DEVICE — CHLORAPREP HI-LITE 26ML ORANGE

## (undated) DEVICE — CAST PADDING 4 IN SYNTHETIC NON-STRL

## (undated) DEVICE — KERLIX BANDAGE ROLL: Brand: KERLIX